# Patient Record
Sex: MALE | Race: OTHER | Employment: FULL TIME | URBAN - METROPOLITAN AREA
[De-identification: names, ages, dates, MRNs, and addresses within clinical notes are randomized per-mention and may not be internally consistent; named-entity substitution may affect disease eponyms.]

---

## 2017-01-03 ENCOUNTER — OFFICE VISIT (OUTPATIENT)
Dept: HEMATOLOGY/ONCOLOGY | Facility: HOSPITAL | Age: 62
End: 2017-01-03
Attending: INTERNAL MEDICINE
Payer: COMMERCIAL

## 2017-01-03 VITALS
RESPIRATION RATE: 16 BRPM | HEART RATE: 64 BPM | WEIGHT: 184 LBS | DIASTOLIC BLOOD PRESSURE: 80 MMHG | BODY MASS INDEX: 25.76 KG/M2 | SYSTOLIC BLOOD PRESSURE: 136 MMHG | HEIGHT: 71 IN | TEMPERATURE: 98 F

## 2017-01-03 DIAGNOSIS — C83.30 DLBCL (DIFFUSE LARGE B CELL LYMPHOMA) (HCC): Primary | ICD-10-CM

## 2017-01-03 DIAGNOSIS — Z45.2 ENCOUNTER FOR ADJUSTMENT OR MANAGEMENT OF VASCULAR ACCESS DEVICE: ICD-10-CM

## 2017-01-03 DIAGNOSIS — Z09 CHEMOTHERAPY FOLLOW-UP EXAMINATION: ICD-10-CM

## 2017-01-03 LAB
ALBUMIN SERPL BCP-MCNC: 3.9 G/DL (ref 3.5–4.8)
ALBUMIN/GLOB SERPL: 1.6 {RATIO} (ref 1–2)
ALP SERPL-CCNC: 57 U/L (ref 32–100)
ALT SERPL-CCNC: 26 U/L (ref 17–63)
ANION GAP SERPL CALC-SCNC: 7 MMOL/L (ref 0–18)
AST SERPL-CCNC: 24 U/L (ref 15–41)
BASOPHILS # BLD: 0 K/UL (ref 0–0.2)
BASOPHILS NFR BLD: 0 %
BILIRUB SERPL-MCNC: 0.4 MG/DL (ref 0.3–1.2)
BUN SERPL-MCNC: 13 MG/DL (ref 8–20)
BUN/CREAT SERPL: 21.3 (ref 10–20)
CALCIUM SERPL-MCNC: 8.8 MG/DL (ref 8.5–10.5)
CHLORIDE SERPL-SCNC: 104 MMOL/L (ref 95–110)
CO2 SERPL-SCNC: 27 MMOL/L (ref 22–32)
CREAT SERPL-MCNC: 0.61 MG/DL (ref 0.5–1.5)
EOSINOPHIL # BLD: 0 K/UL (ref 0–0.7)
EOSINOPHIL NFR BLD: 1 %
ERYTHROCYTE [DISTWIDTH] IN BLOOD BY AUTOMATED COUNT: 19 % (ref 11–15)
GLOBULIN PLAS-MCNC: 2.5 G/DL (ref 2.5–3.7)
GLUCOSE SERPL-MCNC: 113 MG/DL (ref 70–99)
HCT VFR BLD AUTO: 35.1 % (ref 41–52)
HGB BLD-MCNC: 12.1 G/DL (ref 13.5–17.5)
LYMPHOCYTES # BLD: 1.2 K/UL (ref 1–4)
LYMPHOCYTES NFR BLD: 26 %
MCH RBC QN AUTO: 33.8 PG (ref 27–32)
MCHC RBC AUTO-ENTMCNC: 34.5 G/DL (ref 32–37)
MCV RBC AUTO: 97.9 FL (ref 80–100)
MONOCYTES # BLD: 0.7 K/UL (ref 0–1)
MONOCYTES NFR BLD: 15 %
NEUTROPHILS # BLD AUTO: 2.6 K/UL (ref 1.8–7.7)
NEUTROPHILS NFR BLD: 58 %
OSMOLALITY UR CALC.SUM OF ELEC: 287 MOSM/KG (ref 275–295)
PLATELET # BLD AUTO: 199 K/UL (ref 140–400)
PMV BLD AUTO: 9 FL (ref 7.4–10.3)
POTASSIUM SERPL-SCNC: 3.5 MMOL/L (ref 3.3–5.1)
PROT SERPL-MCNC: 6.4 G/DL (ref 5.9–8.4)
RBC # BLD AUTO: 3.59 M/UL (ref 4.5–5.9)
SODIUM SERPL-SCNC: 138 MMOL/L (ref 136–144)
WBC # BLD AUTO: 4.5 K/UL (ref 4–11)

## 2017-01-03 PROCEDURE — 85025 COMPLETE CBC W/AUTO DIFF WBC: CPT

## 2017-01-03 PROCEDURE — 99212 OFFICE O/P EST SF 10 MIN: CPT | Performed by: INTERNAL MEDICINE

## 2017-01-03 PROCEDURE — 80053 COMPREHEN METABOLIC PANEL: CPT

## 2017-01-03 PROCEDURE — 36591 DRAW BLOOD OFF VENOUS DEVICE: CPT

## 2017-01-03 PROCEDURE — 99215 OFFICE O/P EST HI 40 MIN: CPT | Performed by: INTERNAL MEDICINE

## 2017-01-03 RX ORDER — HEPARIN SODIUM (PORCINE) LOCK FLUSH IV SOLN 100 UNIT/ML 100 UNIT/ML
SOLUTION INTRAVENOUS
Status: COMPLETED
Start: 2017-01-03 | End: 2017-01-03

## 2017-01-03 RX ORDER — SODIUM CHLORIDE 0.9 % (FLUSH) 0.9 %
SYRINGE (ML) INJECTION
Status: DISCONTINUED
Start: 2017-01-03 | End: 2017-01-03

## 2017-01-03 RX ORDER — HEPARIN SODIUM (PORCINE) LOCK FLUSH IV SOLN 100 UNIT/ML 100 UNIT/ML
5 SOLUTION INTRAVENOUS ONCE
Status: COMPLETED | OUTPATIENT
Start: 2017-01-03 | End: 2017-01-03

## 2017-01-03 RX ORDER — 0.9 % SODIUM CHLORIDE 0.9 %
VIAL (ML) INJECTION
Status: DISCONTINUED
Start: 2017-01-03 | End: 2017-01-03

## 2017-01-03 RX ADMIN — HEPARIN SODIUM (PORCINE) LOCK FLUSH IV SOLN 100 UNIT/ML 500 UNITS: 100 SOLUTION INTRAVENOUS at 14:40:00

## 2017-01-03 NOTE — PROGRESS NOTES
MIK       Reina Azul is a 63year old male here for follow up of Dlbcl (diffuse large b cell lymphoma) (hcc)  (primary encounter diagnosis)  Chemotherapy follow-up examination       Pt here today for follow up s/p Cycle 3 RCHOP, tolerated well. easily. Psychiatric/Behavioral: Positive for sleep disturbance (better with sleeping pill ). The patient is not nervous/anxious.          Feeling less depressed            Current Outpatient Prescriptions:  Losartan Potassium-HCTZ 50-12.5 MG Oral Tab Take History   Problem Relation Age of Onset   • Heart Disease Mother    • Heart Disease Father    • Breast Cancer Sister 36         PHYSICAL EXAM:    /80 mmHg  Pulse 64  Temp(Src) 98.2 °F (36.8 °C) (Oral)  Resp 16  Ht 1.803 m (5' 11\")  Wt 83.462 kg (184 adenopathy present. Right: No inguinal and no supraclavicular adenopathy present. Left: No inguinal and no supraclavicular adenopathy present. Neurological: He is alert and oriented to person, place, and time. He has normal reflexes.    Skin: Calculated Osmolality 287 275-295 mOsm/kg   GFR, Non-African American >60 >=60   GFR, -American >60 >=60   -CBC W/ DIFFERENTIAL   Collection Time: 01/03/17  2:33 PM   Result Value Ref Range   WBC 4.5 4.0-11.0 K/UL   RBC 3.59 (L) 4.50-5.90 M/UL   H aneurysm or dissection. LUNGS/PLEURA:          Multiple unchanged lung nodules as follows:  A 4 mm and a 5 mm right apical nodule image 16 series 4. 3 mm anterior right upper lobe nodule image 32 series 4, 4 mm polygonal nodule minor fissure image 48 serie amount of stool in colon. No visible mass, obstruction, or bowel wall thickening.     ABDOMINAL WALL:      Small fat containing left inguinal hernia. .     URINARY BLADDER:     Nearly collapsed. ASCITES:                                 None.   PELVIC ORGANS

## 2017-01-03 NOTE — PROGRESS NOTES
Pt to infusion for pre-chemo labs. Port accessed using sterile technique, line flushing well with positive blood return noted. Labs were collected and sent. Port flushed per protocol and de-cannulated; site covered with 2x2 and steri strip.  Pt tolerated we

## 2017-01-04 ENCOUNTER — OFFICE VISIT (OUTPATIENT)
Dept: HEMATOLOGY/ONCOLOGY | Facility: HOSPITAL | Age: 62
End: 2017-01-04
Attending: INTERNAL MEDICINE
Payer: COMMERCIAL

## 2017-01-04 VITALS
TEMPERATURE: 98 F | DIASTOLIC BLOOD PRESSURE: 59 MMHG | RESPIRATION RATE: 16 BRPM | HEART RATE: 66 BPM | SYSTOLIC BLOOD PRESSURE: 108 MMHG

## 2017-01-04 DIAGNOSIS — C83.30 DLBCL (DIFFUSE LARGE B CELL LYMPHOMA) (HCC): Primary | ICD-10-CM

## 2017-01-04 DIAGNOSIS — Z45.2 ENCOUNTER FOR ADJUSTMENT OR MANAGEMENT OF VASCULAR ACCESS DEVICE: ICD-10-CM

## 2017-01-04 PROCEDURE — 96415 CHEMO IV INFUSION ADDL HR: CPT

## 2017-01-04 PROCEDURE — 96375 TX/PRO/DX INJ NEW DRUG ADDON: CPT

## 2017-01-04 PROCEDURE — 99211 OFF/OP EST MAY X REQ PHY/QHP: CPT

## 2017-01-04 PROCEDURE — 96411 CHEMO IV PUSH ADDL DRUG: CPT

## 2017-01-04 PROCEDURE — 96417 CHEMO IV INFUS EACH ADDL SEQ: CPT

## 2017-01-04 PROCEDURE — 96367 TX/PROPH/DG ADDL SEQ IV INF: CPT

## 2017-01-04 PROCEDURE — 96413 CHEMO IV INFUSION 1 HR: CPT

## 2017-01-04 RX ORDER — DIPHENHYDRAMINE HCL 25 MG
CAPSULE ORAL
Status: COMPLETED
Start: 2017-01-04 | End: 2017-01-04

## 2017-01-04 RX ORDER — DIPHENHYDRAMINE HCL 25 MG
25 CAPSULE ORAL ONCE
Status: COMPLETED | OUTPATIENT
Start: 2017-01-04 | End: 2017-01-04

## 2017-01-04 RX ORDER — HEPARIN SODIUM (PORCINE) LOCK FLUSH IV SOLN 100 UNIT/ML 100 UNIT/ML
SOLUTION INTRAVENOUS
Status: COMPLETED
Start: 2017-01-04 | End: 2017-01-04

## 2017-01-04 RX ORDER — ZOLPIDEM TARTRATE 10 MG/1
10 TABLET ORAL NIGHTLY PRN
Qty: 30 TABLET | Refills: 1 | Status: SHIPPED
Start: 2017-01-04 | End: 2017-07-17 | Stop reason: ALTCHOICE

## 2017-01-04 RX ORDER — 0.9 % SODIUM CHLORIDE 0.9 %
VIAL (ML) INJECTION
Status: DISCONTINUED
Start: 2017-01-04 | End: 2017-01-04

## 2017-01-04 RX ORDER — HEPARIN SODIUM (PORCINE) LOCK FLUSH IV SOLN 100 UNIT/ML 100 UNIT/ML
5 SOLUTION INTRAVENOUS ONCE
Status: COMPLETED | OUTPATIENT
Start: 2017-01-04 | End: 2017-01-04

## 2017-01-04 RX ORDER — SODIUM CHLORIDE 9 MG/ML
INJECTION, SOLUTION INTRAVENOUS
Status: DISCONTINUED
Start: 2017-01-04 | End: 2017-01-04

## 2017-01-04 RX ORDER — ACETAMINOPHEN 325 MG/1
650 TABLET ORAL ONCE
Status: COMPLETED | OUTPATIENT
Start: 2017-01-04 | End: 2017-01-04

## 2017-01-04 RX ORDER — ACETAMINOPHEN 325 MG/1
TABLET ORAL
Status: COMPLETED
Start: 2017-01-04 | End: 2017-01-04

## 2017-01-04 RX ADMIN — HEPARIN SODIUM (PORCINE) LOCK FLUSH IV SOLN 100 UNIT/ML 500 UNITS: 100 SOLUTION INTRAVENOUS at 12:40:00

## 2017-01-04 RX ADMIN — ACETAMINOPHEN 650 MG: 325 TABLET ORAL at 10:30:00

## 2017-01-04 RX ADMIN — DIPHENHYDRAMINE HCL 25 MG: 25 MG CAPSULE ORAL at 10:30:00

## 2017-01-04 NOTE — PROGRESS NOTES
Pt presents to infusion today for C4D1 RCHOP. Pt appears well and offers no complaints of nausea/vomiting/constipation/diarrhea. Pt denies any fevers or flu like symptoms. Pt eating and drinking well.    Port accessed using sterile technique, blood retur exceed 4 in one day.  Notify MD immediately if temp >100.4. Mucositis discussed.  Pt encouraged to use biotene after every meal and after brushing teeth to keep mouth as clean as possible.  Hemorrhagic cystitis also discussed and the pt was encouraged to k

## 2017-01-04 NOTE — PATIENT INSTRUCTIONS
Safe Handling of Chemotherapy  While you or your family member is receiving chemotherapy, whether in the clinic or at home, the following precautions must be taken to lessen any exposure to the medication. Handling Body Waste:   1.  Caregivers must wear treatment, so you may not know if you are at a time in your cycle when you could become pregnant or if you are actually pregnant.

## 2017-01-05 ENCOUNTER — APPOINTMENT (OUTPATIENT)
Dept: HEMATOLOGY/ONCOLOGY | Facility: HOSPITAL | Age: 62
End: 2017-01-05
Attending: INTERNAL MEDICINE
Payer: COMMERCIAL

## 2017-01-24 ENCOUNTER — NURSE ONLY (OUTPATIENT)
Dept: HEMATOLOGY/ONCOLOGY | Facility: HOSPITAL | Age: 62
End: 2017-01-24
Attending: INTERNAL MEDICINE
Payer: COMMERCIAL

## 2017-01-24 ENCOUNTER — TELEPHONE (OUTPATIENT)
Dept: HEMATOLOGY/ONCOLOGY | Facility: HOSPITAL | Age: 62
End: 2017-01-24

## 2017-01-24 VITALS
HEIGHT: 71 IN | RESPIRATION RATE: 16 BRPM | BODY MASS INDEX: 25.34 KG/M2 | HEART RATE: 60 BPM | SYSTOLIC BLOOD PRESSURE: 124 MMHG | WEIGHT: 181 LBS | TEMPERATURE: 98 F | DIASTOLIC BLOOD PRESSURE: 77 MMHG

## 2017-01-24 DIAGNOSIS — Z09 CHEMOTHERAPY FOLLOW-UP EXAMINATION: ICD-10-CM

## 2017-01-24 DIAGNOSIS — Z45.2 ENCOUNTER FOR ADJUSTMENT OR MANAGEMENT OF VASCULAR ACCESS DEVICE: ICD-10-CM

## 2017-01-24 DIAGNOSIS — C83.30 DLBCL (DIFFUSE LARGE B CELL LYMPHOMA) (HCC): Primary | ICD-10-CM

## 2017-01-24 LAB
ALBUMIN SERPL BCP-MCNC: 3.9 G/DL (ref 3.5–4.8)
ALBUMIN/GLOB SERPL: 1.6 {RATIO} (ref 1–2)
ALP SERPL-CCNC: 56 U/L (ref 32–100)
ALT SERPL-CCNC: 22 U/L (ref 17–63)
ANION GAP SERPL CALC-SCNC: 7 MMOL/L (ref 0–18)
AST SERPL-CCNC: 21 U/L (ref 15–41)
BASOPHILS # BLD: 0 K/UL (ref 0–0.2)
BASOPHILS NFR BLD: 0 %
BILIRUB SERPL-MCNC: 0.5 MG/DL (ref 0.3–1.2)
BUN SERPL-MCNC: 11 MG/DL (ref 8–20)
BUN/CREAT SERPL: 23.4 (ref 10–20)
CALCIUM SERPL-MCNC: 9.1 MG/DL (ref 8.5–10.5)
CHLORIDE SERPL-SCNC: 105 MMOL/L (ref 95–110)
CO2 SERPL-SCNC: 28 MMOL/L (ref 22–32)
CREAT SERPL-MCNC: 0.47 MG/DL (ref 0.5–1.5)
EOSINOPHIL # BLD: 0 K/UL (ref 0–0.7)
EOSINOPHIL NFR BLD: 1 %
ERYTHROCYTE [DISTWIDTH] IN BLOOD BY AUTOMATED COUNT: 18.2 % (ref 11–15)
GLOBULIN PLAS-MCNC: 2.5 G/DL (ref 2.5–3.7)
GLUCOSE SERPL-MCNC: 127 MG/DL (ref 70–99)
HCT VFR BLD AUTO: 34.2 % (ref 41–52)
HGB BLD-MCNC: 11.9 G/DL (ref 13.5–17.5)
LYMPHOCYTES # BLD: 1 K/UL (ref 1–4)
LYMPHOCYTES NFR BLD: 25 %
MCH RBC QN AUTO: 34.4 PG (ref 27–32)
MCHC RBC AUTO-ENTMCNC: 34.6 G/DL (ref 32–37)
MCV RBC AUTO: 99.2 FL (ref 80–100)
MONOCYTES # BLD: 0.5 K/UL (ref 0–1)
MONOCYTES NFR BLD: 13 %
NEUTROPHILS # BLD AUTO: 2.4 K/UL (ref 1.8–7.7)
NEUTROPHILS NFR BLD: 62 %
OSMOLALITY UR CALC.SUM OF ELEC: 291 MOSM/KG (ref 275–295)
PLATELET # BLD AUTO: 207 K/UL (ref 140–400)
PMV BLD AUTO: 8.9 FL (ref 7.4–10.3)
POTASSIUM SERPL-SCNC: 3.5 MMOL/L (ref 3.3–5.1)
PROT SERPL-MCNC: 6.4 G/DL (ref 5.9–8.4)
RBC # BLD AUTO: 3.45 M/UL (ref 4.5–5.9)
SODIUM SERPL-SCNC: 140 MMOL/L (ref 136–144)
WBC # BLD AUTO: 3.9 K/UL (ref 4–11)

## 2017-01-24 PROCEDURE — 36591 DRAW BLOOD OFF VENOUS DEVICE: CPT

## 2017-01-24 PROCEDURE — 99215 OFFICE O/P EST HI 40 MIN: CPT | Performed by: NURSE PRACTITIONER

## 2017-01-24 PROCEDURE — 80053 COMPREHEN METABOLIC PANEL: CPT

## 2017-01-24 PROCEDURE — 85025 COMPLETE CBC W/AUTO DIFF WBC: CPT

## 2017-01-24 PROCEDURE — 99212 OFFICE O/P EST SF 10 MIN: CPT | Performed by: NURSE PRACTITIONER

## 2017-01-24 RX ORDER — HEPARIN SODIUM (PORCINE) LOCK FLUSH IV SOLN 100 UNIT/ML 100 UNIT/ML
5 SOLUTION INTRAVENOUS ONCE
Status: COMPLETED | OUTPATIENT
Start: 2017-01-24 | End: 2017-01-24

## 2017-01-24 RX ORDER — PEGFILGRASTIM 6 MG/.6ML
INJECTION SUBCUTANEOUS
Qty: 0.6 ML | Refills: 1 | Status: SHIPPED | OUTPATIENT
Start: 2017-01-24 | End: 2017-07-17 | Stop reason: ALTCHOICE

## 2017-01-24 RX ORDER — HEPARIN SODIUM (PORCINE) LOCK FLUSH IV SOLN 100 UNIT/ML 100 UNIT/ML
SOLUTION INTRAVENOUS
Status: DISCONTINUED
Start: 2017-01-24 | End: 2017-01-24

## 2017-01-24 RX ORDER — 0.9 % SODIUM CHLORIDE 0.9 %
VIAL (ML) INJECTION
Status: DISCONTINUED
Start: 2017-01-24 | End: 2017-01-24

## 2017-01-24 RX ORDER — SODIUM CHLORIDE 0.9 % (FLUSH) 0.9 %
SYRINGE (ML) INJECTION
Status: DISCONTINUED
Start: 2017-01-24 | End: 2017-01-24

## 2017-01-24 RX ADMIN — HEPARIN SODIUM (PORCINE) LOCK FLUSH IV SOLN 100 UNIT/ML 500 UNITS: 100 SOLUTION INTRAVENOUS at 13:07:00

## 2017-01-24 NOTE — PROGRESS NOTES
Pt here for pre-chemo labs, appt with René Luna NP. Tomorrow = chemo infusion appt and pt is aware. He states that for the first 5 or so days post-chemo, he feels tired, blah. He states that he is able to eat/drink without any n/v, GI issues.   He denie

## 2017-01-24 NOTE — PROGRESS NOTES
MIK Augustin is a 63year old male here for follow up of Dlbcl (diffuse large b cell lymphoma) (hcc)  (primary encounter diagnosis)  Chemotherapy follow-up examination       Pt here today for follow up s/p Cycle 4 RCHOP, tolerated well.  Judie Vazquez not bruise/bleed easily. Psychiatric/Behavioral: Positive for sleep disturbance (better with sleeping pill ). The patient is not nervous/anxious.          Feeling less depressed            Current Outpatient Prescriptions:  NEULASTA 6 MG/0.6ML Subcutaneou Concern Yes    Comment: Coffee     Social History Narrative     Family History   Problem Relation Age of Onset   • Heart Disease Mother    • Heart Disease Father    • Breast Cancer Sister 36         PHYSICAL EXAM:    /77 mmHg  Pulse 60  Temp(Src) 98. adenopathy present. Left axillary: No pectoral and no lateral adenopathy present. Right: No inguinal and no supraclavicular adenopathy present. Left: No inguinal and no supraclavicular adenopathy present.    Neurological: He is alert and 10.0-20.0   Calculated Osmolality 291 275-295 mOsm/kg   GFR, Non-African American >60 >=60   GFR, -American >60 >=60   -CBC W/ DIFFERENTIAL   Collection Time: 01/24/17 12:56 PM   Result Value Ref Range   WBC 3.9 (L) 4.0-11.0 K/UL   RBC 3.45 (L) 4.50

## 2017-01-24 NOTE — TELEPHONE ENCOUNTER
[facility] pharmacy called to verify they received script for Neulasta from [facility]. Per Freida Camilo is ready to be picked up per pt.

## 2017-01-25 ENCOUNTER — OFFICE VISIT (OUTPATIENT)
Dept: HEMATOLOGY/ONCOLOGY | Facility: HOSPITAL | Age: 62
End: 2017-01-25
Attending: INTERNAL MEDICINE
Payer: COMMERCIAL

## 2017-01-25 VITALS
TEMPERATURE: 98 F | SYSTOLIC BLOOD PRESSURE: 112 MMHG | DIASTOLIC BLOOD PRESSURE: 67 MMHG | RESPIRATION RATE: 16 BRPM | HEART RATE: 73 BPM

## 2017-01-25 DIAGNOSIS — C83.30 DLBCL (DIFFUSE LARGE B CELL LYMPHOMA) (HCC): Primary | ICD-10-CM

## 2017-01-25 DIAGNOSIS — Z45.2 ENCOUNTER FOR ADJUSTMENT OR MANAGEMENT OF VASCULAR ACCESS DEVICE: ICD-10-CM

## 2017-01-25 PROCEDURE — 96413 CHEMO IV INFUSION 1 HR: CPT

## 2017-01-25 PROCEDURE — 96367 TX/PROPH/DG ADDL SEQ IV INF: CPT

## 2017-01-25 PROCEDURE — 96411 CHEMO IV PUSH ADDL DRUG: CPT

## 2017-01-25 PROCEDURE — 96417 CHEMO IV INFUS EACH ADDL SEQ: CPT

## 2017-01-25 PROCEDURE — 96375 TX/PRO/DX INJ NEW DRUG ADDON: CPT

## 2017-01-25 PROCEDURE — 96415 CHEMO IV INFUSION ADDL HR: CPT

## 2017-01-25 RX ORDER — SODIUM CHLORIDE 9 MG/ML
INJECTION, SOLUTION INTRAVENOUS
Status: DISCONTINUED
Start: 2017-01-25 | End: 2017-01-25

## 2017-01-25 RX ORDER — SODIUM CHLORIDE 0.9 % (FLUSH) 0.9 %
SYRINGE (ML) INJECTION
Status: DISCONTINUED
Start: 2017-01-25 | End: 2017-01-25

## 2017-01-25 RX ORDER — DIPHENHYDRAMINE HCL 25 MG
25 CAPSULE ORAL ONCE
Status: COMPLETED | OUTPATIENT
Start: 2017-01-25 | End: 2017-01-25

## 2017-01-25 RX ORDER — ACETAMINOPHEN 325 MG/1
650 TABLET ORAL ONCE
Status: COMPLETED | OUTPATIENT
Start: 2017-01-25 | End: 2017-01-25

## 2017-01-25 RX ORDER — HEPARIN SODIUM (PORCINE) LOCK FLUSH IV SOLN 100 UNIT/ML 100 UNIT/ML
SOLUTION INTRAVENOUS
Status: COMPLETED
Start: 2017-01-25 | End: 2017-01-25

## 2017-01-25 RX ORDER — DIPHENHYDRAMINE HCL 25 MG
CAPSULE ORAL
Status: COMPLETED
Start: 2017-01-25 | End: 2017-01-25

## 2017-01-25 RX ORDER — ACETAMINOPHEN 325 MG/1
TABLET ORAL
Status: COMPLETED
Start: 2017-01-25 | End: 2017-01-25

## 2017-01-25 RX ORDER — HEPARIN SODIUM (PORCINE) LOCK FLUSH IV SOLN 100 UNIT/ML 100 UNIT/ML
5 SOLUTION INTRAVENOUS ONCE
Status: COMPLETED | OUTPATIENT
Start: 2017-01-25 | End: 2017-01-25

## 2017-01-25 RX ORDER — 0.9 % SODIUM CHLORIDE 0.9 %
VIAL (ML) INJECTION
Status: DISCONTINUED
Start: 2017-01-25 | End: 2017-01-25

## 2017-01-25 RX ADMIN — HEPARIN SODIUM (PORCINE) LOCK FLUSH IV SOLN 100 UNIT/ML 500 UNITS: 100 SOLUTION INTRAVENOUS at 13:00:00

## 2017-01-25 RX ADMIN — ACETAMINOPHEN 650 MG: 325 TABLET ORAL at 10:06:00

## 2017-01-25 RX ADMIN — DIPHENHYDRAMINE HCL 25 MG: 25 MG CAPSULE ORAL at 10:06:00

## 2017-01-25 NOTE — PROGRESS NOTES
Pt presents to infusion today for C5D1 RCHOP. Pt appears well and offers no complaints of nausea/vomiting/constipation/diarrhea. Pt denies any fevers or flu like symptoms. Pt eating and drinking well, pt is down 3 lbs since last treatment.  Pt had diffic plan of care  verbalize how to care for self    Progress towards outcome:  making progress

## 2017-01-27 ENCOUNTER — TELEPHONE (OUTPATIENT)
Dept: HEMATOLOGY/ONCOLOGY | Facility: HOSPITAL | Age: 62
End: 2017-01-27

## 2017-02-02 ENCOUNTER — OFFICE VISIT (OUTPATIENT)
Dept: HEMATOLOGY/ONCOLOGY | Facility: HOSPITAL | Age: 62
End: 2017-02-02
Attending: NURSE PRACTITIONER
Payer: COMMERCIAL

## 2017-02-02 ENCOUNTER — TELEPHONE (OUTPATIENT)
Dept: HEMATOLOGY/ONCOLOGY | Facility: HOSPITAL | Age: 62
End: 2017-02-02

## 2017-02-02 VITALS
WEIGHT: 180 LBS | HEART RATE: 76 BPM | BODY MASS INDEX: 25.2 KG/M2 | HEIGHT: 71 IN | SYSTOLIC BLOOD PRESSURE: 139 MMHG | DIASTOLIC BLOOD PRESSURE: 84 MMHG | TEMPERATURE: 98 F | RESPIRATION RATE: 16 BRPM

## 2017-02-02 DIAGNOSIS — C83.30 DLBCL (DIFFUSE LARGE B CELL LYMPHOMA) (HCC): Primary | ICD-10-CM

## 2017-02-02 DIAGNOSIS — Z09 CHEMOTHERAPY FOLLOW-UP EXAMINATION: ICD-10-CM

## 2017-02-02 LAB
ALBUMIN SERPL BCP-MCNC: 4.3 G/DL (ref 3.5–4.8)
ALBUMIN/GLOB SERPL: 1.9 {RATIO} (ref 1–2)
ALP SERPL-CCNC: 77 U/L (ref 32–100)
ALT SERPL-CCNC: 22 U/L (ref 17–63)
ANION GAP SERPL CALC-SCNC: 9 MMOL/L (ref 0–18)
AST SERPL-CCNC: 19 U/L (ref 15–41)
BASOPHILS # BLD: 0.1 K/UL (ref 0–0.2)
BASOPHILS NFR BLD: 2 %
BILIRUB SERPL-MCNC: 0.6 MG/DL (ref 0.3–1.2)
BUN SERPL-MCNC: 11 MG/DL (ref 8–20)
BUN/CREAT SERPL: 18.3 (ref 10–20)
CALCIUM SERPL-MCNC: 9.3 MG/DL (ref 8.5–10.5)
CHLORIDE SERPL-SCNC: 101 MMOL/L (ref 95–110)
CO2 SERPL-SCNC: 28 MMOL/L (ref 22–32)
CREAT SERPL-MCNC: 0.6 MG/DL (ref 0.5–1.5)
EOSINOPHIL # BLD: 0 K/UL (ref 0–0.7)
EOSINOPHIL NFR BLD: 0 %
ERYTHROCYTE [DISTWIDTH] IN BLOOD BY AUTOMATED COUNT: 17.2 % (ref 11–15)
GLOBULIN PLAS-MCNC: 2.3 G/DL (ref 2.5–3.7)
GLUCOSE SERPL-MCNC: 105 MG/DL (ref 70–99)
HCT VFR BLD AUTO: 35.3 % (ref 41–52)
HGB BLD-MCNC: 12.1 G/DL (ref 13.5–17.5)
LYMPHOCYTES # BLD: 1 K/UL (ref 1–4)
LYMPHOCYTES NFR BLD: 31 %
MCH RBC QN AUTO: 34.1 PG (ref 27–32)
MCHC RBC AUTO-ENTMCNC: 34.2 G/DL (ref 32–37)
MCV RBC AUTO: 99.6 FL (ref 80–100)
METAMYELOCYTES # BLD MANUAL: 0.06 K/UL
METAMYELOCYTES # BLD MANUAL: 0.12 K/UL
METAMYELOCYTES NFR BLD: 4 %
MONOCYTES # BLD: 0.3 K/UL (ref 0–1)
MONOCYTES NFR BLD: 10 %
MYELOCYTES NFR BLD: 2 %
NEUTROPHILS # BLD AUTO: 1.6 K/UL (ref 1.8–7.7)
NEUTROPHILS NFR BLD: 39 %
NEUTS BAND NFR BLD: 12 %
OSMOLALITY UR CALC.SUM OF ELEC: 286 MOSM/KG (ref 275–295)
PLATELET # BLD AUTO: 114 K/UL (ref 140–400)
PMV BLD AUTO: 9.5 FL (ref 7.4–10.3)
POTASSIUM SERPL-SCNC: 3.7 MMOL/L (ref 3.3–5.1)
PROT SERPL-MCNC: 6.6 G/DL (ref 5.9–8.4)
RBC # BLD AUTO: 3.55 M/UL (ref 4.5–5.9)
SODIUM SERPL-SCNC: 138 MMOL/L (ref 136–144)
WBC # BLD AUTO: 3.1 K/UL (ref 4–11)

## 2017-02-02 PROCEDURE — 99213 OFFICE O/P EST LOW 20 MIN: CPT | Performed by: NURSE PRACTITIONER

## 2017-02-02 PROCEDURE — 99212 OFFICE O/P EST SF 10 MIN: CPT | Performed by: NURSE PRACTITIONER

## 2017-02-02 NOTE — PROGRESS NOTES
HPI       Quique Enriquez is a [address] old male here for follow up of Dlbcl (diffuse large b cell lymphoma) (hcc)  (primary encounter diagnosis)  Chemotherapy follow-up examination       Pt here today for acute visit s/p Cycle 5D9 RCHOP.  Came to front arthralgias, gait problem and neck pain. Skin: Negative. Negative for color change and rash. Alopecia    Allergic/Immunologic: Negative for environmental allergies.    Neurological: Positive for light-headedness and numbness (mild numbness to fing Education: N/A  Number of Children: 4     Occupational History  works for shipping       Social History Main Topics   Smoking status: Former Smoker  1.00 Packs/Day  For 35.00 Years     Smokeless tobacco: Never Used    Comment: quit 3 yrs ago    Alcohol Use submandibular, no preauricular, no posterior auricular and no occipital adenopathy present. He has no cervical adenopathy. Right cervical: No superficial cervical, no deep cervical and no posterior cervical adenopathy present.        Left cervica Referrals:  None   No orders of the defined types were placed in this encounter.

## 2017-02-02 NOTE — TELEPHONE ENCOUNTER
Toxicities: C5D1 RCHOP on 1/25/2017      Fatigue/Mucositis/Dysphagia/ Nausea/Dehydration    Fever:  (Denies c/s/f  Voiding clear/yellow urine.  No hematuria.)  Fatigue: Grade 1  Mucositis Oral: Grade 1 (Sore throat & pain with swallowing present since 1/28/

## 2017-02-14 ENCOUNTER — NURSE ONLY (OUTPATIENT)
Dept: HEMATOLOGY/ONCOLOGY | Facility: HOSPITAL | Age: 62
End: 2017-02-14
Attending: INTERNAL MEDICINE
Payer: COMMERCIAL

## 2017-02-14 VITALS
WEIGHT: 184 LBS | BODY MASS INDEX: 25.76 KG/M2 | RESPIRATION RATE: 16 BRPM | HEART RATE: 67 BPM | SYSTOLIC BLOOD PRESSURE: 131 MMHG | TEMPERATURE: 98 F | HEIGHT: 71 IN | DIASTOLIC BLOOD PRESSURE: 90 MMHG

## 2017-02-14 DIAGNOSIS — Z45.2 ENCOUNTER FOR ADJUSTMENT OR MANAGEMENT OF VASCULAR ACCESS DEVICE: ICD-10-CM

## 2017-02-14 DIAGNOSIS — C83.30 DLBCL (DIFFUSE LARGE B CELL LYMPHOMA) (HCC): Primary | ICD-10-CM

## 2017-02-14 DIAGNOSIS — Z09 CHEMOTHERAPY FOLLOW-UP EXAMINATION: ICD-10-CM

## 2017-02-14 LAB
ALBUMIN SERPL BCP-MCNC: 4.1 G/DL (ref 3.5–4.8)
ALBUMIN/GLOB SERPL: 1.6 {RATIO} (ref 1–2)
ALP SERPL-CCNC: 54 U/L (ref 32–100)
ALT SERPL-CCNC: 27 U/L (ref 17–63)
ANION GAP SERPL CALC-SCNC: 9 MMOL/L (ref 0–18)
AST SERPL-CCNC: 23 U/L (ref 15–41)
BASOPHILS # BLD: 0 K/UL (ref 0–0.2)
BASOPHILS NFR BLD: 1 %
BILIRUB SERPL-MCNC: 0.5 MG/DL (ref 0.3–1.2)
BUN SERPL-MCNC: 9 MG/DL (ref 8–20)
BUN/CREAT SERPL: 18.4 (ref 10–20)
CALCIUM SERPL-MCNC: 8.9 MG/DL (ref 8.5–10.5)
CHLORIDE SERPL-SCNC: 101 MMOL/L (ref 95–110)
CO2 SERPL-SCNC: 28 MMOL/L (ref 22–32)
CREAT SERPL-MCNC: 0.49 MG/DL (ref 0.5–1.5)
EOSINOPHIL # BLD: 0 K/UL (ref 0–0.7)
EOSINOPHIL NFR BLD: 0 %
ERYTHROCYTE [DISTWIDTH] IN BLOOD BY AUTOMATED COUNT: 16.1 % (ref 11–15)
GLOBULIN PLAS-MCNC: 2.5 G/DL (ref 2.5–3.7)
GLUCOSE SERPL-MCNC: 125 MG/DL (ref 70–99)
HCT VFR BLD AUTO: 34.7 % (ref 41–52)
HGB BLD-MCNC: 12 G/DL (ref 13.5–17.5)
LYMPHOCYTES # BLD: 1 K/UL (ref 1–4)
LYMPHOCYTES NFR BLD: 22 %
MCH RBC QN AUTO: 34.4 PG (ref 27–32)
MCHC RBC AUTO-ENTMCNC: 34.7 G/DL (ref 32–37)
MCV RBC AUTO: 99.1 FL (ref 80–100)
MONOCYTES # BLD: 0.6 K/UL (ref 0–1)
MONOCYTES NFR BLD: 14 %
NEUTROPHILS # BLD AUTO: 2.8 K/UL (ref 1.8–7.7)
NEUTROPHILS NFR BLD: 63 %
OSMOLALITY UR CALC.SUM OF ELEC: 286 MOSM/KG (ref 275–295)
PLATELET # BLD AUTO: 219 K/UL (ref 140–400)
PMV BLD AUTO: 8.6 FL (ref 7.4–10.3)
POTASSIUM SERPL-SCNC: 3.3 MMOL/L (ref 3.3–5.1)
PROT SERPL-MCNC: 6.6 G/DL (ref 5.9–8.4)
RBC # BLD AUTO: 3.51 M/UL (ref 4.5–5.9)
SODIUM SERPL-SCNC: 138 MMOL/L (ref 136–144)
WBC # BLD AUTO: 4.4 K/UL (ref 4–11)

## 2017-02-14 PROCEDURE — 85025 COMPLETE CBC W/AUTO DIFF WBC: CPT

## 2017-02-14 PROCEDURE — 99215 OFFICE O/P EST HI 40 MIN: CPT | Performed by: NURSE PRACTITIONER

## 2017-02-14 PROCEDURE — 99212 OFFICE O/P EST SF 10 MIN: CPT | Performed by: NURSE PRACTITIONER

## 2017-02-14 PROCEDURE — 80053 COMPREHEN METABOLIC PANEL: CPT

## 2017-02-14 PROCEDURE — 36591 DRAW BLOOD OFF VENOUS DEVICE: CPT

## 2017-02-14 RX ORDER — PREDNISONE 20 MG/1
TABLET ORAL
Qty: 25 TABLET | Refills: 0 | Status: ON HOLD | OUTPATIENT
Start: 2017-02-14 | End: 2017-06-07

## 2017-02-14 RX ORDER — 0.9 % SODIUM CHLORIDE 0.9 %
10 VIAL (ML) INJECTION ONCE
Status: COMPLETED | OUTPATIENT
Start: 2017-02-14 | End: 2017-02-14

## 2017-02-14 RX ORDER — 0.9 % SODIUM CHLORIDE 0.9 %
VIAL (ML) INJECTION
Status: DISCONTINUED
Start: 2017-02-14 | End: 2017-02-14

## 2017-02-14 RX ORDER — HEPARIN SODIUM (PORCINE) LOCK FLUSH IV SOLN 100 UNIT/ML 100 UNIT/ML
5 SOLUTION INTRAVENOUS ONCE
Status: COMPLETED | OUTPATIENT
Start: 2017-02-14 | End: 2017-02-14

## 2017-02-14 RX ORDER — HEPARIN SODIUM (PORCINE) LOCK FLUSH IV SOLN 100 UNIT/ML 100 UNIT/ML
SOLUTION INTRAVENOUS
Status: DISCONTINUED
Start: 2017-02-14 | End: 2017-02-14

## 2017-02-14 RX ADMIN — HEPARIN SODIUM (PORCINE) LOCK FLUSH IV SOLN 100 UNIT/ML 500 UNITS: 100 SOLUTION INTRAVENOUS at 15:12:00

## 2017-02-14 RX ADMIN — 0.9 % SODIUM CHLORIDE 10 ML: 0.9 % VIAL (ML) INJECTION at 15:12:00

## 2017-02-14 NOTE — PROGRESS NOTES
Patient here for Pre-Chemotherapy lab draw. Right chest port accessed per sterile technique, good blood return noted. Cbc,cmp obtained and sent to lab. Port flushed per protocol and calvo needle removed.   Gauze and paper tape to site  Patient discharged

## 2017-02-14 NOTE — PROGRESS NOTES
HPI       Lawyer Cordova is a 63year old male here for follow up of Dlbcl (diffuse large b cell lymphoma) (hcc)  (primary encounter diagnosis)  Chemotherapy follow-up examination       Pt here today for follow up s/p Cycle 5 RCHOP.  C/o took longer t Hematological: Negative for adenopathy. Does not bruise/bleed easily. Psychiatric/Behavioral: Positive for sleep disturbance (better with sleeping pill ). The patient is not nervous/anxious.          Feeling less depressed            Current Outpatient Not on file  Other Topics Concern    Caffeine Concern Yes    Comment: Coffee     Social History Narrative     Family History   Problem Relation Age of Onset   • Heart Disease Mother    • Heart Disease Father    • Breast Cancer Sister 36         PHYSICAL axillary: No pectoral and no lateral adenopathy present. Left axillary: No pectoral and no lateral adenopathy present. Right: No inguinal and no supraclavicular adenopathy present.         Left: No inguinal and no supraclavicular adenopathy pre Globulin 2.5 2.5-3.7 g/dL   A/G Ratio 1.6 1.0-2.0   Anion Gap 9 0-18   BUN/CREA Ratio 18.4 10.0-20.0   Calculated Osmolality 286 275-295 mOsm/kg   GFR, Non-African American >60 >=60   GFR, -American >60 >=60   -CBC W/ DIFFERENTIAL   Collection Hector Shukla

## 2017-02-15 ENCOUNTER — OFFICE VISIT (OUTPATIENT)
Dept: HEMATOLOGY/ONCOLOGY | Facility: HOSPITAL | Age: 62
End: 2017-02-15
Attending: INTERNAL MEDICINE
Payer: COMMERCIAL

## 2017-02-15 VITALS
TEMPERATURE: 98 F | DIASTOLIC BLOOD PRESSURE: 61 MMHG | HEART RATE: 68 BPM | RESPIRATION RATE: 16 BRPM | SYSTOLIC BLOOD PRESSURE: 106 MMHG

## 2017-02-15 DIAGNOSIS — Z45.2 ENCOUNTER FOR ADJUSTMENT OR MANAGEMENT OF VASCULAR ACCESS DEVICE: ICD-10-CM

## 2017-02-15 DIAGNOSIS — C83.30 DLBCL (DIFFUSE LARGE B CELL LYMPHOMA) (HCC): Primary | ICD-10-CM

## 2017-02-15 PROCEDURE — 96417 CHEMO IV INFUS EACH ADDL SEQ: CPT

## 2017-02-15 PROCEDURE — 96413 CHEMO IV INFUSION 1 HR: CPT

## 2017-02-15 PROCEDURE — 96411 CHEMO IV PUSH ADDL DRUG: CPT

## 2017-02-15 PROCEDURE — 96415 CHEMO IV INFUSION ADDL HR: CPT

## 2017-02-15 PROCEDURE — 96375 TX/PRO/DX INJ NEW DRUG ADDON: CPT

## 2017-02-15 PROCEDURE — 96367 TX/PROPH/DG ADDL SEQ IV INF: CPT

## 2017-02-15 RX ORDER — HEPARIN SODIUM (PORCINE) LOCK FLUSH IV SOLN 100 UNIT/ML 100 UNIT/ML
5 SOLUTION INTRAVENOUS ONCE
Status: COMPLETED | OUTPATIENT
Start: 2017-02-15 | End: 2017-02-15

## 2017-02-15 RX ORDER — 0.9 % SODIUM CHLORIDE 0.9 %
VIAL (ML) INJECTION
Status: DISCONTINUED
Start: 2017-02-15 | End: 2017-02-15

## 2017-02-15 RX ORDER — SODIUM CHLORIDE 9 MG/ML
INJECTION, SOLUTION INTRAVENOUS
Status: DISCONTINUED
Start: 2017-02-15 | End: 2017-02-15

## 2017-02-15 RX ORDER — ACETAMINOPHEN 325 MG/1
TABLET ORAL
Status: DISCONTINUED
Start: 2017-02-15 | End: 2017-02-15

## 2017-02-15 RX ORDER — DIPHENHYDRAMINE HCL 25 MG
CAPSULE ORAL
Status: DISCONTINUED
Start: 2017-02-15 | End: 2017-02-15

## 2017-02-15 RX ORDER — DIPHENHYDRAMINE HCL 25 MG
25 CAPSULE ORAL ONCE
Status: COMPLETED | OUTPATIENT
Start: 2017-02-15 | End: 2017-02-15

## 2017-02-15 RX ORDER — HEPARIN SODIUM (PORCINE) LOCK FLUSH IV SOLN 100 UNIT/ML 100 UNIT/ML
SOLUTION INTRAVENOUS
Status: DISCONTINUED
Start: 2017-02-15 | End: 2017-02-15

## 2017-02-15 RX ORDER — ACETAMINOPHEN 325 MG/1
650 TABLET ORAL ONCE
Status: COMPLETED | OUTPATIENT
Start: 2017-02-15 | End: 2017-02-15

## 2017-02-15 RX ADMIN — HEPARIN SODIUM (PORCINE) LOCK FLUSH IV SOLN 100 UNIT/ML 500 UNITS: 100 SOLUTION INTRAVENOUS at 14:30:00

## 2017-02-15 RX ADMIN — ACETAMINOPHEN 650 MG: 325 TABLET ORAL at 11:27:00

## 2017-02-15 RX ADMIN — DIPHENHYDRAMINE HCL 25 MG: 25 MG CAPSULE ORAL at 11:28:00

## 2017-02-15 NOTE — PROGRESS NOTES
Pt here for C6  RCHOP  Chemotherapy treatment. Pt here with his spouse. He denies new complaints/concerns. Throat feeling better since last week when used salt water rinse for sore throat symptoms. He is aware of 3/7 4pm appt with Dr Alee Chou as follow up. nutrition education    Expected outcomes:  able to maintain oral integrity  adequate sleep/rest  family support/coping well  free of infection  maintains/gains weight  no active bleeding  nutritional needs met  optimal lab values  oral membranes intact  pa

## 2017-03-01 ENCOUNTER — APPOINTMENT (OUTPATIENT)
Dept: RADIATION ONCOLOGY | Facility: HOSPITAL | Age: 62
End: 2017-03-01
Attending: RADIOLOGY
Payer: COMMERCIAL

## 2017-03-02 ENCOUNTER — HOSPITAL ENCOUNTER (OUTPATIENT)
Dept: NUCLEAR MEDICINE | Facility: HOSPITAL | Age: 62
Discharge: HOME | End: 2017-03-02
Attending: NURSE PRACTITIONER
Payer: COMMERCIAL

## 2017-03-02 DIAGNOSIS — C83.30 DLBCL (DIFFUSE LARGE B CELL LYMPHOMA) (HCC): ICD-10-CM

## 2017-03-02 LAB — GLUCOSE BLDC GLUCOMTR-MCNC: 150 MG/DL (ref 70–99)

## 2017-03-02 PROCEDURE — 82962 GLUCOSE BLOOD TEST: CPT

## 2017-03-02 PROCEDURE — 78815 PET IMAGE W/CT SKULL-THIGH: CPT

## 2017-03-07 ENCOUNTER — OFFICE VISIT (OUTPATIENT)
Dept: HEMATOLOGY/ONCOLOGY | Facility: HOSPITAL | Age: 62
End: 2017-03-07
Attending: INTERNAL MEDICINE
Payer: COMMERCIAL

## 2017-03-07 VITALS
HEIGHT: 71 IN | RESPIRATION RATE: 16 BRPM | WEIGHT: 186 LBS | DIASTOLIC BLOOD PRESSURE: 90 MMHG | HEART RATE: 76 BPM | TEMPERATURE: 99 F | BODY MASS INDEX: 26.04 KG/M2 | SYSTOLIC BLOOD PRESSURE: 149 MMHG

## 2017-03-07 DIAGNOSIS — C85.99 LYMPHOMA OF TESTIS (HCC): ICD-10-CM

## 2017-03-07 DIAGNOSIS — C83.30 DLBCL (DIFFUSE LARGE B CELL LYMPHOMA) (HCC): Primary | ICD-10-CM

## 2017-03-07 DIAGNOSIS — Z09 CHEMOTHERAPY FOLLOW-UP EXAMINATION: ICD-10-CM

## 2017-03-07 PROCEDURE — 99212 OFFICE O/P EST SF 10 MIN: CPT | Performed by: INTERNAL MEDICINE

## 2017-03-07 PROCEDURE — 99215 OFFICE O/P EST HI 40 MIN: CPT | Performed by: INTERNAL MEDICINE

## 2017-03-07 NOTE — PROGRESS NOTES
MIK       Ivonne Valdes is a 63year old male here for follow up of Dlbcl (diffuse large b cell lymphoma) (hcc)  (primary encounter diagnosis)  Lymphoma of testis (hcc)  Chemotherapy follow-up examination       Pt here today for follow up s/p Cycle Negative for color change and rash. Alopecia    Allergic/Immunologic: Negative for environmental allergies. Neurological: Positive for numbness (mild numbness to fingertips). Negative for dizziness, weakness, light-headedness and headaches.    Hema Children: 4     Occupational History  works for [employer]       Social History Main Topics   Smoking status: Former Smoker  1.00 Packs/Day  For 35.00 Years     Smokeless tobacco: Never Used    Comment: quit 3 yrs ago    Alcohol Use: Yes    Comment: occasion submental, no submandibular, no tonsillar, no preauricular, no posterior auricular and no occipital adenopathy present. Head (left side): No submental, no submandibular, no preauricular, no posterior auricular and no occipital adenopathy present. Hours: No results found for this or any previous visit (from the past 48 hour(s)). Imaging & Referrals:  None   No orders of the defined types were placed in this encounter.

## 2017-03-15 ENCOUNTER — OFFICE VISIT (OUTPATIENT)
Dept: RADIATION ONCOLOGY | Facility: HOSPITAL | Age: 62
End: 2017-03-15
Attending: RADIOLOGY
Payer: COMMERCIAL

## 2017-03-15 VITALS
HEART RATE: 63 BPM | RESPIRATION RATE: 18 BRPM | OXYGEN SATURATION: 98 % | DIASTOLIC BLOOD PRESSURE: 81 MMHG | WEIGHT: 189 LBS | HEIGHT: 70.5 IN | SYSTOLIC BLOOD PRESSURE: 130 MMHG | TEMPERATURE: 98 F | BODY MASS INDEX: 26.76 KG/M2

## 2017-03-15 DIAGNOSIS — C85.99 LYMPHOMA OF TESTIS (HCC): Primary | ICD-10-CM

## 2017-03-15 PROCEDURE — 99212 OFFICE O/P EST SF 10 MIN: CPT

## 2017-03-15 NOTE — PROGRESS NOTES
Nursing Consultation Note  Patient: Evi Lucas  YOB: 1955  Age: 63year old  Radiation Oncologist: Dr. Rogene Runner  Referring Physician: No ref. provider found  Diagnosis:No diagnosis found.   Consult Date: 3/15/2017      Chemo tablet TK 1 T PO  Q 8 H PRN Disp:  Rfl: 0   NEULASTA ONPRO 6 MG/0.6ML Subcutaneous Prefilled Syringe Kit INJECT 0.6 ML INTO THE SKIN ONE TIME Disp:  Rfl: 5   Prochlorperazine Maleate (COMPAZINE) 10 mg tablet Take 1 tablet (10 mg total) by mouth every 6 (si directives. Transportation:  Adequate transportation available for expected visits    Pain Assessment:  Describe in your own words what your pain feels like:  none  Present level of pain:  0    Pt seen in initial consult with Dr Rayshawn Neri.  Completed 6 cycl

## 2017-03-15 NOTE — PROGRESS NOTES
Primary language:  C[place] Verdean  Language line required?  no  Comprehension Ability:  good  Able to read?  yes  Able to write? yes  Communication tools:  na  Patient's ability to learn:  good  Readiness to learn:   Motivated  Learning preferences:  Discussion,

## 2017-03-17 NOTE — CONSULTS
[facility]    PATIENT'S NAME: Jagdeep Jazzy   RADIATION ONCOLOGIST: Lamar Antoine.  Konstantin Kidd MD   PATIENT ACCOUNT #: [de-identified] LOCATION: [address] [place] [address] RECORD #: [id number] YOB: 1955   CONSULTATION DATE: 03/15/2017 patient was then referred to Radiation Oncology to discuss the radial therapeutic component of his treatment. Otherwise, the patient is doing reasonably well at the current time.   He continued working and never missed a day of work during his chemothera nondistended with normoactive bowel sounds and no hepatosplenomegaly. EXTREMITIES:  Without clubbing, cyanosis, or edema. NEUROLOGIC:  Cranial nerves II through XII grossly intact. There are no focal deficits.   GENITOURINARY:  On examination of the cont appropriate. He already has had hair loss from his chemotherapy so this is not an issue. Other than the skin reaction, I would not expect much in the way of other side effects or problems.   I did tell the patient that in the long run, this radiation will MD  d: 03/17/2017 08:22:17  t: 03/17/2017 08:54:12  [facility] [id number]  NAD/    cc: MD Sumit Head. Madolyn Chang, [address] Vedia Sicard, MD

## 2017-04-01 ENCOUNTER — APPOINTMENT (OUTPATIENT)
Dept: RADIATION ONCOLOGY | Facility: HOSPITAL | Age: 62
End: 2017-04-01
Attending: RADIOLOGY
Payer: COMMERCIAL

## 2017-04-06 PROCEDURE — 77334 RADIATION TREATMENT AID(S): CPT | Performed by: RADIOLOGY

## 2017-04-06 PROCEDURE — 77290 THER RAD SIMULAJ FIELD CPLX: CPT | Performed by: RADIOLOGY

## 2017-04-11 PROCEDURE — 77290 THER RAD SIMULAJ FIELD CPLX: CPT | Performed by: RADIOLOGY

## 2017-04-11 PROCEDURE — 77334 RADIATION TREATMENT AID(S): CPT | Performed by: RADIOLOGY

## 2017-04-11 PROCEDURE — 77321 SPECIAL TELETX PORT PLAN: CPT | Performed by: RADIOLOGY

## 2017-04-19 ENCOUNTER — NURSE ONLY (OUTPATIENT)
Dept: HEMATOLOGY/ONCOLOGY | Facility: HOSPITAL | Age: 62
End: 2017-04-19
Attending: INTERNAL MEDICINE
Payer: COMMERCIAL

## 2017-04-19 ENCOUNTER — APPOINTMENT (OUTPATIENT)
Dept: RADIATION ONCOLOGY | Facility: HOSPITAL | Age: 62
End: 2017-04-19
Attending: RADIOLOGY
Payer: COMMERCIAL

## 2017-04-19 VITALS — HEART RATE: 66 BPM | TEMPERATURE: 98 F | DIASTOLIC BLOOD PRESSURE: 78 MMHG | SYSTOLIC BLOOD PRESSURE: 126 MMHG

## 2017-04-19 DIAGNOSIS — Z45.2 ENCOUNTER FOR ADJUSTMENT OR MANAGEMENT OF VASCULAR ACCESS DEVICE: ICD-10-CM

## 2017-04-19 DIAGNOSIS — C83.30 DLBCL (DIFFUSE LARGE B CELL LYMPHOMA) (HCC): Primary | ICD-10-CM

## 2017-04-19 PROCEDURE — 77412 RADIATION TX DELIVERY LVL 3: CPT | Performed by: RADIOLOGY

## 2017-04-19 PROCEDURE — 96523 IRRIG DRUG DELIVERY DEVICE: CPT

## 2017-04-19 PROCEDURE — 77280 THER RAD SIMULAJ FIELD SMPL: CPT | Performed by: RADIOLOGY

## 2017-04-19 PROCEDURE — 77332 RADIATION TREATMENT AID(S): CPT | Performed by: RADIOLOGY

## 2017-04-19 RX ORDER — 0.9 % SODIUM CHLORIDE 0.9 %
VIAL (ML) INJECTION
Status: DISCONTINUED
Start: 2017-04-19 | End: 2017-04-19

## 2017-04-19 RX ORDER — HEPARIN SODIUM (PORCINE) LOCK FLUSH IV SOLN 100 UNIT/ML 100 UNIT/ML
SOLUTION INTRAVENOUS
Status: DISCONTINUED
Start: 2017-04-19 | End: 2017-04-19

## 2017-04-19 RX ORDER — HEPARIN SODIUM (PORCINE) LOCK FLUSH IV SOLN 100 UNIT/ML 100 UNIT/ML
5 SOLUTION INTRAVENOUS ONCE
Status: COMPLETED | OUTPATIENT
Start: 2017-04-19 | End: 2017-04-19

## 2017-04-19 RX ADMIN — HEPARIN SODIUM (PORCINE) LOCK FLUSH IV SOLN 100 UNIT/ML 500 UNITS: 100 SOLUTION INTRAVENOUS at 14:30:00

## 2017-04-19 NOTE — PROGRESS NOTES
Patient to infusion for port flush  Patient arrived ambulatory and without complaints  Port flushed well with good blood return  Port de accessed 2 x 2 and tape to site.  Stable upon discharge with future appoitments

## 2017-04-20 PROCEDURE — 77412 RADIATION TX DELIVERY LVL 3: CPT | Performed by: RADIOLOGY

## 2017-04-21 PROCEDURE — 77412 RADIATION TX DELIVERY LVL 3: CPT | Performed by: RADIOLOGY

## 2017-04-24 PROCEDURE — 77412 RADIATION TX DELIVERY LVL 3: CPT | Performed by: RADIOLOGY

## 2017-04-25 ENCOUNTER — OFFICE VISIT (OUTPATIENT)
Dept: RADIATION ONCOLOGY | Facility: HOSPITAL | Age: 62
End: 2017-04-25
Attending: RADIOLOGY
Payer: COMMERCIAL

## 2017-04-25 VITALS
WEIGHT: 191.63 LBS | DIASTOLIC BLOOD PRESSURE: 84 MMHG | HEIGHT: 70 IN | RESPIRATION RATE: 16 BRPM | HEART RATE: 60 BPM | SYSTOLIC BLOOD PRESSURE: 147 MMHG | BODY MASS INDEX: 27.43 KG/M2

## 2017-04-25 DIAGNOSIS — C85.99 LYMPHOMA OF TESTIS (HCC): Primary | ICD-10-CM

## 2017-04-25 PROCEDURE — 77412 RADIATION TX DELIVERY LVL 3: CPT | Performed by: RADIOLOGY

## 2017-04-25 NOTE — PROGRESS NOTES
[facility] Radiation Treatment Management Note 1-5    Patient:  Carmel Ferraro  Age:  63year old  Visit Diagnosis:    1.  Lymphoma of testis [facility])      Primary Rad/Onc:  Dr. Brandi Her      Site Delivered Dose (Gy) Prescribed Dos

## 2017-04-26 PROCEDURE — 77412 RADIATION TX DELIVERY LVL 3: CPT | Performed by: RADIOLOGY

## 2017-04-28 PROCEDURE — 77336 RADIATION PHYSICS CONSULT: CPT | Performed by: RADIOLOGY

## 2017-04-28 PROCEDURE — 77412 RADIATION TX DELIVERY LVL 3: CPT | Performed by: RADIOLOGY

## 2017-05-01 ENCOUNTER — APPOINTMENT (OUTPATIENT)
Dept: RADIATION ONCOLOGY | Facility: HOSPITAL | Age: 62
End: 2017-05-01
Attending: RADIOLOGY
Payer: COMMERCIAL

## 2017-05-01 PROCEDURE — 77412 RADIATION TX DELIVERY LVL 3: CPT | Performed by: RADIOLOGY

## 2017-05-02 ENCOUNTER — OFFICE VISIT (OUTPATIENT)
Dept: RADIATION ONCOLOGY | Facility: HOSPITAL | Age: 62
End: 2017-05-02
Attending: RADIOLOGY
Payer: COMMERCIAL

## 2017-05-02 VITALS
BODY MASS INDEX: 28 KG/M2 | SYSTOLIC BLOOD PRESSURE: 137 MMHG | HEART RATE: 63 BPM | RESPIRATION RATE: 16 BRPM | DIASTOLIC BLOOD PRESSURE: 82 MMHG | TEMPERATURE: 98 F | WEIGHT: 193 LBS

## 2017-05-02 DIAGNOSIS — C85.99 LYMPHOMA OF TESTIS (HCC): Primary | ICD-10-CM

## 2017-05-02 PROCEDURE — 77412 RADIATION TX DELIVERY LVL 3: CPT | Performed by: RADIOLOGY

## 2017-05-02 NOTE — PROGRESS NOTES
[facility] Radiation Treatment Management Note 6-10    Patient:  Cesar Jennings  Age:  [address] old  Visit Diagnosis:    1.  Lymphoma of testis [facility])      Primary Rad/Onc:  Dr. Martinez [facility]      Site Delivered Dose (Gy) Prescribed Do

## 2017-05-03 PROCEDURE — 77412 RADIATION TX DELIVERY LVL 3: CPT | Performed by: RADIOLOGY

## 2017-05-04 PROCEDURE — 77412 RADIATION TX DELIVERY LVL 3: CPT | Performed by: RADIOLOGY

## 2017-05-05 PROCEDURE — 77336 RADIATION PHYSICS CONSULT: CPT | Performed by: RADIOLOGY

## 2017-05-05 PROCEDURE — 77412 RADIATION TX DELIVERY LVL 3: CPT | Performed by: RADIOLOGY

## 2017-05-08 PROCEDURE — 77412 RADIATION TX DELIVERY LVL 3: CPT | Performed by: RADIOLOGY

## 2017-05-09 ENCOUNTER — OFFICE VISIT (OUTPATIENT)
Dept: RADIATION ONCOLOGY | Facility: HOSPITAL | Age: 62
End: 2017-05-09
Attending: RADIOLOGY
Payer: COMMERCIAL

## 2017-05-09 VITALS
SYSTOLIC BLOOD PRESSURE: 133 MMHG | TEMPERATURE: 98 F | RESPIRATION RATE: 16 BRPM | BODY MASS INDEX: 28.29 KG/M2 | WEIGHT: 197.63 LBS | HEIGHT: 70 IN | DIASTOLIC BLOOD PRESSURE: 82 MMHG | HEART RATE: 69 BPM

## 2017-05-09 DIAGNOSIS — C85.99 LYMPHOMA OF TESTIS (HCC): Primary | ICD-10-CM

## 2017-05-09 PROCEDURE — 77412 RADIATION TX DELIVERY LVL 3: CPT | Performed by: RADIOLOGY

## 2017-05-09 NOTE — PROGRESS NOTES
[facility] Radiation Treatment Management Note 11-15    Patient:  Adry Spencer  Age:  63year old  Visit Diagnosis:    1.  Lymphoma of testis [facility])      Primary Rad/Onc:  Dr. Rachael March      Site Delivered Dose (Gy) Prescribed D

## 2017-05-10 PROCEDURE — 77412 RADIATION TX DELIVERY LVL 3: CPT | Performed by: RADIOLOGY

## 2017-05-11 PROCEDURE — 77412 RADIATION TX DELIVERY LVL 3: CPT | Performed by: RADIOLOGY

## 2017-05-12 PROCEDURE — 77412 RADIATION TX DELIVERY LVL 3: CPT | Performed by: RADIOLOGY

## 2017-05-12 PROCEDURE — 77336 RADIATION PHYSICS CONSULT: CPT | Performed by: RADIOLOGY

## 2017-05-15 PROCEDURE — 77412 RADIATION TX DELIVERY LVL 3: CPT | Performed by: RADIOLOGY

## 2017-05-16 ENCOUNTER — OFFICE VISIT (OUTPATIENT)
Dept: RADIATION ONCOLOGY | Facility: HOSPITAL | Age: 62
End: 2017-05-16
Attending: RADIOLOGY
Payer: COMMERCIAL

## 2017-05-16 VITALS
WEIGHT: 193 LBS | OXYGEN SATURATION: 97 % | BODY MASS INDEX: 28 KG/M2 | RESPIRATION RATE: 16 BRPM | DIASTOLIC BLOOD PRESSURE: 87 MMHG | SYSTOLIC BLOOD PRESSURE: 139 MMHG | TEMPERATURE: 98 F | HEART RATE: 65 BPM

## 2017-05-16 DIAGNOSIS — C85.99 LYMPHOMA OF TESTIS (HCC): Primary | ICD-10-CM

## 2017-05-16 PROCEDURE — 77412 RADIATION TX DELIVERY LVL 3: CPT | Performed by: RADIOLOGY

## 2017-05-16 NOTE — PROGRESS NOTES
[facility] Radiation Treatment Management Note 16-20    Patient:  Vega Lindsay  Age:  63year old  Visit Diagnosis:    1.  Lymphoma of testis [facility])      Primary Rad/Onc:  Dr. Braga Done      Site Delivered Dose (Gy) Prescribed D

## 2017-05-17 PROCEDURE — 77412 RADIATION TX DELIVERY LVL 3: CPT | Performed by: RADIOLOGY

## 2017-05-19 PROCEDURE — 77336 RADIATION PHYSICS CONSULT: CPT | Performed by: RADIOLOGY

## 2017-05-19 NOTE — PROGRESS NOTES
[facility]    PATIENT'S NAME: Massimo Cooper   RADIATION ONCOLOGIST: Juan F Carson.  Greg Crum MD   PATIENT ACCOUNT #: [de-identified] LOCATION: [address] [place] [address] RECORD #: [id number] YOB: 1955   DATE: 05/17/2017       RADIATION ONC of 28 elapsed days in which he received all 20 prescribed radiation treatments.     TREATMENT SUMMARY:  Based upon the fact that the scrotum and contralateral testicle are considered a sanctuary site, it is generally accepted that these areas require radiot

## 2017-05-24 ENCOUNTER — OFFICE VISIT (OUTPATIENT)
Dept: HEMATOLOGY/ONCOLOGY | Facility: HOSPITAL | Age: 62
End: 2017-05-24
Attending: INTERNAL MEDICINE
Payer: COMMERCIAL

## 2017-05-24 VITALS
HEIGHT: 70 IN | SYSTOLIC BLOOD PRESSURE: 142 MMHG | HEART RATE: 66 BPM | BODY MASS INDEX: 27.63 KG/M2 | RESPIRATION RATE: 16 BRPM | WEIGHT: 193 LBS | TEMPERATURE: 99 F | DIASTOLIC BLOOD PRESSURE: 86 MMHG

## 2017-05-24 DIAGNOSIS — C85.99 LYMPHOMA OF TESTIS (HCC): ICD-10-CM

## 2017-05-24 DIAGNOSIS — C83.30 DLBCL (DIFFUSE LARGE B CELL LYMPHOMA) (HCC): Primary | ICD-10-CM

## 2017-05-24 PROCEDURE — 99212 OFFICE O/P EST SF 10 MIN: CPT | Performed by: INTERNAL MEDICINE

## 2017-05-24 NOTE — PROGRESS NOTES
MIK       Miranda Abrams is a 63year old male here for follow up of Dlbcl (diffuse large b cell lymphoma) (hcc)  (primary encounter diagnosis)  Lymphoma of testis (hcc)       Pt here today for follow up s/p Cycle 6 RCHOP.  C/o took longer to recover bruise/bleed easily. Psychiatric/Behavioral: The patient is not nervous/anxious. Denies depression.            Current Outpatient Prescriptions:  predniSONE 20 MG Oral Tab Take 5 Tabs by mouth x 5 days Disp: 25 tablet Rfl: 0   NEULASTA 6 MG/0.6ML of Onset   • Heart Disease Mother    • Heart Disease Father    • Breast Cancer Sister 36         PHYSICAL EXAM:    /86 mmHg  Pulse 66  Temp(Src) 98.5 °F (36.9 °C) (Oral)  Resp 16  Ht 1.778 m (5' 10\")  Wt 87.544 kg (193 lb)  BMI 27.69 kg/m2  Wt Readi present. He has no axillary adenopathy. Right axillary: No pectoral and no lateral adenopathy present. Left axillary: No pectoral and no lateral adenopathy present. Right: No inguinal and no supraclavicular adenopathy present.

## 2017-05-31 ENCOUNTER — NURSE ONLY (OUTPATIENT)
Dept: HEMATOLOGY/ONCOLOGY | Facility: HOSPITAL | Age: 62
End: 2017-05-31
Attending: INTERNAL MEDICINE
Payer: COMMERCIAL

## 2017-05-31 DIAGNOSIS — C85.99 LYMPHOMA OF TESTIS (HCC): ICD-10-CM

## 2017-05-31 DIAGNOSIS — Z45.2 ENCOUNTER FOR ADJUSTMENT OR MANAGEMENT OF VASCULAR ACCESS DEVICE: ICD-10-CM

## 2017-05-31 DIAGNOSIS — C83.30 DLBCL (DIFFUSE LARGE B CELL LYMPHOMA) (HCC): Primary | ICD-10-CM

## 2017-05-31 PROCEDURE — 96523 IRRIG DRUG DELIVERY DEVICE: CPT

## 2017-05-31 RX ORDER — 0.9 % SODIUM CHLORIDE 0.9 %
VIAL (ML) INJECTION
Status: DISCONTINUED
Start: 2017-05-31 | End: 2017-05-31

## 2017-05-31 RX ORDER — HEPARIN SODIUM (PORCINE) LOCK FLUSH IV SOLN 100 UNIT/ML 100 UNIT/ML
5 SOLUTION INTRAVENOUS ONCE
Status: COMPLETED | OUTPATIENT
Start: 2017-05-31 | End: 2017-05-31

## 2017-05-31 RX ORDER — HEPARIN SODIUM (PORCINE) LOCK FLUSH IV SOLN 100 UNIT/ML 100 UNIT/ML
SOLUTION INTRAVENOUS
Status: COMPLETED
Start: 2017-05-31 | End: 2017-05-31

## 2017-05-31 RX ADMIN — HEPARIN SODIUM (PORCINE) LOCK FLUSH IV SOLN 100 UNIT/ML 500 UNITS: 100 SOLUTION INTRAVENOUS at 15:42:00

## 2017-06-06 ENCOUNTER — OFFICE VISIT (OUTPATIENT)
Dept: HEMATOLOGY/ONCOLOGY | Facility: HOSPITAL | Age: 62
End: 2017-06-06
Attending: NURSE PRACTITIONER
Payer: COMMERCIAL

## 2017-06-06 DIAGNOSIS — C83.30 DLBCL (DIFFUSE LARGE B CELL LYMPHOMA) (HCC): Primary | ICD-10-CM

## 2017-06-06 DIAGNOSIS — Z09 CHEMOTHERAPY FOLLOW-UP EXAMINATION: ICD-10-CM

## 2017-06-06 DIAGNOSIS — C85.99 LYMPHOMA OF TESTIS (HCC): ICD-10-CM

## 2017-06-06 PROCEDURE — 99212 OFFICE O/P EST SF 10 MIN: CPT | Performed by: NURSE PRACTITIONER

## 2017-06-06 PROCEDURE — 99213 OFFICE O/P EST LOW 20 MIN: CPT | Performed by: NURSE PRACTITIONER

## 2017-06-06 NOTE — PROGRESS NOTES
MIK       Xavier Adam is a [address] old male here for follow up of Dlbcl (diffuse large b cell lymphoma) (hcc)  (primary encounter diagnosis)  Lymphoma of testis (hcc)  Chemotherapy follow-up examination       Pt here today for follow up s/p Cycle color change and rash. Allergic/Immunologic: Negative for environmental allergies. Neurological: Negative for dizziness, weakness, light-headedness, numbness and headaches. Hematological: Negative for adenopathy. Does not bruise/bleed easily.    Psych Smokeless tobacco: Never Used    Comment: quit 3 yrs ago    Alcohol Use: Yes    Comment: occasionally 1-2 drinks on weekends    Drug Use: No    Sexual Activity: Not on file   Not on file  Other Topics Concern    Caffeine Concern Yes    Comment: Coffee weekly   Number of cycles planned 4 weeks   Plan for appointments and lab testing - CBC CMP today  Verified Consent to Chemotherapy/Biotherapy Cancer Treatment form signed by patient and provider:  Yes    IT administration risks:  Potential leaking of drug new unexpected symptom –such as change in vision, swelling in arm or leg  • Increase in numbness and tingling of hands or feet  • Unusual bleeding (nose bleeds, blood in urine, stool or phlegm)  • Pain with urination  • Persistent mood changes, depression, o If soreness or sores develop, contact the office. Diarrhea or Constipation  o If you have persistent diarrhea or constipation, please contact the triage nurse for further instructions.     Skin Care  o Avoid direct sunlight.  o Wear a broad-spectrum toilet. 6. Wash any skin area that has come in contact with urine or stool. Safety for my family and friends:  1. Hugging and kissing is safe for you and your partner or family members.   You can visit, sit with, hug and kiss the children in your life

## 2017-06-06 NOTE — PATIENT INSTRUCTIONS
Medication Education Record: IT Therapy ( Intrathecal )    Learner:  Patient    Barriers / Limitations:  None    Diagnosis:  Diffuse Large B cell lymphoma     IT Cancer Treatment Name(s): Methotrexate  IT Cancer Treatment Frequency weekly   Number of c blisters on the lips  • Difficulty breathing, chest pain, or new cough  • Excessive tiredness or weakness, confusion or loss of balance  • New rash  • Tingling or burning, redness, swelling of the palms of hands or soles of feet  • Sudden new unexpected sy mild mouth rinse (made with 1 quart water, 1 teaspoon salt, and 1 teaspoon baking soda, shake before using)   o Avoid commercial mouthwashes that contain alcohol, alcoholic or acidic drinks or tobacco  o An acceptable mouthwash is Biotene®   o If soreness should be placed in a sealed plastic bag for disposal, separate from other trash. 4. Toilets should be flushed twice with the lid closed while taking this medication and for 48 hours after the last dose. 5. Wash your hands after using the toilet.   6. Was

## 2017-06-07 ENCOUNTER — HOSPITAL ENCOUNTER (OUTPATIENT)
Dept: INTERVENTIONAL RADIOLOGY/VASCULAR | Facility: HOSPITAL | Age: 62
Discharge: HOME | End: 2017-06-07
Attending: INTERNAL MEDICINE | Admitting: INTERNAL MEDICINE
Payer: COMMERCIAL

## 2017-06-07 VITALS
RESPIRATION RATE: 15 BRPM | SYSTOLIC BLOOD PRESSURE: 141 MMHG | DIASTOLIC BLOOD PRESSURE: 78 MMHG | HEART RATE: 67 BPM | OXYGEN SATURATION: 98 %

## 2017-06-07 DIAGNOSIS — C83.30 DLBCL (DIFFUSE LARGE B CELL LYMPHOMA) (HCC): ICD-10-CM

## 2017-06-07 DIAGNOSIS — C83.30 DLBCL (DIFFUSE LARGE B CELL LYMPHOMA) (HCC): Primary | ICD-10-CM

## 2017-06-07 DIAGNOSIS — C85.99 LYMPHOMA OF TESTIS (HCC): ICD-10-CM

## 2017-06-07 PROCEDURE — 96450 CHEMOTHERAPY INTO CNS: CPT

## 2017-06-07 PROCEDURE — 88108 CYTOPATH CONCENTRATE TECH: CPT | Performed by: INTERNAL MEDICINE

## 2017-06-07 PROCEDURE — 3E0R305 INTRODUCTION OF OTHER ANTINEOPLASTIC INTO SPINAL CANAL, PERCUTANEOUS APPROACH: ICD-10-PCS | Performed by: RADIOLOGY

## 2017-06-07 NOTE — PROCEDURES
[facility] HOSP - [facility]  Procedure Note    Jenny Augustin Patient Status:  Outpatient in a Bed    1955 MRN [id number]   Location [facility] Attending Aramis Schaefer MD   Hosp Day # 0 PCP Marva Lima MD     P

## 2017-06-07 NOTE — PROGRESS NOTES
Patient s/p Intrathecal Chemo administration. No sedation was used. Patient denies pain and discomfort. Afebrile. Bedrest for 2 hours.

## 2017-06-07 NOTE — PROGRESS NOTES
[facility] HOSP - [facility]    Progress Note    Kahlil Kraus Patient Status:  Outpatient in a Bed    1955 MRN [id number]   Location [facility] Attending Luis Wylie MD   Hosp Day # 0 PCP Delonte Lim MD

## 2017-06-09 RX ORDER — METFORMIN HYDROCHLORIDE 750 MG/1
TABLET, EXTENDED RELEASE ORAL
Qty: 90 TABLET | Refills: 0 | OUTPATIENT
Start: 2017-06-09

## 2017-06-09 RX ORDER — LOSARTAN POTASSIUM AND HYDROCHLOROTHIAZIDE 12.5; 5 MG/1; MG/1
TABLET ORAL
Qty: 30 TABLET | Refills: 0 | OUTPATIENT
Start: 2017-06-09

## 2017-06-14 ENCOUNTER — HOSPITAL ENCOUNTER (OUTPATIENT)
Dept: INTERVENTIONAL RADIOLOGY/VASCULAR | Facility: HOSPITAL | Age: 62
Discharge: HOME | End: 2017-06-14
Attending: INTERNAL MEDICINE | Admitting: INTERNAL MEDICINE
Payer: COMMERCIAL

## 2017-06-14 VITALS
OXYGEN SATURATION: 98 % | RESPIRATION RATE: 18 BRPM | DIASTOLIC BLOOD PRESSURE: 67 MMHG | SYSTOLIC BLOOD PRESSURE: 115 MMHG | HEART RATE: 76 BPM

## 2017-06-14 DIAGNOSIS — C85.99 LYMPHOMA OF TESTIS (HCC): ICD-10-CM

## 2017-06-14 DIAGNOSIS — C83.30 DLBCL (DIFFUSE LARGE B CELL LYMPHOMA) (HCC): Primary | ICD-10-CM

## 2017-06-14 DIAGNOSIS — C83.30 DLBCL (DIFFUSE LARGE B CELL LYMPHOMA) (HCC): ICD-10-CM

## 2017-06-14 DIAGNOSIS — G97.1 HEADACHE FOLLOWING LUMBAR PUNCTURE: Primary | ICD-10-CM

## 2017-06-14 PROCEDURE — 96450 CHEMOTHERAPY INTO CNS: CPT

## 2017-06-14 PROCEDURE — 3E0R305 INTRODUCTION OF OTHER ANTINEOPLASTIC INTO SPINAL CANAL, PERCUTANEOUS APPROACH: ICD-10-PCS | Performed by: RADIOLOGY

## 2017-06-14 PROCEDURE — 77003 FLUOROGUIDE FOR SPINE INJECT: CPT

## 2017-06-14 RX ORDER — SODIUM CHLORIDE 9 MG/ML
INJECTION, SOLUTION INTRAVENOUS
Status: COMPLETED
Start: 2017-06-14 | End: 2017-06-14

## 2017-06-14 RX ORDER — SODIUM CHLORIDE 9 MG/ML
INJECTION, SOLUTION INTRAVENOUS CONTINUOUS
Status: DISCONTINUED | OUTPATIENT
Start: 2017-06-14 | End: 2017-06-14

## 2017-06-14 RX ADMIN — SODIUM CHLORIDE: 9 INJECTION, SOLUTION INTRAVENOUS at 12:05:00

## 2017-06-14 NOTE — PROGRESS NOTES
[facility] HOSP - [facility]    Progress Note    Reina Azul Patient Status:  Outpatient in a Bed    1955 MRN [id number]   Location [facility] Attending Dago Lim MD   Hosp Day # 0 PCP Carley Finney MD procedure room prior to procedure with Garrison Da Silva RN, Ashley Officer RN, ENZO Ryan RN and Dr. Allison Wolff. Time out occurred prior to administration of Methotrexate preservative free 12mg in 3ml preservative free Nacl checked by REBECCA Ryan RN, Ashley Officer and

## 2017-06-14 NOTE — PROGRESS NOTES
Patient s/p Lumbar puncture with intrathecal chemotherapy. Patient had complaints of persistent headaches prior to the procedure. A Liter of 0.9NS was started per Dr. Lenora Woody order before the procedure.  Patient was encouraged to drink caffeine to help

## 2017-06-14 NOTE — PROCEDURES
[facility] HOSP - [facility]  Procedure Note    Reina Azul Patient Status:  Outpatient in a Bed    1955 MRN [id number]   Location [facility] Attending Dago Lim MD   Hosp Day # 0 PCP Carley Finney MD     P

## 2017-06-15 ENCOUNTER — OFFICE VISIT (OUTPATIENT)
Dept: HEMATOLOGY/ONCOLOGY | Facility: HOSPITAL | Age: 62
End: 2017-06-15
Attending: INTERNAL MEDICINE
Payer: COMMERCIAL

## 2017-06-15 ENCOUNTER — TELEPHONE (OUTPATIENT)
Dept: HEMATOLOGY/ONCOLOGY | Facility: HOSPITAL | Age: 62
End: 2017-06-15

## 2017-06-15 VITALS
HEART RATE: 63 BPM | DIASTOLIC BLOOD PRESSURE: 83 MMHG | SYSTOLIC BLOOD PRESSURE: 141 MMHG | RESPIRATION RATE: 16 BRPM | TEMPERATURE: 98 F

## 2017-06-15 DIAGNOSIS — C83.30 DLBCL (DIFFUSE LARGE B CELL LYMPHOMA) (HCC): Primary | ICD-10-CM

## 2017-06-15 DIAGNOSIS — Z95.828 PORTACATH IN PLACE: ICD-10-CM

## 2017-06-15 DIAGNOSIS — G97.1 HEADACHE FOLLOWING LUMBAR PUNCTURE: ICD-10-CM

## 2017-06-15 DIAGNOSIS — Z45.2 ENCOUNTER FOR ADJUSTMENT OR MANAGEMENT OF VASCULAR ACCESS DEVICE: ICD-10-CM

## 2017-06-15 PROCEDURE — 96361 HYDRATE IV INFUSION ADD-ON: CPT

## 2017-06-15 PROCEDURE — 96360 HYDRATION IV INFUSION INIT: CPT

## 2017-06-15 RX ORDER — HEPARIN SODIUM (PORCINE) LOCK FLUSH IV SOLN 100 UNIT/ML 100 UNIT/ML
5 SOLUTION INTRAVENOUS ONCE
Status: COMPLETED | OUTPATIENT
Start: 2017-06-15 | End: 2017-06-15

## 2017-06-15 RX ORDER — SODIUM CHLORIDE 9 MG/ML
INJECTION, SOLUTION INTRAVENOUS
Status: DISCONTINUED
Start: 2017-06-15 | End: 2017-06-15

## 2017-06-15 RX ORDER — 0.9 % SODIUM CHLORIDE 0.9 %
VIAL (ML) INJECTION
Status: DISCONTINUED
Start: 2017-06-15 | End: 2017-06-15

## 2017-06-15 RX ORDER — HEPARIN SODIUM (PORCINE) LOCK FLUSH IV SOLN 100 UNIT/ML 100 UNIT/ML
SOLUTION INTRAVENOUS
Status: COMPLETED
Start: 2017-06-15 | End: 2017-06-15

## 2017-06-15 RX ADMIN — HEPARIN SODIUM (PORCINE) LOCK FLUSH IV SOLN 100 UNIT/ML 500 UNITS: 100 SOLUTION INTRAVENOUS at 07:53:00

## 2017-06-15 NOTE — TELEPHONE ENCOUNTER
F/U phone call post IT chemo 6/14/17 and IV hydration today in the [facility]. Pt states \" I feel much better and headache went away. \" Pt wanted to know if hydration before IT chemo would help prevent headache.  Reinforced to pt that needs to take it e

## 2017-06-15 NOTE — PROGRESS NOTES
Patient to Infusion for Hydration. Patient to Infusion ambulatory.   Patient states he had initial Intrathecal chemotherapy last Wednesday, states after the injection he got headache which he had until yesterday when he received his second Intrathecal Chem

## 2017-06-19 ENCOUNTER — TELEPHONE (OUTPATIENT)
Dept: FAMILY MEDICINE CLINIC | Facility: CLINIC | Age: 62
End: 2017-06-19

## 2017-06-19 DIAGNOSIS — E11.9 CONTROLLED TYPE 2 DIABETES MELLITUS WITHOUT COMPLICATION, WITHOUT LONG-TERM CURRENT USE OF INSULIN (HCC): Primary | ICD-10-CM

## 2017-06-19 RX ORDER — METFORMIN HYDROCHLORIDE 750 MG/1
750 TABLET, EXTENDED RELEASE ORAL
Qty: 30 TABLET | Refills: 0 | Status: SHIPPED | OUTPATIENT
Start: 2017-06-19 | End: 2017-07-17

## 2017-06-21 ENCOUNTER — HOSPITAL ENCOUNTER (OUTPATIENT)
Dept: INTERVENTIONAL RADIOLOGY/VASCULAR | Facility: HOSPITAL | Age: 62
Discharge: HOME | End: 2017-06-21
Attending: INTERNAL MEDICINE | Admitting: INTERNAL MEDICINE
Payer: COMMERCIAL

## 2017-06-21 VITALS
SYSTOLIC BLOOD PRESSURE: 116 MMHG | DIASTOLIC BLOOD PRESSURE: 74 MMHG | OXYGEN SATURATION: 96 % | HEART RATE: 67 BPM | RESPIRATION RATE: 18 BRPM

## 2017-06-21 DIAGNOSIS — C83.30 DLBCL (DIFFUSE LARGE B CELL LYMPHOMA) (HCC): ICD-10-CM

## 2017-06-21 DIAGNOSIS — C85.99 LYMPHOMA OF TESTIS (HCC): ICD-10-CM

## 2017-06-21 PROCEDURE — 96450 CHEMOTHERAPY INTO CNS: CPT

## 2017-06-21 PROCEDURE — 3E0R305 INTRODUCTION OF OTHER ANTINEOPLASTIC INTO SPINAL CANAL, PERCUTANEOUS APPROACH: ICD-10-PCS | Performed by: RADIOLOGY

## 2017-06-21 RX ORDER — SODIUM CHLORIDE 9 MG/ML
INJECTION, SOLUTION INTRAVENOUS
Status: COMPLETED
Start: 2017-06-21 | End: 2017-06-21

## 2017-06-21 RX ORDER — HEPARIN SODIUM (PORCINE) LOCK FLUSH IV SOLN 100 UNIT/ML 100 UNIT/ML
SOLUTION INTRAVENOUS
Status: DISCONTINUED
Start: 2017-06-21 | End: 2017-06-21

## 2017-06-21 RX ORDER — SODIUM CHLORIDE 9 MG/ML
INJECTION, SOLUTION INTRAVENOUS ONCE
Status: COMPLETED | OUTPATIENT
Start: 2017-06-21 | End: 2017-06-21

## 2017-06-21 RX ADMIN — SODIUM CHLORIDE: 9 INJECTION, SOLUTION INTRAVENOUS at 14:33:00

## 2017-06-21 NOTE — PROCEDURES
Week #3 Intrathecal Methotrexate. Prior to procedure pt states only had \" a little headache. \" post last weeks procedure. Pt went back to work post procedure last week.  Reinforced teaching to rest, no heavy lifting or moving, hydrate and to drink caffei

## 2017-06-21 NOTE — PROCEDURES
[facility] HOSP - [facility]  Procedure Note    Charlette Walters Patient Status:  Outpatient in a Bed    1955 MRN [id number]   Location [facility] Attending Mer Okeefe MD   Hosp Day # 0 PCP Lori العراقي MD     P

## 2017-06-21 NOTE — H&P
[facility] Patient Status:  Outpatient in a Bed    1955 MRN [id number]   Location [facility] Attending Dago Lim MD   Hosp Day # 0 PCP Carley Finney MD Kirti Miller MD  6/21/2017  1:46 PM

## 2017-06-22 ENCOUNTER — TELEPHONE (OUTPATIENT)
Dept: HEMATOLOGY/ONCOLOGY | Facility: HOSPITAL | Age: 62
End: 2017-06-22

## 2017-06-22 DIAGNOSIS — Z01.89 ENCOUNTER FOR ROUTINE LABORATORY TESTING: Primary | ICD-10-CM

## 2017-06-22 DIAGNOSIS — E11.9 TYPE 2 DIABETES MELLITUS WITHOUT COMPLICATION, WITHOUT LONG-TERM CURRENT USE OF INSULIN (HCC): ICD-10-CM

## 2017-06-22 NOTE — PROGRESS NOTES
Patient post Lumbar Puncture with Intrathecal Chemotherapy administration. A Liter of 0.9 NS was administered as ordered. Denies any headache. Instructed patient to rest and not do any strenuous activity.

## 2017-06-22 NOTE — TELEPHONE ENCOUNTER
F/U phone call post week #3 of intrathecal MTX 6/20/17. Pt states \" I feel real good today. Only a little headache after the procedure. \" Pt working today and reinforced to rest and no heavy lifting or moving, fluids encouraged and to drink beverages with

## 2017-06-24 ENCOUNTER — APPOINTMENT (OUTPATIENT)
Dept: LAB | Facility: HOSPITAL | Age: 62
End: 2017-06-24
Payer: COMMERCIAL

## 2017-06-24 DIAGNOSIS — E11.9 TYPE 2 DIABETES MELLITUS WITHOUT COMPLICATION, WITHOUT LONG-TERM CURRENT USE OF INSULIN (HCC): ICD-10-CM

## 2017-06-24 DIAGNOSIS — Z01.89 ENCOUNTER FOR ROUTINE LABORATORY TESTING: ICD-10-CM

## 2017-06-24 PROCEDURE — 81003 URINALYSIS AUTO W/O SCOPE: CPT

## 2017-06-24 PROCEDURE — 36415 COLL VENOUS BLD VENIPUNCTURE: CPT

## 2017-06-24 PROCEDURE — 80053 COMPREHEN METABOLIC PANEL: CPT

## 2017-06-24 PROCEDURE — 82570 ASSAY OF URINE CREATININE: CPT

## 2017-06-24 PROCEDURE — 80061 LIPID PANEL: CPT

## 2017-06-24 PROCEDURE — 82043 UR ALBUMIN QUANTITATIVE: CPT

## 2017-06-24 PROCEDURE — 85027 COMPLETE CBC AUTOMATED: CPT

## 2017-06-24 PROCEDURE — 83036 HEMOGLOBIN GLYCOSYLATED A1C: CPT

## 2017-06-28 ENCOUNTER — HOSPITAL ENCOUNTER (OUTPATIENT)
Dept: INTERVENTIONAL RADIOLOGY/VASCULAR | Facility: HOSPITAL | Age: 62
Discharge: HOME | End: 2017-06-28
Attending: INTERNAL MEDICINE | Admitting: INTERNAL MEDICINE
Payer: COMMERCIAL

## 2017-06-28 VITALS
SYSTOLIC BLOOD PRESSURE: 113 MMHG | DIASTOLIC BLOOD PRESSURE: 68 MMHG | RESPIRATION RATE: 18 BRPM | OXYGEN SATURATION: 98 % | HEART RATE: 63 BPM

## 2017-06-28 DIAGNOSIS — C85.99 LYMPHOMA OF TESTIS (HCC): ICD-10-CM

## 2017-06-28 DIAGNOSIS — C83.30 DLBCL (DIFFUSE LARGE B CELL LYMPHOMA) (HCC): ICD-10-CM

## 2017-06-28 PROCEDURE — 3E0R305 INTRODUCTION OF OTHER ANTINEOPLASTIC INTO SPINAL CANAL, PERCUTANEOUS APPROACH: ICD-10-PCS | Performed by: RADIOLOGY

## 2017-06-28 PROCEDURE — 96450 CHEMOTHERAPY INTO CNS: CPT

## 2017-06-28 RX ORDER — SODIUM CHLORIDE 9 MG/ML
INJECTION, SOLUTION INTRAVENOUS ONCE
Status: COMPLETED | OUTPATIENT
Start: 2017-06-28 | End: 2017-06-28

## 2017-06-28 RX ORDER — HEPARIN SODIUM (PORCINE) LOCK FLUSH IV SOLN 100 UNIT/ML 100 UNIT/ML
SOLUTION INTRAVENOUS
Status: DISCONTINUED
Start: 2017-06-28 | End: 2017-06-28

## 2017-06-28 RX ORDER — SODIUM CHLORIDE 9 MG/ML
INJECTION, SOLUTION INTRAVENOUS
Status: COMPLETED
Start: 2017-06-28 | End: 2017-06-28

## 2017-06-28 RX ADMIN — SODIUM CHLORIDE: 9 INJECTION, SOLUTION INTRAVENOUS at 12:30:00

## 2017-06-28 NOTE — PROGRESS NOTES
Week #4 of 4 Intrathecal Methotrexate     Prior to procedure pt states \"only had a little headache after last weeks treatment\" Reinforced with pt to rest and relax after procedure to prevent a headache and spinal fluid leakage.  Pt has gone to work after

## 2017-06-28 NOTE — PROCEDURES
[facility] HOSP - [facility]  Procedure Note    Venus Marquez Patient Status:  Outpatient in a Bed    1955 MRN [id number]   Location [facility] Attending Yaron Calderon MD   Hosp Day # 0 PCP Sylvia Krishnan MD     P

## 2017-06-29 ENCOUNTER — TELEPHONE (OUTPATIENT)
Dept: HEMATOLOGY/ONCOLOGY | Facility: HOSPITAL | Age: 62
End: 2017-06-29

## 2017-06-29 NOTE — TELEPHONE ENCOUNTER
Called patient post #4 IT, patient states small H/A better now, taking fluids, refusing any hydration today, reinforced pushing fluids, no lifting and calling for any problems. To Set up appt with Dr Oriana Martinez.

## 2017-06-30 ENCOUNTER — TELEPHONE (OUTPATIENT)
Dept: HEMATOLOGY/ONCOLOGY | Facility: HOSPITAL | Age: 62
End: 2017-06-30

## 2017-06-30 NOTE — TELEPHONE ENCOUNTER
F/U phone call post week #4 IT MTX on 6/28/17. Left message and Instructed to call if having headaches and will schedule hydration in [facility]. Instructed to continue to stay hydrated, fluids and caffeinated beverages encouraged.

## 2017-07-06 ENCOUNTER — TELEPHONE (OUTPATIENT)
Dept: FAMILY MEDICINE CLINIC | Facility: CLINIC | Age: 62
End: 2017-07-06

## 2017-07-06 NOTE — TELEPHONE ENCOUNTER
----- Message from Jose Grubbs MD sent at 6/24/2017  2:38 PM CDT -----  Needs f/u appointment to review results.

## 2017-07-17 ENCOUNTER — OFFICE VISIT (OUTPATIENT)
Dept: FAMILY MEDICINE CLINIC | Facility: CLINIC | Age: 62
End: 2017-07-17

## 2017-07-17 VITALS
BODY MASS INDEX: 27.63 KG/M2 | OXYGEN SATURATION: 98 % | DIASTOLIC BLOOD PRESSURE: 70 MMHG | SYSTOLIC BLOOD PRESSURE: 130 MMHG | HEART RATE: 57 BPM | HEIGHT: 70 IN | WEIGHT: 193 LBS

## 2017-07-17 DIAGNOSIS — Z87.891 FORMER SMOKER: ICD-10-CM

## 2017-07-17 DIAGNOSIS — Z13.31 DEPRESSION SCREENING: ICD-10-CM

## 2017-07-17 DIAGNOSIS — C85.99 LYMPHOMA OF TESTIS (HCC): ICD-10-CM

## 2017-07-17 DIAGNOSIS — Z00.01 ENCOUNTER FOR ROUTINE ADULT HEALTH EXAMINATION WITH ABNORMAL FINDINGS: Primary | ICD-10-CM

## 2017-07-17 DIAGNOSIS — IMO0001 UNCONTROLLED TYPE 2 DIABETES MELLITUS WITHOUT COMPLICATION, WITHOUT LONG-TERM CURRENT USE OF INSULIN: ICD-10-CM

## 2017-07-17 DIAGNOSIS — E66.3 OVERWEIGHT: ICD-10-CM

## 2017-07-17 DIAGNOSIS — E78.1 PURE HYPERGLYCERIDEMIA: ICD-10-CM

## 2017-07-17 DIAGNOSIS — I10 ESSENTIAL HYPERTENSION WITH GOAL BLOOD PRESSURE LESS THAN 140/90: ICD-10-CM

## 2017-07-17 DIAGNOSIS — C61 PROSTATE CANCER (HCC): ICD-10-CM

## 2017-07-17 DIAGNOSIS — C83.30 DLBCL (DIFFUSE LARGE B CELL LYMPHOMA) (HCC): ICD-10-CM

## 2017-07-17 PROCEDURE — G0438 PPPS, INITIAL VISIT: HCPCS

## 2017-07-17 PROCEDURE — 99396 PREV VISIT EST AGE 40-64: CPT

## 2017-07-17 RX ORDER — LOSARTAN POTASSIUM AND HYDROCHLOROTHIAZIDE 12.5; 5 MG/1; MG/1
1 TABLET ORAL DAILY
Qty: 90 TABLET | Refills: 0 | Status: SHIPPED | OUTPATIENT
Start: 2017-07-17 | End: 2018-01-25

## 2017-07-17 RX ORDER — FENOFIBRATE 120 MG/1
1 TABLET ORAL DAILY
Qty: 90 TABLET | Refills: 1 | Status: SHIPPED | OUTPATIENT
Start: 2017-07-17 | End: 2017-10-18

## 2017-07-17 RX ORDER — METFORMIN HYDROCHLORIDE 1000 MG/1
1000 TABLET, FILM COATED, EXTENDED RELEASE ORAL
Qty: 90 TABLET | Refills: 0 | Status: SHIPPED | OUTPATIENT
Start: 2017-07-17 | End: 2017-10-18

## 2017-07-17 RX ORDER — LOSARTAN POTASSIUM AND HYDROCHLOROTHIAZIDE 12.5; 5 MG/1; MG/1
1 TABLET ORAL DAILY
Refills: 0 | COMMUNITY
Start: 2017-06-17 | End: 2017-07-17

## 2017-07-18 ENCOUNTER — OFFICE VISIT (OUTPATIENT)
Dept: HEMATOLOGY/ONCOLOGY | Facility: HOSPITAL | Age: 62
End: 2017-07-18
Attending: INTERNAL MEDICINE
Payer: COMMERCIAL

## 2017-07-18 VITALS
HEART RATE: 61 BPM | SYSTOLIC BLOOD PRESSURE: 135 MMHG | HEIGHT: 70 IN | BODY MASS INDEX: 27.49 KG/M2 | WEIGHT: 192 LBS | DIASTOLIC BLOOD PRESSURE: 83 MMHG | RESPIRATION RATE: 16 BRPM | TEMPERATURE: 98 F

## 2017-07-18 DIAGNOSIS — Z09 CHEMOTHERAPY FOLLOW-UP EXAMINATION: ICD-10-CM

## 2017-07-18 DIAGNOSIS — C85.99 LYMPHOMA OF TESTIS (HCC): ICD-10-CM

## 2017-07-18 DIAGNOSIS — R51.9 FREQUENT HEADACHES: ICD-10-CM

## 2017-07-18 DIAGNOSIS — C83.30 DLBCL (DIFFUSE LARGE B CELL LYMPHOMA) (HCC): Primary | ICD-10-CM

## 2017-07-18 PROBLEM — Z13.31 DEPRESSION SCREENING: Status: ACTIVE | Noted: 2017-07-18

## 2017-07-18 PROBLEM — Z00.01 ENCOUNTER FOR ROUTINE ADULT HEALTH EXAMINATION WITH ABNORMAL FINDINGS: Status: ACTIVE | Noted: 2017-07-18

## 2017-07-18 PROBLEM — Z87.891 FORMER SMOKER: Status: ACTIVE | Noted: 2017-07-18

## 2017-07-18 PROBLEM — G97.1 HEADACHE FOLLOWING LUMBAR PUNCTURE: Status: RESOLVED | Noted: 2017-06-14 | Resolved: 2017-07-18

## 2017-07-18 PROBLEM — E66.3 OVERWEIGHT: Status: ACTIVE | Noted: 2017-07-18

## 2017-07-18 PROBLEM — E78.1 PURE HYPERGLYCERIDEMIA: Status: ACTIVE | Noted: 2017-07-18

## 2017-07-18 PROCEDURE — 99215 OFFICE O/P EST HI 40 MIN: CPT | Performed by: INTERNAL MEDICINE

## 2017-07-18 PROCEDURE — 99212 OFFICE O/P EST SF 10 MIN: CPT | Performed by: INTERNAL MEDICINE

## 2017-07-18 NOTE — PATIENT INSTRUCTIONS
Pautas de prevención para hombres de 48 a 59 años de [address] [facility] de detección y las vacunas son importantes para el manejo de muro imelda.  La consejería sobre muro imelda también es esencial. A continuación, verá pautas en relación con esos temas para ho Cáncer de pulmón Adultos entre 54 y [de-identified] años que hayan fumado Detección anual para los que tienen antecedentes de fumar 27 paquetes por año o que dejaron de fumar en los últimos 13 años   Obesidad Todos los hombres de concepcion margie de edad En los exámenes de [place] Andriette Ahumada y rubéola (“MMR” por ruba siglas en Lists of hospitals in the [place]) Todos los hombres de concepcion margie de edad hasta alrededor de los 61 años que no tienen registro de alec tenido estas infecciones o haberse aplicado estas vacunas [address] dosis.  Consulte a muro pr © 5413-1981 [address], [address] [place]. All rights reserved. This information is not intended as a substitute for professional medical care. Always follow your healthcare professional's instructions.

## 2017-07-18 NOTE — PROGRESS NOTES
MIK Marquez is a 63year old male here for follow up of Dlbcl (diffuse large b cell lymphoma) (hcc)  (primary encounter diagnosis)  Chemotherapy follow-up examination  Lymphoma of testis (hcc)       Pt here today for follow up s/p Cycle heat intolerance. Genitourinary: Negative for dysuria, frequency, hematuria and urgency. Musculoskeletal: Positive for gait problem. Negative for arthralgias, back pain, myalgias and neck pain. Skin: Negative. Negative for color change and rash.    A None on file     Family History   Problem Relation Age of Onset   • Heart Disease Mother    • Heart Disease Father    • Breast Cancer Sister 36         PHYSICAL EXAM:    /83 (BP Location: Left arm, Patient Position: Sitting, Cuff Size: adult)   Pul present. Left cervical: No superficial cervical, no deep cervical and no posterior cervical adenopathy present. He has no axillary adenopathy. Right axillary: No pectoral and no lateral adenopathy present.         Left axillary: No pectoral improve dietary compliance and continue current medications. Information for the [facility] and programs to support the above recommendations was provided to the patient. No orders of the defined types were placed in this encounter.       Res

## 2017-07-18 NOTE — PROGRESS NOTES
CC: Annual Physical Exam    HPI:   Connor Acosta is a 63year old male who presents for a complete physical exam. Pt denies any complaints at this time.     Wt Readings from Last 6 Encounters:  07/17/17 : 193 lb  05/24/17 : 193 lb  05/16/17 : 193 lb  0 Negative mg/dL   Glucose Urine Negative Negative mg/dL   Ketones Urine Negative Negative mg/dL   Bilirubin Urine Negative Negative   Blood Urine Negative Negative   Nitrite Urine Negative Negative   Urobilinogen Urine <2.0 <2.0   Leukocyte Esterase Urine N SYSTEMS:   CONSTITUTIONAL:  Denies unusual weight gain/loss, fever, chills, or fatigue. EENT:  Eyes:  Denies eye pain, visual loss, blurred vision, double vision or yellow sclerae.  Ears, Nose, Throat:  Denies hearing loss, sneezing, congestion, runny nose Mouth:  No oral lesions or ulcerations, good dentition. NECK: Supple, no CLAD, no JVD, no carotid bruit, no thyromegaly. SKIN: No rashes, no skin lesion, no bruising, good turgor. HEART:  Regular rate and rhythm, no murmurs, rubs or gallops.   LUNGS: Fidel A1c prior to next scheduled appointment. - Diabetic foot exam performed during today's visit. 3. Essential hypertension with goal blood pressure less than 140/90  - Pt advised to decrease amount of salt intake in his diet.   - Will continue BP monitorin

## 2017-07-26 ENCOUNTER — HOSPITAL ENCOUNTER (OUTPATIENT)
Dept: MRI IMAGING | Facility: HOSPITAL | Age: 62
Discharge: HOME | End: 2017-07-26
Attending: INTERNAL MEDICINE
Payer: COMMERCIAL

## 2017-07-26 ENCOUNTER — NURSE ONLY (OUTPATIENT)
Dept: HEMATOLOGY/ONCOLOGY | Facility: HOSPITAL | Age: 62
End: 2017-07-26
Attending: INTERNAL MEDICINE
Payer: COMMERCIAL

## 2017-07-26 DIAGNOSIS — R51.9 FREQUENT HEADACHES: ICD-10-CM

## 2017-07-26 DIAGNOSIS — Z45.2 ENCOUNTER FOR ADJUSTMENT OR MANAGEMENT OF VASCULAR ACCESS DEVICE: ICD-10-CM

## 2017-07-26 DIAGNOSIS — C83.30 DLBCL (DIFFUSE LARGE B CELL LYMPHOMA) (HCC): Primary | ICD-10-CM

## 2017-07-26 DIAGNOSIS — C83.30 DLBCL (DIFFUSE LARGE B CELL LYMPHOMA) (HCC): ICD-10-CM

## 2017-07-26 PROCEDURE — A9575 INJ GADOTERATE MEGLUMI 0.1ML: HCPCS | Performed by: INTERNAL MEDICINE

## 2017-07-26 PROCEDURE — 70553 MRI BRAIN STEM W/O & W/DYE: CPT | Performed by: INTERNAL MEDICINE

## 2017-07-26 PROCEDURE — 96523 IRRIG DRUG DELIVERY DEVICE: CPT

## 2017-07-26 RX ORDER — 0.9 % SODIUM CHLORIDE 0.9 %
VIAL (ML) INJECTION
Status: DISCONTINUED
Start: 2017-07-26 | End: 2017-07-26

## 2017-07-26 RX ORDER — HEPARIN SODIUM (PORCINE) LOCK FLUSH IV SOLN 100 UNIT/ML 100 UNIT/ML
5 SOLUTION INTRAVENOUS ONCE
Status: CANCELLED | OUTPATIENT
Start: 2017-07-26

## 2017-07-26 RX ORDER — HEPARIN SODIUM (PORCINE) LOCK FLUSH IV SOLN 100 UNIT/ML 100 UNIT/ML
SOLUTION INTRAVENOUS
Status: COMPLETED
Start: 2017-07-26 | End: 2017-07-26

## 2017-07-26 RX ORDER — HEPARIN SODIUM (PORCINE) LOCK FLUSH IV SOLN 100 UNIT/ML 100 UNIT/ML
5 SOLUTION INTRAVENOUS ONCE
Status: COMPLETED | OUTPATIENT
Start: 2017-07-26 | End: 2017-07-26

## 2017-07-26 RX ORDER — 0.9 % SODIUM CHLORIDE 0.9 %
10 VIAL (ML) INJECTION ONCE
Status: CANCELLED | OUTPATIENT
Start: 2017-07-26

## 2017-07-26 RX ADMIN — HEPARIN SODIUM (PORCINE) LOCK FLUSH IV SOLN 100 UNIT/ML 500 UNITS: 100 SOLUTION INTRAVENOUS at 15:34:00

## 2017-07-26 NOTE — PROGRESS NOTES
Pt brought back to lab ambulating without assistance. pt had IT chemo about a month ago and is still experiencing headaches. MD is aware.   He is going for a MRI today at 645 pm.  Port accessed using sterile technique without complication, no blood return

## 2017-07-27 ENCOUNTER — TELEPHONE (OUTPATIENT)
Dept: HEMATOLOGY/ONCOLOGY | Facility: HOSPITAL | Age: 62
End: 2017-07-27

## 2017-07-27 RX ORDER — METHYLPREDNISOLONE 4 MG/1
TABLET ORAL
Qty: 1 PACKAGE | Refills: 0 | Status: SHIPPED | OUTPATIENT
Start: 2017-07-27 | End: 2017-09-01 | Stop reason: ALTCHOICE

## 2017-07-27 NOTE — PROGRESS NOTES
Pt had MRI of brain yesterday with report of noted inflammation. Pt is still c/o headaches. Has not been taking Tylenol, ok to do so. Will add medrol dose pack as reviewed with Dr Javon Medina.

## 2017-07-27 NOTE — TELEPHONE ENCOUNTER
Pt called and left message that [facility] APN ordering a medrol dose pack to help with headache. Instructed that pt should take as directed on package. Instructed to start tomorrow morning and to take with food.  Pt to call if had any further questions

## 2017-07-27 NOTE — TELEPHONE ENCOUNTER
Pt returned call and states \" Still have a headache at pain level 2 and sometimes a 5.\" Pt denies taking pain meds. Discussed possible blood patch. \" I am not sure I want to do that yet. \" Instructed pt to take Tylenol and continue to hydrate and drink

## 2017-07-27 NOTE — TELEPHONE ENCOUNTER
Pt called per Lencho LANIER request. Left message that MRI showed a small subdural hematoma/ changes post lumbar puncture of MTX.  Instructed to call if headaches continue and not subsiding [phone] to discuss possible options/treatments to help he

## 2017-07-31 ENCOUNTER — TELEPHONE (OUTPATIENT)
Dept: HEMATOLOGY/ONCOLOGY | Facility: HOSPITAL | Age: 62
End: 2017-07-31

## 2017-07-31 NOTE — TELEPHONE ENCOUNTER
Pt called and left message- calling to F/U post starting medrol dose pack to see if headaches improved. Instructed to call if continues with headaches [phone].

## 2017-08-07 ENCOUNTER — TELEPHONE (OUTPATIENT)
Dept: HEMATOLOGY/ONCOLOGY | Facility: HOSPITAL | Age: 62
End: 2017-08-07

## 2017-08-07 PROBLEM — G97.1 HEADACHE, POST-LUMBAR PUNCTURE: Status: ACTIVE | Noted: 2017-08-07

## 2017-08-07 NOTE — TELEPHONE ENCOUNTER
Called regarding obtaining a referral for a blood patch/pain clinic due to headaches post lumbar puncture. Per Jose Castanon will work on referral as an urgent case.

## 2017-08-07 NOTE — TELEPHONE ENCOUNTER
Wilderben Wilks called and said he has been having this bad headache, he has been taking medication( which he does not remember the name of ) but it has not helped improved his headache. He was told to call back if this did not improve.  Please advise

## 2017-08-07 NOTE — TELEPHONE ENCOUNTER
Pt called and left message that I spoke with the pain clinic/anesthesia and they will call pt to set up an appointment for a blood patch to help with headaches. They will work on authorization from insurance and set up appointment.  Instructed to call if ha

## 2017-08-07 NOTE — TELEPHONE ENCOUNTER
I attempted to return Mickey's call. No answer. I left a  msg asking him to please call the office so I may complete a phone assessment.

## 2017-08-08 DIAGNOSIS — G97.1 SPINAL HEADACHE: ICD-10-CM

## 2017-08-08 DIAGNOSIS — G97.1 REACTION TO SPINAL OR LUMBAR PUNCTURE: Primary | ICD-10-CM

## 2017-08-11 ENCOUNTER — OFFICE VISIT (OUTPATIENT)
Dept: PAIN CLINIC | Facility: HOSPITAL | Age: 62
End: 2017-08-11
Payer: COMMERCIAL

## 2017-08-11 VITALS
HEART RATE: 80 BPM | WEIGHT: 192 LBS | BODY MASS INDEX: 27.49 KG/M2 | HEIGHT: 70 IN | DIASTOLIC BLOOD PRESSURE: 78 MMHG | SYSTOLIC BLOOD PRESSURE: 118 MMHG

## 2017-08-11 DIAGNOSIS — G97.1 HEADACHE, POST-LUMBAR PUNCTURE: ICD-10-CM

## 2017-08-11 DIAGNOSIS — R51.9 FREQUENT HEADACHES: Primary | ICD-10-CM

## 2017-08-11 PROCEDURE — 99201 HC OUTPT EVAL AND MGNT NEW PT LEVEL 1: CPT

## 2017-08-11 RX ORDER — METHYLPREDNISOLONE 4 MG/1
4 TABLET ORAL ONCE
Qty: 1 PACKAGE | Refills: 0 | Status: SHIPPED | OUTPATIENT
Start: 2017-08-11 | End: 2017-08-11

## 2017-08-11 NOTE — PROGRESS NOTES
8/11/17- New patient to [facility] for evaluation of headaches which began a couple of months ago after receiving Intrathecal chemotherapy. Referred by , reports pain is caused when he bends/leans forward. Seen by Kye Bobby, see provider note.

## 2017-08-11 NOTE — CHRONIC PAIN
[facility]  Pain Consultation     HISTORY OF PRESENT ILLNESS:  Vega Lindsay is a 63year old old male referred to the pain clinic by Dr. Gomez cEhols for Frequent headaches  (primary encounter diagnosis)  Headache, post-lumbar puncture the pa Gastrointestinal:  No active ulcer, no change in B/B habits  Genitourinary:  Negative, no changes  Integumentary :  Negative  Psychiatric:  Negative  Hematologic: No active bleeding  Lymphatic: No current lymphedema  Allergic/Immunologic:  Negative  Musc Narrative   None on file       ADVANCE CARE PLANNING:  Advance Care Plan NOT discussed.     PHYSICAL EXAMINATION:   08/11/17  1052   BP: 118/78   Pulse: 80   Weight: 192 lb (87.1 kg)   Height: 5' 10\" (1.778 m)     General: Alert and oriented x3  Affect:  N infarction, or inappropriate atrophy. BRAINSTEM:              No edema, hemorrhage, mass, acute infarction, or inappropriate atrophy. CSF SPACES:             Ventricles, cisterns, and sulci are appropriate for age. No hydrocephalus or midline shift. MCHC 35.2 06/24/2017   RDW 13.1 06/24/2017    06/24/2017   MPV 9.6 06/24/2017       Lab Results  Component Value Date    06/24/2017   K 3.7 06/24/2017    06/24/2017   CO2 25 06/24/2017   BUN 11 06/24/2017    (H) 06/24/2017   CA

## 2017-08-17 ENCOUNTER — TELEPHONE (OUTPATIENT)
Dept: FAMILY MEDICINE CLINIC | Facility: CLINIC | Age: 62
End: 2017-08-17

## 2017-08-28 ENCOUNTER — TELEPHONE (OUTPATIENT)
Dept: HEMATOLOGY/ONCOLOGY | Facility: HOSPITAL | Age: 62
End: 2017-08-28

## 2017-08-28 NOTE — TELEPHONE ENCOUNTER
Follow up phone call to check on pt regarding having headaches post lumbar puncture IT chemotherapy. Left message to call Dr. Priyank Atkinson office if continuing to have headaches [phone].

## 2017-08-29 ENCOUNTER — TELEPHONE (OUTPATIENT)
Dept: HEMATOLOGY/ONCOLOGY | Facility: HOSPITAL | Age: 62
End: 2017-08-29

## 2017-08-29 ENCOUNTER — TELEPHONE (OUTPATIENT)
Dept: FAMILY MEDICINE CLINIC | Facility: CLINIC | Age: 62
End: 2017-08-29

## 2017-08-29 DIAGNOSIS — R51.9 HEADACHE DISORDER: Primary | ICD-10-CM

## 2017-08-29 NOTE — TELEPHONE ENCOUNTER
Ok per Dr Omi Bray to have patient see neurologist, referral entered and got approved.   Dr Ruff Spine office will contact patient regarding appointment and referral.

## 2017-08-29 NOTE — TELEPHONE ENCOUNTER
Returned call and pt states \" I am still having headaches when I pick something up heavy. The pain is in the right front of my head and a pain level of 6. The pain goes away but I am still having headaches. \" Informed pt that Dr. Scottie Whitney would like him to see

## 2017-08-29 NOTE — TELEPHONE ENCOUNTER
Danilo Phillips states patient needs to see a neurologist. They have made an appointment for him with  on Friday 9/01/2017 at 9 AM. Calling to get a referral before friday with the neurologist. Please fax referral once it has been authorized phone numb

## 2017-08-29 NOTE — TELEPHONE ENCOUNTER
Pt called and left message that referral completed per Dr. Shelton [facility] office to see Dr. Saintclair Saltness. Appointment scheduled with neurologist 9/1/17 at 9:10am at the [facility] 3rd floor [address] [facility].  Instructed to call [phone] if needs to change appoint

## 2017-08-30 ENCOUNTER — TELEPHONE (OUTPATIENT)
Dept: NEUROLOGY | Facility: CLINIC | Age: 62
End: 2017-08-30

## 2017-09-01 ENCOUNTER — OFFICE VISIT (OUTPATIENT)
Dept: NEUROLOGY | Facility: CLINIC | Age: 62
End: 2017-09-01

## 2017-09-01 VITALS
HEART RATE: 60 BPM | BODY MASS INDEX: 34.02 KG/M2 | SYSTOLIC BLOOD PRESSURE: 118 MMHG | DIASTOLIC BLOOD PRESSURE: 78 MMHG | WEIGHT: 192 LBS | RESPIRATION RATE: 16 BRPM | HEIGHT: 63 IN

## 2017-09-01 DIAGNOSIS — R90.89 MRI OF BRAIN ABNORMAL: ICD-10-CM

## 2017-09-01 DIAGNOSIS — G96.810 INTRACRANIAL HYPOTENSION: Primary | ICD-10-CM

## 2017-09-01 PROCEDURE — 99204 OFFICE O/P NEW MOD 45 MIN: CPT | Performed by: OTHER

## 2017-09-01 NOTE — PROGRESS NOTES
Neurology Initial Visit     Referred By: Dr. Laura Sosa    Chief Complaint: Patient presents with:  Headache: Patient presents today c/o headaches that started after he had lumbar puncture for chemotherapy (once a week for 4 weeks) about 2 months ago.  Patient about 1/2 gallon of water daily and 2 cups of coffee daily and was declining to drink more water or coffee.      Past Medical History:   Diagnosis Date   • Diabetes ([facility]    • Diabetes mellitus ([facility] 2014   • DLBCL (diffuse large B cell lymphoma) ([facility]    • including: comprehension, naming, repetition, vocabulary    Cranial Nerves:  II.- Visual fields full to confrontation        Fundoscopically- No papilledema or retinal hemorrhages. Normal optic discs, sharp edges. III, IV, VI- EOM intact, DANITA  V.  Facial however will do CT head to make sure there is resolution of that possible small subdural hematoma.   In the meantime we discussed with the patient that sincehe is already somewhat improving he can wait it out until leak seals, I suggested to try to increase

## 2017-09-05 ENCOUNTER — TELEPHONE (OUTPATIENT)
Dept: NEUROLOGY | Facility: CLINIC | Age: 62
End: 2017-09-05

## 2017-09-05 ENCOUNTER — TELEPHONE (OUTPATIENT)
Dept: FAMILY MEDICINE CLINIC | Facility: CLINIC | Age: 62
End: 2017-09-05

## 2017-09-05 NOTE — TELEPHONE ENCOUNTER
Left message for patient due for repeat A1C. Orders are placed in the system. Patient may go to [facility] to get test done as soon as possible.

## 2017-09-05 NOTE — TELEPHONE ENCOUNTER
Received in basket from [email or web address]  advising of approval for blood patch for one unit/DOS. Will inform Nursing.

## 2017-09-11 ENCOUNTER — APPOINTMENT (OUTPATIENT)
Dept: LAB | Facility: HOSPITAL | Age: 62
End: 2017-09-11
Payer: COMMERCIAL

## 2017-09-18 ENCOUNTER — HOSPITAL ENCOUNTER (OUTPATIENT)
Dept: CT IMAGING | Facility: HOSPITAL | Age: 62
Discharge: HOME | End: 2017-09-18
Attending: INTERNAL MEDICINE
Payer: COMMERCIAL

## 2017-09-18 DIAGNOSIS — C83.30 DLBCL (DIFFUSE LARGE B CELL LYMPHOMA) (HCC): ICD-10-CM

## 2017-09-18 LAB — CREAT BLD-MCNC: 0.7 MG/DL (ref 0.5–1.5)

## 2017-09-18 PROCEDURE — 71260 CT THORAX DX C+: CPT | Performed by: INTERNAL MEDICINE

## 2017-09-18 PROCEDURE — 74177 CT ABD & PELVIS W/CONTRAST: CPT | Performed by: INTERNAL MEDICINE

## 2017-09-18 PROCEDURE — 82565 ASSAY OF CREATININE: CPT

## 2017-09-20 RX ORDER — LOSARTAN POTASSIUM AND HYDROCHLOROTHIAZIDE 12.5; 5 MG/1; MG/1
TABLET ORAL
Qty: 90 TABLET | Refills: 0 | Status: SHIPPED | OUTPATIENT
Start: 2017-09-20 | End: 2017-09-26

## 2017-09-26 ENCOUNTER — OFFICE VISIT (OUTPATIENT)
Dept: HEMATOLOGY/ONCOLOGY | Facility: HOSPITAL | Age: 62
End: 2017-09-26
Attending: INTERNAL MEDICINE
Payer: COMMERCIAL

## 2017-09-26 VITALS
TEMPERATURE: 98 F | HEIGHT: 70 IN | BODY MASS INDEX: 27.2 KG/M2 | WEIGHT: 190 LBS | SYSTOLIC BLOOD PRESSURE: 133 MMHG | DIASTOLIC BLOOD PRESSURE: 81 MMHG | RESPIRATION RATE: 16 BRPM | HEART RATE: 60 BPM

## 2017-09-26 DIAGNOSIS — Z45.2 ENCOUNTER FOR ADJUSTMENT OR MANAGEMENT OF VASCULAR ACCESS DEVICE: ICD-10-CM

## 2017-09-26 DIAGNOSIS — Z95.828 PORTACATH IN PLACE: ICD-10-CM

## 2017-09-26 DIAGNOSIS — C85.99 LYMPHOMA OF TESTIS (HCC): ICD-10-CM

## 2017-09-26 DIAGNOSIS — G97.1 HEADACHE FOLLOWING LUMBAR PUNCTURE: ICD-10-CM

## 2017-09-26 DIAGNOSIS — C83.30 DLBCL (DIFFUSE LARGE B CELL LYMPHOMA) (HCC): Primary | ICD-10-CM

## 2017-09-26 DIAGNOSIS — R51.9 FREQUENT HEADACHES: ICD-10-CM

## 2017-09-26 LAB
ALBUMIN SERPL BCP-MCNC: 4.3 G/DL (ref 3.5–4.8)
ALBUMIN/GLOB SERPL: 2 {RATIO} (ref 1–2)
ALP SERPL-CCNC: 62 U/L (ref 32–100)
ALT SERPL-CCNC: 48 U/L (ref 17–63)
ANION GAP SERPL CALC-SCNC: 9 MMOL/L (ref 0–18)
AST SERPL-CCNC: 33 U/L (ref 15–41)
BASOPHILS # BLD: 0.1 K/UL (ref 0–0.2)
BASOPHILS NFR BLD: 1 %
BILIRUB SERPL-MCNC: 0.4 MG/DL (ref 0.3–1.2)
BUN SERPL-MCNC: 13 MG/DL (ref 8–20)
BUN/CREAT SERPL: 17.8 (ref 10–20)
CALCIUM SERPL-MCNC: 9.1 MG/DL (ref 8.5–10.5)
CHLORIDE SERPL-SCNC: 102 MMOL/L (ref 95–110)
CO2 SERPL-SCNC: 25 MMOL/L (ref 22–32)
CREAT SERPL-MCNC: 0.73 MG/DL (ref 0.5–1.5)
EOSINOPHIL # BLD: 0.1 K/UL (ref 0–0.7)
EOSINOPHIL NFR BLD: 2 %
ERYTHROCYTE [DISTWIDTH] IN BLOOD BY AUTOMATED COUNT: 13.7 % (ref 11–15)
GLOBULIN PLAS-MCNC: 2.2 G/DL (ref 2.5–3.7)
GLUCOSE SERPL-MCNC: 244 MG/DL (ref 70–99)
HCT VFR BLD AUTO: 36.7 % (ref 41–52)
HGB BLD-MCNC: 12.9 G/DL (ref 13.5–17.5)
LDH SERPL L TO P-CCNC: 153 U/L (ref 98–192)
LYMPHOCYTES # BLD: 1.2 K/UL (ref 1–4)
LYMPHOCYTES NFR BLD: 29 %
MCH RBC QN AUTO: 33.9 PG (ref 27–32)
MCHC RBC AUTO-ENTMCNC: 35.2 G/DL (ref 32–37)
MCV RBC AUTO: 96.5 FL (ref 80–100)
MONOCYTES # BLD: 0.3 K/UL (ref 0–1)
MONOCYTES NFR BLD: 8 %
NEUTROPHILS # BLD AUTO: 2.5 K/UL (ref 1.8–7.7)
NEUTROPHILS NFR BLD: 59 %
OSMOLALITY UR CALC.SUM OF ELEC: 290 MOSM/KG (ref 275–295)
PLATELET # BLD AUTO: 168 K/UL (ref 140–400)
PMV BLD AUTO: 9.4 FL (ref 7.4–10.3)
POTASSIUM SERPL-SCNC: 3.4 MMOL/L (ref 3.3–5.1)
PROT SERPL-MCNC: 6.5 G/DL (ref 5.9–8.4)
RBC # BLD AUTO: 3.81 M/UL (ref 4.5–5.9)
SODIUM SERPL-SCNC: 136 MMOL/L (ref 136–144)
WBC # BLD AUTO: 4.2 K/UL (ref 4–11)

## 2017-09-26 PROCEDURE — 99213 OFFICE O/P EST LOW 20 MIN: CPT | Performed by: INTERNAL MEDICINE

## 2017-09-26 PROCEDURE — 99212 OFFICE O/P EST SF 10 MIN: CPT | Performed by: INTERNAL MEDICINE

## 2017-09-26 PROCEDURE — 96523 IRRIG DRUG DELIVERY DEVICE: CPT

## 2017-09-26 PROCEDURE — 80053 COMPREHEN METABOLIC PANEL: CPT

## 2017-09-26 PROCEDURE — 83615 LACTATE (LD) (LDH) ENZYME: CPT

## 2017-09-26 PROCEDURE — 85025 COMPLETE CBC W/AUTO DIFF WBC: CPT

## 2017-09-26 PROCEDURE — 36415 COLL VENOUS BLD VENIPUNCTURE: CPT

## 2017-09-26 RX ORDER — 0.9 % SODIUM CHLORIDE 0.9 %
VIAL (ML) INJECTION
Status: DISCONTINUED
Start: 2017-09-26 | End: 2017-09-26

## 2017-09-26 RX ORDER — HEPARIN SODIUM (PORCINE) LOCK FLUSH IV SOLN 100 UNIT/ML 100 UNIT/ML
5 SOLUTION INTRAVENOUS ONCE
Status: CANCELLED | OUTPATIENT
Start: 2017-09-26

## 2017-09-26 RX ORDER — HEPARIN SODIUM (PORCINE) LOCK FLUSH IV SOLN 100 UNIT/ML 100 UNIT/ML
5 SOLUTION INTRAVENOUS ONCE
Status: DISCONTINUED | OUTPATIENT
Start: 2017-09-26 | End: 2017-09-26

## 2017-09-26 RX ORDER — 0.9 % SODIUM CHLORIDE 0.9 %
10 VIAL (ML) INJECTION ONCE
Status: CANCELLED | OUTPATIENT
Start: 2017-09-26

## 2017-09-26 RX ORDER — HEPARIN SODIUM (PORCINE) LOCK FLUSH IV SOLN 100 UNIT/ML 100 UNIT/ML
SOLUTION INTRAVENOUS
Status: DISCONTINUED
Start: 2017-09-26 | End: 2017-09-26

## 2017-09-26 NOTE — PROGRESS NOTES
Pt here for port flush. Noticed that pt has quarterly cbc,cmp, LDH - last drawn in June; contacted Dr Ira Langley office, spoke with John Yousif RN - ok to draw labs - pt is seeing Dr Bushra Clement today. Pt works on a loading dock. He denies new complaints/concerns.

## 2017-09-26 NOTE — PROGRESS NOTES
HPI       Oz Notice is a 63year old male here for follow up of Dlbcl (diffuse large b cell lymphoma) (hcc)  (primary encounter diagnosis)  Frequent headaches  Lymphoma of testis (hcc)       Pt here today for follow up s/p Cycle 6 RCHOP/ RT comp for dysuria, frequency, hematuria and urgency. Musculoskeletal: Positive for gait problem (balance off at times). Negative for arthralgias, back pain, myalgias and neck pain. Skin: Negative. Negative for color change and rash.    Allergic/Immunologic: History Narrative   None on file     Family History   Problem Relation Age of Onset   • Heart Disease Mother    • Heart Disease Father    • Breast Cancer Sister 36         PHYSICAL EXAM:    /81 (BP Location: Left arm, Patient Position: Sitting, Cuff adenopathy present. Left cervical: No superficial cervical, no deep cervical and no posterior cervical adenopathy present. He has no axillary adenopathy. Right axillary: No pectoral and no lateral adenopathy present.         Left axillary: mmol/L   BUN 13 8 - 20 mg/dL   Creatinine 0.73 0.50 - 1.50 mg/dL   Calcium, Total 9.1 8.5 - 10.5 mg/dL   ALT 48 17 - 63 U/L   AST 33 15 - 41 U/L   Alkaline Phosphatase 62 32 - 100 U/L   Bilirubin, Total 0.4 0.3 - 1.2 mg/dL   Total Protein 6.5 5.9 - 8.4 g/d chemotherapy 3 months previously. TECHNIQUE:    Multidetector CT images of the chest, abdomen and pelvis were obtained with intravenous contrast material. Automated exposure control for dose reduction was used.  Adjustment of the mA and/or kV was done b fatty sparing. In the right hepatic dome, there   is reidentification of 0.7 x 0.9 x 0.9 cm probable flash filling hemangioma. BILIARY:            The gallbladder is present. PANCREAS:      No lesion, fluid collection, ductal dilatation, or atrophy.     Ledora Asa Multilevel degenerative changes are evident. ABDOMINAL WALL:      There is a minute fat-containing umbilical hernia. Postoperative changes in the right inguinal region are demonstrated. There is a small fat containing left inguinal hernia.   OTHER:

## 2017-10-10 ENCOUNTER — OFFICE VISIT (OUTPATIENT)
Dept: FAMILY MEDICINE CLINIC | Facility: CLINIC | Age: 62
End: 2017-10-10

## 2017-10-10 VITALS
WEIGHT: 190 LBS | BODY MASS INDEX: 27 KG/M2 | HEART RATE: 68 BPM | DIASTOLIC BLOOD PRESSURE: 80 MMHG | SYSTOLIC BLOOD PRESSURE: 136 MMHG | OXYGEN SATURATION: 98 %

## 2017-10-10 DIAGNOSIS — N48.1 BALANITIS: Primary | ICD-10-CM

## 2017-10-10 PROCEDURE — 99213 OFFICE O/P EST LOW 20 MIN: CPT

## 2017-10-10 RX ORDER — CLOTRIMAZOLE AND BETAMETHASONE DIPROPIONATE 10; .64 MG/G; MG/G
1 CREAM TOPICAL 2 TIMES DAILY
Qty: 45 G | Refills: 0 | Status: SHIPPED | OUTPATIENT
Start: 2017-10-10 | End: 2018-01-25 | Stop reason: ALTCHOICE

## 2017-10-11 ENCOUNTER — NURSE ONLY (OUTPATIENT)
Dept: HEMATOLOGY/ONCOLOGY | Facility: HOSPITAL | Age: 62
End: 2017-10-11
Attending: INTERNAL MEDICINE
Payer: COMMERCIAL

## 2017-10-11 DIAGNOSIS — Z45.2 ENCOUNTER FOR ADJUSTMENT OR MANAGEMENT OF VASCULAR ACCESS DEVICE: ICD-10-CM

## 2017-10-11 DIAGNOSIS — C83.30 DLBCL (DIFFUSE LARGE B CELL LYMPHOMA) (HCC): Primary | ICD-10-CM

## 2017-10-11 PROCEDURE — A4216 STERILE WATER/SALINE, 10 ML: HCPCS

## 2017-10-11 PROCEDURE — 36593 DECLOT VASCULAR DEVICE: CPT

## 2017-10-11 PROCEDURE — 96523 IRRIG DRUG DELIVERY DEVICE: CPT

## 2017-10-11 RX ORDER — 0.9 % SODIUM CHLORIDE 0.9 %
10 VIAL (ML) INJECTION ONCE
Status: CANCELLED | OUTPATIENT
Start: 2017-10-11

## 2017-10-11 RX ORDER — HEPARIN SODIUM (PORCINE) LOCK FLUSH IV SOLN 100 UNIT/ML 100 UNIT/ML
5 SOLUTION INTRAVENOUS ONCE
Status: COMPLETED | OUTPATIENT
Start: 2017-10-11 | End: 2017-10-11

## 2017-10-11 RX ORDER — HEPARIN SODIUM (PORCINE) LOCK FLUSH IV SOLN 100 UNIT/ML 100 UNIT/ML
5 SOLUTION INTRAVENOUS ONCE
Status: CANCELLED | OUTPATIENT
Start: 2017-10-11

## 2017-10-11 RX ORDER — 0.9 % SODIUM CHLORIDE 0.9 %
VIAL (ML) INJECTION
Status: DISCONTINUED
Start: 2017-10-11 | End: 2017-10-11

## 2017-10-11 RX ORDER — HEPARIN SODIUM (PORCINE) LOCK FLUSH IV SOLN 100 UNIT/ML 100 UNIT/ML
SOLUTION INTRAVENOUS
Status: COMPLETED
Start: 2017-10-11 | End: 2017-10-11

## 2017-10-11 RX ADMIN — HEPARIN SODIUM (PORCINE) LOCK FLUSH IV SOLN 100 UNIT/ML 500 UNITS: 100 SOLUTION INTRAVENOUS at 16:22:00

## 2017-10-11 NOTE — PROGRESS NOTES
Bret Swan presents today to lab. Port accessed using sterile technique with no blood return noted. Port flushes easily, Bret Swan denies complaints of pain/burning of port site with.  Mickey repositioned, asked to deep breath and cough, reclined, with a total

## 2017-10-11 NOTE — PROGRESS NOTES
HPI:    Patient ID: Kina Burkett is a 63year old male. Penis/Scrotum Problem   The patient's primary symptoms include genital itching and penile pain.  The patient's pertinent negatives include no penile discharge, scrotal swelling or testicular p Current Outpatient Prescriptions:  clotrimazole-betamethasone 1-0.05 % External Cream Apply 1 Application topically 2 (two) times daily. Disp: 45 g Rfl: 0   Losartan Potassium-HCTZ 50-12.5 MG Oral Tab Take 1 tablet by mouth daily.  Disp: 90 tablet Rfl:

## 2017-10-11 NOTE — PATIENT INSTRUCTIONS
Balanitis [Balanitis]    La Balanitis es akhil inflamación en la keesha del pene (glande) Puede ocurrir a partir de akhil acumulación de gérmenes (bacterias o virus) bajo el prepucio. Con mayor frecuencia se trata de akhil complicación de la diabetes.  Kerry Leone

## 2017-10-14 ENCOUNTER — APPOINTMENT (OUTPATIENT)
Dept: LAB | Facility: HOSPITAL | Age: 62
End: 2017-10-14
Payer: COMMERCIAL

## 2017-10-14 DIAGNOSIS — IMO0001 UNCONTROLLED TYPE 2 DIABETES MELLITUS WITHOUT COMPLICATION, WITHOUT LONG-TERM CURRENT USE OF INSULIN: ICD-10-CM

## 2017-10-14 DIAGNOSIS — E78.1 PURE HYPERGLYCERIDEMIA: ICD-10-CM

## 2017-10-14 PROCEDURE — 36415 COLL VENOUS BLD VENIPUNCTURE: CPT

## 2017-10-14 PROCEDURE — 83036 HEMOGLOBIN GLYCOSYLATED A1C: CPT

## 2017-10-14 PROCEDURE — 80053 COMPREHEN METABOLIC PANEL: CPT

## 2017-10-14 PROCEDURE — 80061 LIPID PANEL: CPT

## 2017-10-18 DIAGNOSIS — IMO0001 UNCONTROLLED TYPE 2 DIABETES MELLITUS WITHOUT COMPLICATION, WITHOUT LONG-TERM CURRENT USE OF INSULIN: ICD-10-CM

## 2017-10-18 DIAGNOSIS — E78.1 PURE HYPERGLYCERIDEMIA: ICD-10-CM

## 2017-10-18 RX ORDER — METFORMIN HYDROCHLORIDE 1000 MG/1
1000 TABLET, FILM COATED, EXTENDED RELEASE ORAL
Qty: 90 TABLET | Refills: 0 | Status: SHIPPED | OUTPATIENT
Start: 2017-10-18 | End: 2018-01-25

## 2017-10-18 RX ORDER — PIOGLITAZONEHYDROCHLORIDE 15 MG/1
15 TABLET ORAL DAILY
Qty: 90 TABLET | Refills: 0 | Status: SHIPPED | OUTPATIENT
Start: 2017-10-18 | End: 2018-01-25

## 2017-10-18 RX ORDER — FENOFIBRATE 120 MG/1
1 TABLET ORAL DAILY
Qty: 90 TABLET | Refills: 1 | Status: SHIPPED | OUTPATIENT
Start: 2017-10-18 | End: 2018-01-26

## 2017-10-19 ENCOUNTER — TELEPHONE (OUTPATIENT)
Dept: FAMILY MEDICINE CLINIC | Facility: CLINIC | Age: 62
End: 2017-10-19

## 2017-10-19 NOTE — TELEPHONE ENCOUNTER
A message was left on his v/m to continue Metformin 1000 daily and that Dr Butch Chen is adding Actos 15 mg because his A1c was higher than before, he is also due for labs again in 3 months. Rx were sent by Dr Butch Chen already.

## 2017-11-20 ENCOUNTER — TELEPHONE (OUTPATIENT)
Dept: FAMILY MEDICINE CLINIC | Facility: CLINIC | Age: 62
End: 2017-11-20

## 2017-12-19 ENCOUNTER — APPOINTMENT (OUTPATIENT)
Dept: HEMATOLOGY/ONCOLOGY | Facility: HOSPITAL | Age: 62
End: 2017-12-19
Attending: INTERNAL MEDICINE
Payer: COMMERCIAL

## 2018-01-16 DIAGNOSIS — C83.30 DLBCL (DIFFUSE LARGE B CELL LYMPHOMA) (HCC): Primary | ICD-10-CM

## 2018-01-16 DIAGNOSIS — Z95.828 PORTACATH IN PLACE: ICD-10-CM

## 2018-01-17 ENCOUNTER — NURSE ONLY (OUTPATIENT)
Dept: HEMATOLOGY/ONCOLOGY | Facility: HOSPITAL | Age: 63
End: 2018-01-17
Attending: INTERNAL MEDICINE
Payer: COMMERCIAL

## 2018-01-17 ENCOUNTER — TELEPHONE (OUTPATIENT)
Dept: FAMILY MEDICINE CLINIC | Facility: CLINIC | Age: 63
End: 2018-01-17

## 2018-01-17 VITALS
HEIGHT: 70 IN | SYSTOLIC BLOOD PRESSURE: 130 MMHG | HEART RATE: 51 BPM | TEMPERATURE: 98 F | WEIGHT: 192 LBS | BODY MASS INDEX: 27.49 KG/M2 | RESPIRATION RATE: 16 BRPM | DIASTOLIC BLOOD PRESSURE: 83 MMHG

## 2018-01-17 DIAGNOSIS — G97.1 HEADACHE FOLLOWING LUMBAR PUNCTURE: ICD-10-CM

## 2018-01-17 DIAGNOSIS — C85.99 LYMPHOMA OF TESTIS (HCC): ICD-10-CM

## 2018-01-17 DIAGNOSIS — C83.30 DIFFUSE LARGE B-CELL LYMPHOMA, UNSPECIFIED BODY REGION (HCC): Primary | ICD-10-CM

## 2018-01-17 DIAGNOSIS — Z95.828 PORTACATH IN PLACE: Primary | ICD-10-CM

## 2018-01-17 DIAGNOSIS — C83.30 DLBCL (DIFFUSE LARGE B CELL LYMPHOMA) (HCC): ICD-10-CM

## 2018-01-17 LAB
ALBUMIN SERPL BCP-MCNC: 4.2 G/DL (ref 3.5–4.8)
ALBUMIN/GLOB SERPL: 1.6 {RATIO} (ref 1–2)
ALP SERPL-CCNC: 41 U/L (ref 32–100)
ALT SERPL-CCNC: 37 U/L (ref 17–63)
ANION GAP SERPL CALC-SCNC: 9 MMOL/L (ref 0–18)
AST SERPL-CCNC: 31 U/L (ref 15–41)
BASOPHILS # BLD: 0.1 K/UL (ref 0–0.2)
BASOPHILS NFR BLD: 1 %
BILIRUB SERPL-MCNC: 0.5 MG/DL (ref 0.3–1.2)
BUN SERPL-MCNC: 15 MG/DL (ref 8–20)
BUN/CREAT SERPL: 20.5 (ref 10–20)
CALCIUM SERPL-MCNC: 9.2 MG/DL (ref 8.5–10.5)
CHLORIDE SERPL-SCNC: 100 MMOL/L (ref 95–110)
CO2 SERPL-SCNC: 26 MMOL/L (ref 22–32)
CREAT SERPL-MCNC: 0.73 MG/DL (ref 0.5–1.5)
EOSINOPHIL # BLD: 0.1 K/UL (ref 0–0.7)
EOSINOPHIL NFR BLD: 2 %
ERYTHROCYTE [DISTWIDTH] IN BLOOD BY AUTOMATED COUNT: 14 % (ref 11–15)
GLOBULIN PLAS-MCNC: 2.6 G/DL (ref 2.5–3.7)
GLUCOSE SERPL-MCNC: 109 MG/DL (ref 70–99)
HCT VFR BLD AUTO: 36.9 % (ref 41–52)
HGB BLD-MCNC: 12.4 G/DL (ref 13.5–17.5)
LDH SERPL L TO P-CCNC: 142 U/L (ref 98–192)
LYMPHOCYTES # BLD: 2 K/UL (ref 1–4)
LYMPHOCYTES NFR BLD: 38 %
MCH RBC QN AUTO: 32.9 PG (ref 27–32)
MCHC RBC AUTO-ENTMCNC: 33.6 G/DL (ref 32–37)
MCV RBC AUTO: 98.2 FL (ref 80–100)
MONOCYTES # BLD: 0.4 K/UL (ref 0–1)
MONOCYTES NFR BLD: 8 %
NEUTROPHILS # BLD AUTO: 2.6 K/UL (ref 1.8–7.7)
NEUTROPHILS NFR BLD: 51 %
OSMOLALITY UR CALC.SUM OF ELEC: 281 MOSM/KG (ref 275–295)
PLATELET # BLD AUTO: 197 K/UL (ref 140–400)
PMV BLD AUTO: 9.4 FL (ref 7.4–10.3)
POTASSIUM SERPL-SCNC: 3.5 MMOL/L (ref 3.3–5.1)
PROT SERPL-MCNC: 6.8 G/DL (ref 5.9–8.4)
RBC # BLD AUTO: 3.76 M/UL (ref 4.5–5.9)
SODIUM SERPL-SCNC: 135 MMOL/L (ref 136–144)
WBC # BLD AUTO: 5.2 K/UL (ref 4–11)

## 2018-01-17 PROCEDURE — A4216 STERILE WATER/SALINE, 10 ML: HCPCS

## 2018-01-17 PROCEDURE — 85025 COMPLETE CBC W/AUTO DIFF WBC: CPT

## 2018-01-17 PROCEDURE — 36591 DRAW BLOOD OFF VENOUS DEVICE: CPT

## 2018-01-17 PROCEDURE — 83615 LACTATE (LD) (LDH) ENZYME: CPT

## 2018-01-17 PROCEDURE — 80053 COMPREHEN METABOLIC PANEL: CPT

## 2018-01-17 PROCEDURE — 99213 OFFICE O/P EST LOW 20 MIN: CPT | Performed by: INTERNAL MEDICINE

## 2018-01-17 PROCEDURE — 99212 OFFICE O/P EST SF 10 MIN: CPT | Performed by: INTERNAL MEDICINE

## 2018-01-17 RX ORDER — HEPARIN SODIUM (PORCINE) LOCK FLUSH IV SOLN 100 UNIT/ML 100 UNIT/ML
5 SOLUTION INTRAVENOUS ONCE
Status: CANCELLED | OUTPATIENT
Start: 2018-01-17

## 2018-01-17 RX ORDER — 0.9 % SODIUM CHLORIDE 0.9 %
VIAL (ML) INJECTION
Status: DISCONTINUED
Start: 2018-01-17 | End: 2018-01-17

## 2018-01-17 RX ORDER — HEPARIN SODIUM (PORCINE) LOCK FLUSH IV SOLN 100 UNIT/ML 100 UNIT/ML
5 SOLUTION INTRAVENOUS ONCE
Status: COMPLETED | OUTPATIENT
Start: 2018-01-17 | End: 2018-01-17

## 2018-01-17 RX ORDER — HEPARIN SODIUM (PORCINE) LOCK FLUSH IV SOLN 100 UNIT/ML 100 UNIT/ML
SOLUTION INTRAVENOUS
Status: COMPLETED
Start: 2018-01-17 | End: 2018-01-17

## 2018-01-17 RX ORDER — 0.9 % SODIUM CHLORIDE 0.9 %
10 VIAL (ML) INJECTION ONCE
Status: CANCELLED | OUTPATIENT
Start: 2018-01-17

## 2018-01-17 RX ADMIN — HEPARIN SODIUM (PORCINE) LOCK FLUSH IV SOLN 100 UNIT/ML 500 UNITS: 100 SOLUTION INTRAVENOUS at 16:01:00

## 2018-01-17 NOTE — PROGRESS NOTES
Port accessed using sterile technique, labs drawn and sent. Port deaccessed, site covered with 2x2 and paper tape. Pt appeared to tolerate. Discharged from lab stable.  Eloise Arthur has exam today, will coordinate next port flush with or without labs along with

## 2018-01-17 NOTE — PROGRESS NOTES
MIK       Pretty James is a 63year old male here for follow up of Diffuse large b-cell lymphoma, unspecified body region (hcc)  (primary encounter diagnosis)  Lymphoma of testis (hcc)     S/p Cycle 6 RCHOP/ RT completed:   The patient was treated t for environmental allergies. Neurological: Negative for dizziness, weakness, light-headedness, numbness and headaches. Hematological: Negative for adenopathy. Does not bruise/bleed easily. Psychiatric/Behavioral: Positive for sleep disturbance.  The p file     Family History   Problem Relation Age of Onset   • Heart Disease Mother    • Heart Disease Father    • Breast Cancer Sister 36         PHYSICAL EXAM:    /83 (BP Location: Left arm, Patient Position: Sitting)   Pulse 51   Temp 97.9 °F (36.6 ° superficial cervical, no deep cervical and no posterior cervical adenopathy present. He has no axillary adenopathy. Right axillary: No pectoral and no lateral adenopathy present.         Left axillary: No pectoral and no lateral adenopathy presen 0. 73 0.50 - 1.50 mg/dL   Calcium, Total 9.2 8.5 - 10.5 mg/dL   ALT 37 17 - 63 U/L   AST 31 15 - 41 U/L   Alkaline Phosphatase 41 32 - 100 U/L   Bilirubin, Total 0.5 0.3 - 1.2 mg/dL   Total Protein 6.8 5.9 - 8.4 g/dL   Albumin 4.2 3.5 - 4.8 g/dL   Globulin

## 2018-01-17 NOTE — TELEPHONE ENCOUNTER
Orders were already placed by Dr Tha Serrano back in October, patient can just come to hospital and get them done and doesn't have to be fasting for them.

## 2018-01-17 NOTE — TELEPHONE ENCOUNTER
Patient has an appointment on 01/25 but he wants to know if he is due for blood work before the appointment. He is coming in for a refill on his medications.

## 2018-01-22 ENCOUNTER — APPOINTMENT (OUTPATIENT)
Dept: LAB | Facility: HOSPITAL | Age: 63
End: 2018-01-22
Payer: COMMERCIAL

## 2018-01-22 DIAGNOSIS — IMO0001 UNCONTROLLED TYPE 2 DIABETES MELLITUS WITHOUT COMPLICATION, WITHOUT LONG-TERM CURRENT USE OF INSULIN: ICD-10-CM

## 2018-01-22 LAB
ALBUMIN SERPL BCP-MCNC: 4.4 G/DL (ref 3.5–4.8)
ALBUMIN/GLOB SERPL: 1.8 {RATIO} (ref 1–2)
ALP SERPL-CCNC: 40 U/L (ref 32–100)
ALT SERPL-CCNC: 33 U/L (ref 17–63)
ANION GAP SERPL CALC-SCNC: 10 MMOL/L (ref 0–18)
AST SERPL-CCNC: 27 U/L (ref 15–41)
BILIRUB SERPL-MCNC: 0.5 MG/DL (ref 0.3–1.2)
BUN SERPL-MCNC: 18 MG/DL (ref 8–20)
BUN/CREAT SERPL: 22.8 (ref 10–20)
CALCIUM SERPL-MCNC: 9.2 MG/DL (ref 8.5–10.5)
CHLORIDE SERPL-SCNC: 100 MMOL/L (ref 95–110)
CO2 SERPL-SCNC: 26 MMOL/L (ref 22–32)
CREAT SERPL-MCNC: 0.79 MG/DL (ref 0.5–1.5)
GLOBULIN PLAS-MCNC: 2.5 G/DL (ref 2.5–3.7)
GLUCOSE SERPL-MCNC: 134 MG/DL (ref 70–99)
OSMOLALITY UR CALC.SUM OF ELEC: 286 MOSM/KG (ref 275–295)
POTASSIUM SERPL-SCNC: 3.4 MMOL/L (ref 3.3–5.1)
PROT SERPL-MCNC: 6.9 G/DL (ref 5.9–8.4)
SODIUM SERPL-SCNC: 136 MMOL/L (ref 136–144)

## 2018-01-22 PROCEDURE — 83036 HEMOGLOBIN GLYCOSYLATED A1C: CPT

## 2018-01-22 PROCEDURE — 36415 COLL VENOUS BLD VENIPUNCTURE: CPT

## 2018-01-22 PROCEDURE — 80053 COMPREHEN METABOLIC PANEL: CPT

## 2018-01-23 LAB — HBA1C MFR BLD: 6.9 % (ref 4–6)

## 2018-01-25 ENCOUNTER — OFFICE VISIT (OUTPATIENT)
Dept: FAMILY MEDICINE CLINIC | Facility: CLINIC | Age: 63
End: 2018-01-25

## 2018-01-25 VITALS
BODY MASS INDEX: 28 KG/M2 | OXYGEN SATURATION: 98 % | HEART RATE: 53 BPM | SYSTOLIC BLOOD PRESSURE: 128 MMHG | WEIGHT: 194 LBS | RESPIRATION RATE: 18 BRPM | DIASTOLIC BLOOD PRESSURE: 60 MMHG

## 2018-01-25 DIAGNOSIS — I10 ESSENTIAL HYPERTENSION WITH GOAL BLOOD PRESSURE LESS THAN 140/90: ICD-10-CM

## 2018-01-25 DIAGNOSIS — E11.9 CONTROLLED TYPE 2 DIABETES MELLITUS WITHOUT COMPLICATION, WITHOUT LONG-TERM CURRENT USE OF INSULIN (HCC): Primary | ICD-10-CM

## 2018-01-25 PROBLEM — N48.1 BALANITIS: Status: RESOLVED | Noted: 2017-10-10 | Resolved: 2018-01-25

## 2018-01-25 PROBLEM — G97.1 HEADACHE, POST-LUMBAR PUNCTURE: Status: RESOLVED | Noted: 2017-08-07 | Resolved: 2018-01-25

## 2018-01-25 PROBLEM — G97.1 HEADACHE FOLLOWING LUMBAR PUNCTURE: Status: RESOLVED | Noted: 2017-06-14 | Resolved: 2018-01-25

## 2018-01-25 PROCEDURE — 99213 OFFICE O/P EST LOW 20 MIN: CPT

## 2018-01-25 RX ORDER — LOSARTAN POTASSIUM AND HYDROCHLOROTHIAZIDE 12.5; 5 MG/1; MG/1
1 TABLET ORAL DAILY
Qty: 90 TABLET | Refills: 0 | Status: SHIPPED | OUTPATIENT
Start: 2018-01-25 | End: 2018-06-20

## 2018-01-25 RX ORDER — METFORMIN HYDROCHLORIDE 1000 MG/1
1000 TABLET, FILM COATED, EXTENDED RELEASE ORAL
Qty: 90 TABLET | Refills: 0 | Status: SHIPPED | OUTPATIENT
Start: 2018-01-25 | End: 2018-01-26

## 2018-01-25 RX ORDER — PIOGLITAZONEHYDROCHLORIDE 15 MG/1
15 TABLET ORAL DAILY
Qty: 90 TABLET | Refills: 0 | Status: SHIPPED | OUTPATIENT
Start: 2018-01-25 | End: 2018-04-25

## 2018-01-25 NOTE — PATIENT INSTRUCTIONS
Dieta: Diabetes [Diabetic Diet]  Los alimentos son [place] herramienta importante que puede utilizar para controlar la diabetes y [place] reba.  Sinclair alimentos preeti equilibrados en las cantidades [employer] ayudará a controlar la concentración de azúcar · Coma menos grasa para ayudar a reducir muro riesgo de enfermedad del corazón. Consuma productos lácteos sin grasa o con bajo contenido graso y [place]. Evite los alimentos fritos. Use aceites de cocina no saturados.   · Consulte a muro nutricionista sob

## 2018-01-25 NOTE — PROGRESS NOTES
HPI:    Patient ID: Carmel Ferraro is a 63year old male. Diabetes   He presents for his follow-up diabetic visit. He has type 2 diabetes mellitus. Onset time: few years. His disease course has been improving.  There are no hypoglycemic associated sy light-headedness and headaches. All other systems reviewed and are negative.              Current Outpatient Prescriptions:  MetFORMIN HCl ER, MOD, 1000 MG (MOD) Oral Tablet 24 Hr Take 1 tablet (1,000 mg total) by mouth daily with breakfast. Disp: 90 tabl Tab 90 tablet 0      Sig: Take 1 tablet (15 mg total) by mouth daily. Losartan Potassium-HCTZ 50-12.5 MG Oral Tab 90 tablet 0      Sig: Take 1 tablet by mouth daily.            Imaging & Referrals:  None       [id number]

## 2018-01-26 ENCOUNTER — TELEPHONE (OUTPATIENT)
Dept: FAMILY MEDICINE CLINIC | Facility: CLINIC | Age: 63
End: 2018-01-26

## 2018-01-26 RX ORDER — METFORMIN HYDROCHLORIDE 500 MG/1
500 TABLET, EXTENDED RELEASE ORAL 2 TIMES DAILY WITH MEALS
Qty: 180 TABLET | Refills: 0 | Status: SHIPPED | OUTPATIENT
Start: 2018-01-26 | End: 2018-04-25

## 2018-01-26 RX ORDER — FENOFIBRATE 145 MG/1
145 TABLET, COATED ORAL DAILY
Qty: 90 TABLET | Refills: 1 | Status: SHIPPED | OUTPATIENT
Start: 2018-01-26 | End: 2018-07-30

## 2018-01-26 NOTE — TELEPHONE ENCOUNTER
Received notification via fax that Metformin MOD is not covered. Calling pharmacy to find out what other medication is not covered. Fenofibrate is not covered.

## 2018-01-26 NOTE — TELEPHONE ENCOUNTER
Per MD the following has been adjusted:  1. Metformin  mg 2 times daily (BID)  2. Fenofibrate 145 mg 1 time daily    Notified patient that medications has been adjusted, and should be ready for  soon. Verbalized understanding.

## 2018-01-26 NOTE — TELEPHONE ENCOUNTER
Pt called, stated he went to pharmacy to  medication and only the Actos was given to him. Pt stated the pharmacy advised him to reach the office regarding the other two medications that were not covered.

## 2018-01-29 ENCOUNTER — TELEPHONE (OUTPATIENT)
Dept: FAMILY MEDICINE CLINIC | Facility: CLINIC | Age: 63
End: 2018-01-29

## 2018-01-29 NOTE — TELEPHONE ENCOUNTER
Patient is calling stating Dr. Damaris Markham changed his dosage higher. He states he does not remember doctor telling him the dosage was going to change. Patient did not  the medication because he wants to confirm first that this is correct.

## 2018-01-29 NOTE — TELEPHONE ENCOUNTER
Fenofibrate dosage was increased to 145 mg because the lower dose was not covered by insurance. To call office back to clarify whether this is the correct medication he is concerned about.

## 2018-02-28 ENCOUNTER — NURSE ONLY (OUTPATIENT)
Dept: HEMATOLOGY/ONCOLOGY | Facility: HOSPITAL | Age: 63
End: 2018-02-28
Attending: INTERNAL MEDICINE
Payer: COMMERCIAL

## 2018-02-28 DIAGNOSIS — C83.30 DLBCL (DIFFUSE LARGE B CELL LYMPHOMA) (HCC): ICD-10-CM

## 2018-02-28 DIAGNOSIS — Z95.828 PORTACATH IN PLACE: Primary | ICD-10-CM

## 2018-02-28 PROCEDURE — 96523 IRRIG DRUG DELIVERY DEVICE: CPT

## 2018-02-28 RX ORDER — 0.9 % SODIUM CHLORIDE 0.9 %
VIAL (ML) INJECTION
Status: DISCONTINUED
Start: 2018-02-28 | End: 2018-02-28

## 2018-02-28 RX ORDER — HEPARIN SODIUM (PORCINE) LOCK FLUSH IV SOLN 100 UNIT/ML 100 UNIT/ML
5 SOLUTION INTRAVENOUS ONCE
Status: CANCELLED | OUTPATIENT
Start: 2018-02-28

## 2018-02-28 RX ORDER — 0.9 % SODIUM CHLORIDE 0.9 %
10 VIAL (ML) INJECTION ONCE
Status: CANCELLED | OUTPATIENT
Start: 2018-02-28

## 2018-02-28 RX ORDER — HEPARIN SODIUM (PORCINE) LOCK FLUSH IV SOLN 100 UNIT/ML 100 UNIT/ML
5 SOLUTION INTRAVENOUS ONCE
Status: COMPLETED | OUTPATIENT
Start: 2018-02-28 | End: 2018-02-28

## 2018-02-28 RX ORDER — HEPARIN SODIUM (PORCINE) LOCK FLUSH IV SOLN 100 UNIT/ML 100 UNIT/ML
SOLUTION INTRAVENOUS
Status: COMPLETED
Start: 2018-02-28 | End: 2018-02-28

## 2018-02-28 RX ADMIN — HEPARIN SODIUM (PORCINE) LOCK FLUSH IV SOLN 100 UNIT/ML 500 UNITS: 100 SOLUTION INTRAVENOUS at 16:39:00

## 2018-02-28 NOTE — PROGRESS NOTES
Patient here for port flush. No labs ordered. Ambulated from waiting room with steady gait. Patient states he is feeling well, has been working. Port accessed using sterile technique, good blood return noted flushes without difficulty.  [facility],

## 2018-04-18 ENCOUNTER — OFFICE VISIT (OUTPATIENT)
Dept: HEMATOLOGY/ONCOLOGY | Facility: HOSPITAL | Age: 63
End: 2018-04-18
Attending: INTERNAL MEDICINE
Payer: COMMERCIAL

## 2018-04-18 VITALS
DIASTOLIC BLOOD PRESSURE: 78 MMHG | HEART RATE: 58 BPM | TEMPERATURE: 98 F | RESPIRATION RATE: 16 BRPM | WEIGHT: 194 LBS | HEIGHT: 70 IN | BODY MASS INDEX: 27.77 KG/M2 | SYSTOLIC BLOOD PRESSURE: 129 MMHG

## 2018-04-18 DIAGNOSIS — C83.30 DIFFUSE LARGE B-CELL LYMPHOMA, UNSPECIFIED BODY REGION (HCC): Primary | ICD-10-CM

## 2018-04-18 DIAGNOSIS — C85.99 LYMPHOMA OF TESTIS (HCC): ICD-10-CM

## 2018-04-18 DIAGNOSIS — Z95.828 PORTACATH IN PLACE: Primary | ICD-10-CM

## 2018-04-18 DIAGNOSIS — C83.30 DIFFUSE LARGE B-CELL LYMPHOMA, UNSPECIFIED BODY REGION (HCC): ICD-10-CM

## 2018-04-18 PROCEDURE — 85025 COMPLETE CBC W/AUTO DIFF WBC: CPT

## 2018-04-18 PROCEDURE — 36591 DRAW BLOOD OFF VENOUS DEVICE: CPT

## 2018-04-18 PROCEDURE — 80053 COMPREHEN METABOLIC PANEL: CPT

## 2018-04-18 PROCEDURE — 99213 OFFICE O/P EST LOW 20 MIN: CPT | Performed by: INTERNAL MEDICINE

## 2018-04-18 PROCEDURE — 83615 LACTATE (LD) (LDH) ENZYME: CPT

## 2018-04-18 PROCEDURE — A4216 STERILE WATER/SALINE, 10 ML: HCPCS

## 2018-04-18 RX ORDER — 0.9 % SODIUM CHLORIDE 0.9 %
10 VIAL (ML) INJECTION ONCE
Status: CANCELLED | OUTPATIENT
Start: 2018-04-18

## 2018-04-18 RX ORDER — HEPARIN SODIUM (PORCINE) LOCK FLUSH IV SOLN 100 UNIT/ML 100 UNIT/ML
SOLUTION INTRAVENOUS
Status: COMPLETED
Start: 2018-04-18 | End: 2018-04-18

## 2018-04-18 RX ORDER — HEPARIN SODIUM (PORCINE) LOCK FLUSH IV SOLN 100 UNIT/ML 100 UNIT/ML
5 SOLUTION INTRAVENOUS ONCE
Status: COMPLETED | OUTPATIENT
Start: 2018-04-18 | End: 2018-04-18

## 2018-04-18 RX ORDER — HEPARIN SODIUM (PORCINE) LOCK FLUSH IV SOLN 100 UNIT/ML 100 UNIT/ML
5 SOLUTION INTRAVENOUS ONCE
Status: CANCELLED | OUTPATIENT
Start: 2018-04-18

## 2018-04-18 RX ORDER — 0.9 % SODIUM CHLORIDE 0.9 %
VIAL (ML) INJECTION
Status: DISCONTINUED
Start: 2018-04-18 | End: 2018-04-18

## 2018-04-18 RX ADMIN — HEPARIN SODIUM (PORCINE) LOCK FLUSH IV SOLN 100 UNIT/ML 500 UNITS: 100 SOLUTION INTRAVENOUS at 15:42:00

## 2018-04-18 NOTE — PROGRESS NOTES
Met with patient and APN. Agree with plan as per APN, continue with surveillance. Patient with c/o HA that he states different than when he had IT chemo HA. States that he had in the past he had similar HAs with visual changes and went to optho.   He

## 2018-04-18 NOTE — PROGRESS NOTES
HPI Onalee Bence is a 63year old male here for follow up of Diffuse large b-cell lymphoma, unspecified body region (hcc)  (primary encounter diagnosis)  Lymphoma of testis (hcc)     S/p Cycle 6 RCHOP/ RT completed:   The patient was treated t neck pain. Skin: Negative. Negative for color change and rash. Allergic/Immunologic: Negative for environmental allergies. Neurological: Positive for dizziness (2 days last week). Negative for weakness, light-headedness, numbness and headaches.    He Narrative   None on file     Family History   Problem Relation Age of Onset   • Heart Disease Mother    • Heart Disease Father    • Breast Cancer Sister 36         PHYSICAL EXAM:    /78 (BP Location: Left arm, Patient Position: Sitting, Cuff Size: ad present. Left cervical: No superficial cervical, no deep cervical and no posterior cervical adenopathy present. He has no axillary adenopathy. Right axillary: No pectoral and no lateral adenopathy present.         Left axillary: No pectoral 16 8 - 20 mg/dL   Creatinine 0.74 0.50 - 1.50 mg/dL   Calcium, Total 9.5 8.5 - 10.5 mg/dL   ALT 35 17 - 63 U/L   AST 39 15 - 41 U/L   Alkaline Phosphatase 41 32 - 100 U/L   Bilirubin, Total 0.4 0.3 - 1.2 mg/dL   Total Protein 6.8 5.9 - 8.4 g/dL   Albumin 4 0 - 18 mmol/L 9   BUN/CREA RATIO      10.0 - 20.0 20.5 (H)   CALCULATED OSMOLALITY      275 - 295 mOsm/kg 281   GFR, Non-      >=60 >60   GFR, -American      >=60 >60   WBC      4.0 - 11.0 K/UL 5.2   RBC      4.50 - 5.90 M/UL 3.76

## 2018-04-23 ENCOUNTER — APPOINTMENT (OUTPATIENT)
Dept: LAB | Facility: HOSPITAL | Age: 63
End: 2018-04-23
Payer: COMMERCIAL

## 2018-04-23 DIAGNOSIS — E11.9 CONTROLLED TYPE 2 DIABETES MELLITUS WITHOUT COMPLICATION, WITHOUT LONG-TERM CURRENT USE OF INSULIN (HCC): ICD-10-CM

## 2018-04-23 PROCEDURE — 83036 HEMOGLOBIN GLYCOSYLATED A1C: CPT

## 2018-04-23 PROCEDURE — 80053 COMPREHEN METABOLIC PANEL: CPT

## 2018-04-23 PROCEDURE — 36415 COLL VENOUS BLD VENIPUNCTURE: CPT

## 2018-04-25 ENCOUNTER — OFFICE VISIT (OUTPATIENT)
Dept: FAMILY MEDICINE CLINIC | Facility: CLINIC | Age: 63
End: 2018-04-25

## 2018-04-25 VITALS
SYSTOLIC BLOOD PRESSURE: 136 MMHG | DIASTOLIC BLOOD PRESSURE: 72 MMHG | OXYGEN SATURATION: 98 % | HEART RATE: 65 BPM | WEIGHT: 193 LBS | BODY MASS INDEX: 28 KG/M2

## 2018-04-25 DIAGNOSIS — R39.11 URINARY HESITANCY: ICD-10-CM

## 2018-04-25 DIAGNOSIS — Z01.89 ENCOUNTER FOR ROUTINE LABORATORY TESTING: ICD-10-CM

## 2018-04-25 DIAGNOSIS — E11.9 CONTROLLED TYPE 2 DIABETES MELLITUS WITHOUT COMPLICATION, WITHOUT LONG-TERM CURRENT USE OF INSULIN (HCC): Primary | ICD-10-CM

## 2018-04-25 DIAGNOSIS — E78.1 PURE HYPERGLYCERIDEMIA: ICD-10-CM

## 2018-04-25 DIAGNOSIS — Z85.46 HISTORY OF PROSTATE CANCER: ICD-10-CM

## 2018-04-25 PROCEDURE — 99214 OFFICE O/P EST MOD 30 MIN: CPT

## 2018-04-25 RX ORDER — PIOGLITAZONEHYDROCHLORIDE 15 MG/1
15 TABLET ORAL DAILY
Qty: 90 TABLET | Refills: 0 | Status: SHIPPED | OUTPATIENT
Start: 2018-04-25 | End: 2018-07-30

## 2018-04-25 RX ORDER — METFORMIN HYDROCHLORIDE 500 MG/1
500 TABLET, EXTENDED RELEASE ORAL 2 TIMES DAILY WITH MEALS
Qty: 180 TABLET | Refills: 0 | Status: SHIPPED | OUTPATIENT
Start: 2018-04-25 | End: 2018-07-30

## 2018-04-25 RX ORDER — TAMSULOSIN HYDROCHLORIDE 0.4 MG/1
0.4 CAPSULE ORAL DAILY
Qty: 90 CAPSULE | Refills: 0 | Status: SHIPPED | OUTPATIENT
Start: 2018-04-25 | End: 2018-07-30

## 2018-04-25 NOTE — PROGRESS NOTES
HPI:    Patient ID: Geovanna Vargas is a 63year old male. Diabetes   He presents for his follow-up diabetic visit. He has type 2 diabetes mellitus. Onset time: few years. His disease course has been improving.  There are no hypoglycemic associated sy pain, rhinorrhea and sore throat. Eyes: Negative for blurred vision, photophobia, discharge and visual disturbance. Respiratory: Negative for cough and shortness of breath. Cardiovascular: Negative for chest pain and palpitations.    Omar Miles diabetes mellitus without complication, without long-term current use of insulin ([facility]  Pt reassured and all questions answered. Lab results reviewed and discussed with pt. Latest Hgb A1c controlled @ 6.5. Pt counseled with regards to diet and exercise.

## 2018-04-29 ENCOUNTER — HOSPITAL ENCOUNTER (OUTPATIENT)
Dept: CT IMAGING | Facility: HOSPITAL | Age: 63
Discharge: HOME | End: 2018-04-29
Attending: INTERNAL MEDICINE
Payer: COMMERCIAL

## 2018-04-29 DIAGNOSIS — C85.99 LYMPHOMA OF TESTIS (HCC): ICD-10-CM

## 2018-04-29 DIAGNOSIS — C83.30 DIFFUSE LARGE B-CELL LYMPHOMA, UNSPECIFIED BODY REGION (HCC): ICD-10-CM

## 2018-04-29 PROCEDURE — 82565 ASSAY OF CREATININE: CPT

## 2018-04-29 PROCEDURE — 71260 CT THORAX DX C+: CPT | Performed by: INTERNAL MEDICINE

## 2018-04-29 PROCEDURE — 74177 CT ABD & PELVIS W/CONTRAST: CPT | Performed by: INTERNAL MEDICINE

## 2018-06-20 DIAGNOSIS — I10 ESSENTIAL HYPERTENSION WITH GOAL BLOOD PRESSURE LESS THAN 140/90: ICD-10-CM

## 2018-06-21 RX ORDER — LOSARTAN POTASSIUM AND HYDROCHLOROTHIAZIDE 12.5; 5 MG/1; MG/1
1 TABLET ORAL DAILY
Qty: 30 TABLET | Refills: 0 | Status: SHIPPED | OUTPATIENT
Start: 2018-06-21 | End: 2018-07-30

## 2018-07-22 DIAGNOSIS — I10 ESSENTIAL HYPERTENSION WITH GOAL BLOOD PRESSURE LESS THAN 140/90: ICD-10-CM

## 2018-07-23 DIAGNOSIS — C85.99 LYMPHOMA OF TESTIS (HCC): Primary | ICD-10-CM

## 2018-07-23 DIAGNOSIS — Z85.72 HISTORY OF B-CELL LYMPHOMA: ICD-10-CM

## 2018-07-24 ENCOUNTER — OFFICE VISIT (OUTPATIENT)
Dept: HEMATOLOGY/ONCOLOGY | Facility: HOSPITAL | Age: 63
End: 2018-07-24
Attending: INTERNAL MEDICINE
Payer: COMMERCIAL

## 2018-07-24 VITALS
WEIGHT: 191 LBS | TEMPERATURE: 98 F | SYSTOLIC BLOOD PRESSURE: 114 MMHG | DIASTOLIC BLOOD PRESSURE: 75 MMHG | BODY MASS INDEX: 27.35 KG/M2 | HEART RATE: 58 BPM | RESPIRATION RATE: 16 BRPM | HEIGHT: 70 IN

## 2018-07-24 DIAGNOSIS — C83.39 DIFFUSE LARGE B-CELL LYMPHOMA OF SOLID ORGAN EXCLUDING SPLEEN (HCC): Primary | ICD-10-CM

## 2018-07-24 DIAGNOSIS — C83.30 DLBCL (DIFFUSE LARGE B CELL LYMPHOMA) (HCC): ICD-10-CM

## 2018-07-24 DIAGNOSIS — Z85.46 HISTORY OF PROSTATE CANCER: ICD-10-CM

## 2018-07-24 DIAGNOSIS — Z85.72 HISTORY OF B-CELL LYMPHOMA: ICD-10-CM

## 2018-07-24 DIAGNOSIS — Z95.828 PORT-A-CATH IN PLACE: Primary | ICD-10-CM

## 2018-07-24 LAB
ALBUMIN SERPL BCP-MCNC: 4.5 G/DL (ref 3.5–4.8)
ALBUMIN/GLOB SERPL: 1.5 {RATIO} (ref 1–2)
ALP SERPL-CCNC: 43 U/L (ref 32–100)
ALT SERPL-CCNC: 30 U/L (ref 17–63)
ANION GAP SERPL CALC-SCNC: 8 MMOL/L (ref 0–18)
AST SERPL-CCNC: 28 U/L (ref 15–41)
BASOPHILS # BLD: 0.1 K/UL (ref 0–0.2)
BASOPHILS NFR BLD: 1 %
BILIRUB SERPL-MCNC: 0.5 MG/DL (ref 0.3–1.2)
BUN SERPL-MCNC: 20 MG/DL (ref 8–20)
BUN/CREAT SERPL: 22.7 (ref 10–20)
CALCIUM SERPL-MCNC: 9.9 MG/DL (ref 8.5–10.5)
CHLORIDE SERPL-SCNC: 104 MMOL/L (ref 95–110)
CO2 SERPL-SCNC: 27 MMOL/L (ref 22–32)
CREAT SERPL-MCNC: 0.88 MG/DL (ref 0.5–1.5)
EOSINOPHIL # BLD: 0.1 K/UL (ref 0–0.7)
EOSINOPHIL NFR BLD: 3 %
ERYTHROCYTE [DISTWIDTH] IN BLOOD BY AUTOMATED COUNT: 13.6 % (ref 11–15)
GLOBULIN PLAS-MCNC: 3 G/DL (ref 2.5–3.7)
GLUCOSE SERPL-MCNC: 138 MG/DL (ref 70–99)
HCT VFR BLD AUTO: 37.2 % (ref 41–52)
HGB BLD-MCNC: 12.7 G/DL (ref 13.5–17.5)
LDH SERPL L TO P-CCNC: 144 U/L (ref 98–192)
LYMPHOCYTES # BLD: 2.2 K/UL (ref 1–4)
LYMPHOCYTES NFR BLD: 46 %
MCH RBC QN AUTO: 33.8 PG (ref 27–32)
MCHC RBC AUTO-ENTMCNC: 34.2 G/DL (ref 32–37)
MCV RBC AUTO: 99.1 FL (ref 80–100)
MONOCYTES # BLD: 0.3 K/UL (ref 0–1)
MONOCYTES NFR BLD: 7 %
NEUTROPHILS # BLD AUTO: 2.1 K/UL (ref 1.8–7.7)
NEUTROPHILS NFR BLD: 43 %
OSMOLALITY UR CALC.SUM OF ELEC: 293 MOSM/KG (ref 275–295)
PATIENT FASTING: NO
PLATELET # BLD AUTO: 217 K/UL (ref 140–400)
PMV BLD AUTO: 9 FL (ref 7.4–10.3)
POTASSIUM SERPL-SCNC: 3.6 MMOL/L (ref 3.3–5.1)
PROT SERPL-MCNC: 7.5 G/DL (ref 5.9–8.4)
RBC # BLD AUTO: 3.76 M/UL (ref 4.5–5.9)
SODIUM SERPL-SCNC: 139 MMOL/L (ref 136–144)
WBC # BLD AUTO: 4.8 K/UL (ref 4–11)

## 2018-07-24 PROCEDURE — 85025 COMPLETE CBC W/AUTO DIFF WBC: CPT

## 2018-07-24 PROCEDURE — 36591 DRAW BLOOD OFF VENOUS DEVICE: CPT

## 2018-07-24 PROCEDURE — 83615 LACTATE (LD) (LDH) ENZYME: CPT

## 2018-07-24 PROCEDURE — 99213 OFFICE O/P EST LOW 20 MIN: CPT | Performed by: INTERNAL MEDICINE

## 2018-07-24 PROCEDURE — 80053 COMPREHEN METABOLIC PANEL: CPT

## 2018-07-24 PROCEDURE — A4216 STERILE WATER/SALINE, 10 ML: HCPCS

## 2018-07-24 RX ORDER — 0.9 % SODIUM CHLORIDE 0.9 %
VIAL (ML) INJECTION
Status: DISCONTINUED
Start: 2018-07-24 | End: 2018-07-24

## 2018-07-24 RX ORDER — HEPARIN SODIUM (PORCINE) LOCK FLUSH IV SOLN 100 UNIT/ML 100 UNIT/ML
5 SOLUTION INTRAVENOUS ONCE
Status: COMPLETED | OUTPATIENT
Start: 2018-07-24 | End: 2018-07-24

## 2018-07-24 RX ORDER — 0.9 % SODIUM CHLORIDE 0.9 %
10 VIAL (ML) INJECTION ONCE
Status: CANCELLED | OUTPATIENT
Start: 2018-07-24

## 2018-07-24 RX ORDER — HEPARIN SODIUM (PORCINE) LOCK FLUSH IV SOLN 100 UNIT/ML 100 UNIT/ML
SOLUTION INTRAVENOUS
Status: COMPLETED
Start: 2018-07-24 | End: 2018-07-24

## 2018-07-24 RX ORDER — HEPARIN SODIUM (PORCINE) LOCK FLUSH IV SOLN 100 UNIT/ML 100 UNIT/ML
5 SOLUTION INTRAVENOUS ONCE
Status: CANCELLED | OUTPATIENT
Start: 2018-07-24

## 2018-07-24 RX ADMIN — HEPARIN SODIUM (PORCINE) LOCK FLUSH IV SOLN 100 UNIT/ML 500 UNITS: 100 SOLUTION INTRAVENOUS at 16:28:00

## 2018-07-24 NOTE — PROGRESS NOTES
MIK Waters is a 63year old male here for follow up of History of b-cell lymphoma  (primary encounter diagnosis)     S/p Cycle 6 RCHOP/ RT completed:   The patient was treated to the contralateral testis and scrotum to a dose of 3000 cGy rash.   Allergic/Immunologic: Negative for environmental allergies. Neurological: Negative for dizziness, weakness, light-headedness, numbness and headaches. Hematological: Negative for adenopathy. Does not bruise/bleed easily.    Psychiatric/Behavioral Disease Mother    • Heart Disease Father    • Breast Cancer Sister 36         PHYSICAL EXAM:    /75 (BP Location: Left arm, Patient Position: Sitting, Cuff Size: adult)   Pulse 58   Temp 97.7 °F (36.5 °C) (Oral)   Resp 16   Ht 1.778 m (5' 10\")   Wt cervical adenopathy present. He has no axillary adenopathy. Right axillary: No pectoral and no lateral adenopathy present. Left axillary: No pectoral and no lateral adenopathy present.        Right: No inguinal and no supraclavicular adeno 30 17 - 63 U/L   AST 28 15 - 41 U/L   Alkaline Phosphatase 43 32 - 100 U/L   Bilirubin, Total 0.5 0.3 - 1.2 mg/dL   Total Protein 7.5 5.9 - 8.4 g/dL   Albumin 4.5 3.5 - 4.8 g/dL   Globulin 3.0 2.5 - 3.7 g/dL   A/G Ratio 1.5 1.0 - 2.0   Anion Gap 8 0 - 18 m 295 mOsm/kg 281   GFR, Non-      >=60 >60   GFR, -American      >=60 >60   WBC      4.0 - 11.0 K/UL 5.2   RBC      4.50 - 5.90 M/UL 3.76 (L)   Hemoglobin      13.5 - 17.5 g/dL 12.4 (L)   Hematocrit      41.0 - 52.0 % 36.9 (L)   MCV No significant axillary adenopathy. LUNGS:             Biapical pleural-parenchymal scarring not significantly changed. No focal airspace consolidation. Mild dependent atelectasis.  Right lower lobe subpleural perivascular nodule measuring 6 x 6 mm (series ORGANS:         The prostate gland is enlarged. BONES:             Multilevel degenerative changes of the visualized spine. No acute-appearing fracture or suspicious osseous lesion. OTHER:             Surgical changes in the right inguinal region.  Fat

## 2018-07-25 ENCOUNTER — TELEPHONE (OUTPATIENT)
Dept: HEMATOLOGY/ONCOLOGY | Facility: HOSPITAL | Age: 63
End: 2018-07-25

## 2018-07-25 ENCOUNTER — APPOINTMENT (OUTPATIENT)
Dept: HEMATOLOGY/ONCOLOGY | Facility: HOSPITAL | Age: 63
End: 2018-07-25
Attending: INTERNAL MEDICINE
Payer: COMMERCIAL

## 2018-07-25 NOTE — TELEPHONE ENCOUNTER
Left voice message for Kali Gottlieb to call to schedule a follow up visit with Dr. Rajwinder Barboza in 3 months.

## 2018-07-26 RX ORDER — LOSARTAN POTASSIUM AND HYDROCHLOROTHIAZIDE 12.5; 5 MG/1; MG/1
1 TABLET ORAL DAILY
Qty: 30 TABLET | Refills: 0 | OUTPATIENT
Start: 2018-07-26 | End: 2018-08-25

## 2018-07-28 ENCOUNTER — APPOINTMENT (OUTPATIENT)
Dept: LAB | Facility: HOSPITAL | Age: 63
End: 2018-07-28
Payer: COMMERCIAL

## 2018-07-28 DIAGNOSIS — Z01.89 ENCOUNTER FOR ROUTINE LABORATORY TESTING: ICD-10-CM

## 2018-07-28 DIAGNOSIS — Z85.46 HISTORY OF PROSTATE CANCER: ICD-10-CM

## 2018-07-28 DIAGNOSIS — E78.1 PURE HYPERGLYCERIDEMIA: ICD-10-CM

## 2018-07-28 DIAGNOSIS — E11.9 CONTROLLED TYPE 2 DIABETES MELLITUS WITHOUT COMPLICATION, WITHOUT LONG-TERM CURRENT USE OF INSULIN (HCC): ICD-10-CM

## 2018-07-28 LAB
ALBUMIN SERPL BCP-MCNC: 4.2 G/DL (ref 3.5–4.8)
ALBUMIN/GLOB SERPL: 1.6 {RATIO} (ref 1–2)
ALP SERPL-CCNC: 41 U/L (ref 32–100)
ALT SERPL-CCNC: 29 U/L (ref 17–63)
ANION GAP SERPL CALC-SCNC: 8 MMOL/L (ref 0–18)
AST SERPL-CCNC: 27 U/L (ref 15–41)
BACTERIA UR QL AUTO: NEGATIVE /HPF
BILIRUB SERPL-MCNC: 0.8 MG/DL (ref 0.3–1.2)
BILIRUB UR QL: NEGATIVE
BUN SERPL-MCNC: 12 MG/DL (ref 8–20)
BUN/CREAT SERPL: 18.2 (ref 10–20)
CALCIUM SERPL-MCNC: 9.2 MG/DL (ref 8.5–10.5)
CHLORIDE SERPL-SCNC: 105 MMOL/L (ref 95–110)
CHOLEST SERPL-MCNC: 113 MG/DL (ref 110–200)
CLARITY UR: CLEAR
CO2 SERPL-SCNC: 26 MMOL/L (ref 22–32)
COLOR UR: YELLOW
CREAT SERPL-MCNC: 0.66 MG/DL (ref 0.5–1.5)
CREAT UR-MCNC: 70.3 MG/DL
ERYTHROCYTE [DISTWIDTH] IN BLOOD BY AUTOMATED COUNT: 13.4 % (ref 11–15)
GLOBULIN PLAS-MCNC: 2.7 G/DL (ref 2.5–3.7)
GLUCOSE SERPL-MCNC: 105 MG/DL (ref 70–99)
GLUCOSE UR-MCNC: NEGATIVE MG/DL
HBA1C MFR BLD: 6.4 % (ref 4–6)
HCT VFR BLD AUTO: 39.1 % (ref 41–52)
HDLC SERPL-MCNC: 42 MG/DL
HGB BLD-MCNC: 13.3 G/DL (ref 13.5–17.5)
HGB UR QL STRIP.AUTO: NEGATIVE
KETONES UR-MCNC: NEGATIVE MG/DL
LDLC SERPL CALC-MCNC: 59 MG/DL (ref 0–99)
LEUKOCYTE ESTERASE UR QL STRIP.AUTO: NEGATIVE
MCH RBC QN AUTO: 33.4 PG (ref 27–32)
MCHC RBC AUTO-ENTMCNC: 33.9 G/DL (ref 32–37)
MCV RBC AUTO: 98.3 FL (ref 80–100)
MICROALBUMIN UR-MCNC: 0.3 MG/DL (ref 0–1.8)
MICROALBUMIN/CREAT UR: 4.3 MG/G{CREAT} (ref 0–20)
NITRITE UR QL STRIP.AUTO: NEGATIVE
NONHDLC SERPL-MCNC: 71 MG/DL
OSMOLALITY UR CALC.SUM OF ELEC: 288 MOSM/KG (ref 275–295)
PATIENT FASTING: YES
PH UR: 8 [PH] (ref 5–8)
PLATELET # BLD AUTO: 212 K/UL (ref 140–400)
PMV BLD AUTO: 9.3 FL (ref 7.4–10.3)
POTASSIUM SERPL-SCNC: 4.1 MMOL/L (ref 3.3–5.1)
PROT SERPL-MCNC: 6.9 G/DL (ref 5.9–8.4)
PROT UR-MCNC: NEGATIVE MG/DL
PSA SERPL-MCNC: 4.2 NG/ML (ref 0–4)
RBC # BLD AUTO: 3.98 M/UL (ref 4.5–5.9)
RBC #/AREA URNS AUTO: 1 /HPF
SODIUM SERPL-SCNC: 139 MMOL/L (ref 136–144)
SP GR UR STRIP: 1.02 (ref 1–1.03)
TRIGL SERPL-MCNC: 62 MG/DL (ref 1–149)
UROBILINOGEN UR STRIP-ACNC: <2
VIT C UR-MCNC: NEGATIVE MG/DL
WBC # BLD AUTO: 4.7 K/UL (ref 4–11)
WBC #/AREA URNS AUTO: <1 /HPF

## 2018-07-28 PROCEDURE — 81003 URINALYSIS AUTO W/O SCOPE: CPT

## 2018-07-28 PROCEDURE — 85027 COMPLETE CBC AUTOMATED: CPT

## 2018-07-28 PROCEDURE — 82043 UR ALBUMIN QUANTITATIVE: CPT

## 2018-07-28 PROCEDURE — 80061 LIPID PANEL: CPT

## 2018-07-28 PROCEDURE — 83036 HEMOGLOBIN GLYCOSYLATED A1C: CPT

## 2018-07-28 PROCEDURE — 36415 COLL VENOUS BLD VENIPUNCTURE: CPT

## 2018-07-28 PROCEDURE — 80053 COMPREHEN METABOLIC PANEL: CPT

## 2018-07-28 PROCEDURE — 82570 ASSAY OF URINE CREATININE: CPT

## 2018-07-30 ENCOUNTER — TELEPHONE (OUTPATIENT)
Dept: HEMATOLOGY/ONCOLOGY | Facility: HOSPITAL | Age: 63
End: 2018-07-30

## 2018-07-30 ENCOUNTER — OFFICE VISIT (OUTPATIENT)
Dept: FAMILY MEDICINE CLINIC | Facility: CLINIC | Age: 63
End: 2018-07-30

## 2018-07-30 VITALS
DIASTOLIC BLOOD PRESSURE: 76 MMHG | HEIGHT: 70 IN | OXYGEN SATURATION: 98 % | BODY MASS INDEX: 27.2 KG/M2 | HEART RATE: 63 BPM | SYSTOLIC BLOOD PRESSURE: 134 MMHG | WEIGHT: 190 LBS

## 2018-07-30 DIAGNOSIS — E78.1 PURE HYPERGLYCERIDEMIA: ICD-10-CM

## 2018-07-30 DIAGNOSIS — Z85.46 HISTORY OF PROSTATE CANCER: ICD-10-CM

## 2018-07-30 DIAGNOSIS — N52.9 ERECTILE DYSFUNCTION, UNSPECIFIED ERECTILE DYSFUNCTION TYPE: ICD-10-CM

## 2018-07-30 DIAGNOSIS — Z98.890 HISTORY OF COLONOSCOPY WITH POLYPECTOMY: ICD-10-CM

## 2018-07-30 DIAGNOSIS — Z85.47 HISTORY OF TESTICULAR CANCER: ICD-10-CM

## 2018-07-30 DIAGNOSIS — I10 ESSENTIAL HYPERTENSION WITH GOAL BLOOD PRESSURE LESS THAN 140/90: ICD-10-CM

## 2018-07-30 DIAGNOSIS — Z86.010 HISTORY OF COLONOSCOPY WITH POLYPECTOMY: ICD-10-CM

## 2018-07-30 DIAGNOSIS — E11.9 CONTROLLED TYPE 2 DIABETES MELLITUS WITHOUT COMPLICATION, WITHOUT LONG-TERM CURRENT USE OF INSULIN (HCC): ICD-10-CM

## 2018-07-30 DIAGNOSIS — C83.39 DIFFUSE LARGE B-CELL LYMPHOMA OF SOLID ORGAN EXCLUDING SPLEEN (HCC): Primary | ICD-10-CM

## 2018-07-30 DIAGNOSIS — Z87.891 FORMER SMOKER: ICD-10-CM

## 2018-07-30 DIAGNOSIS — Z00.01 ENCOUNTER FOR ROUTINE ADULT HEALTH EXAMINATION WITH ABNORMAL FINDINGS: Primary | ICD-10-CM

## 2018-07-30 DIAGNOSIS — E66.3 OVERWEIGHT (BMI 25.0-29.9): ICD-10-CM

## 2018-07-30 DIAGNOSIS — R97.20 ELEVATED PSA MEASUREMENT: ICD-10-CM

## 2018-07-30 DIAGNOSIS — Z28.21 IMMUNIZATION NOT CARRIED OUT BECAUSE OF PATIENT REFUSAL: ICD-10-CM

## 2018-07-30 DIAGNOSIS — R39.11 URINARY HESITANCY: ICD-10-CM

## 2018-07-30 DIAGNOSIS — K64.8 INTERNAL HEMORRHOIDS: ICD-10-CM

## 2018-07-30 PROBLEM — Z13.31 DEPRESSION SCREENING: Status: RESOLVED | Noted: 2017-07-18 | Resolved: 2018-07-30

## 2018-07-30 PROBLEM — R51.9 FREQUENT HEADACHES: Status: RESOLVED | Noted: 2017-07-18 | Resolved: 2018-07-30

## 2018-07-30 PROCEDURE — 99396 PREV VISIT EST AGE 40-64: CPT

## 2018-07-30 RX ORDER — METFORMIN HYDROCHLORIDE 500 MG/1
500 TABLET, EXTENDED RELEASE ORAL 2 TIMES DAILY WITH MEALS
Qty: 180 TABLET | Refills: 0 | Status: SHIPPED | OUTPATIENT
Start: 2018-07-30 | End: 2018-10-29

## 2018-07-30 RX ORDER — TAMSULOSIN HYDROCHLORIDE 0.4 MG/1
0.4 CAPSULE ORAL DAILY
Qty: 90 CAPSULE | Refills: 3 | Status: SHIPPED | OUTPATIENT
Start: 2018-07-30 | End: 2018-11-20 | Stop reason: DRUGHIGH

## 2018-07-30 RX ORDER — FENOFIBRATE 145 MG/1
145 TABLET, COATED ORAL DAILY
Qty: 90 TABLET | Refills: 3 | Status: SHIPPED | OUTPATIENT
Start: 2018-07-30 | End: 2019-02-18 | Stop reason: ALTCHOICE

## 2018-07-30 RX ORDER — LOSARTAN POTASSIUM AND HYDROCHLOROTHIAZIDE 12.5; 5 MG/1; MG/1
1 TABLET ORAL DAILY
Qty: 90 TABLET | Refills: 0 | Status: SHIPPED | OUTPATIENT
Start: 2018-07-30 | End: 2018-10-29

## 2018-07-30 RX ORDER — TADALAFIL 20 MG/1
20 TABLET ORAL
Qty: 10 TABLET | Refills: 2 | Status: SHIPPED | OUTPATIENT
Start: 2018-07-30 | End: 2018-07-30

## 2018-07-30 NOTE — TELEPHONE ENCOUNTER
Pt called and left message that should have labs and 3 month f/u appointment with Dr. Dana Nunez. Instructed to call [phone] to scheduled port flushes,labs and MD follow up appointment.

## 2018-07-31 NOTE — PROGRESS NOTES
CC: Annual Physical Exam    HPI:   Reina Ramirez is a 63year old male who presents for a complete physical exam. Pt denies any acute complaints at this time.     Wt Readings from Last 6 Encounters:  07/30/18 : 190 lb  07/24/18 : 191 lb  04/25/18 : 193 Negative mg/dL   Glucose Urine Negative Negative mg/dL   Ketones Urine Negative Negative mg/dL   Bilirubin Urine Negative Negative   Blood Urine Negative Negative   Nitrite Urine Negative Negative   Urobilinogen Urine <2.0 <2.0   Leukocyte Esterase Urine N Disease Father    • Breast Cancer Sister 36      Social History:  Smoking status: Former Smoker                                                              Packs/day: 1.00      Years: 35.00     Smokeless tobacco: Never Used                      Comment: q from the following:    Height as of this encounter: 70\". Weight as of this encounter: 190 lb. Vital signs reviewed. Appears stated age, well groomed.   Physical Exam:  GEN:  Patient is alert, awake and oriented, well developed, well nourished, no appar with pt.    2. Controlled type 2 diabetes mellitus without complication, without long-term current use of insulin (HCC)  - Latest Hgb A1c controlled @ 6.4.  - Actos d/c'ed and refill for Metformin given.   - Will refer to Dr. Dony Rodney (Ophthalmology) for [facility] 27.0-27.9,adult  - Pt's Body mass index is 27.26 kg/m², recommended low fat diet and aerobic exercise 30 minutes three times weekly. The patient indicates understanding of these issues and agrees to the plan.   The patient is asked to return in 3 months

## 2018-07-31 NOTE — PATIENT INSTRUCTIONS
Pautas de prevención para hombres de 48 a 59 años de [address] [facility] de detección y las vacunas son importantes para el manejo de muro imelda.  La consejería sobre muro imelda también es esencial. A continuación, verá pautas en relación con esos temas para ho [employer] de colesterol o triglicéridos [employer] hombres de concepcion margie de edad Al menos cada nasim años   VIH Los hombres con mayor riesgo de infección; hable con muro proveedor de atención médica En los exámenes de rutina   Cáncer de pulmón Adultos entre Haemophilus influenzae Tipo B (HIB) Los hombres con mayor riesgo de infección; hable con muro proveedor de atención médica [place] a yumiko dosis   Gripe (flu) [employer] hombres de [place] margie de [address] [facility], [address], paperas y Idelia Brochure (“MMR” por ruba sig Date Last Reviewed: 3/30/2015  © 3304-3123 [employer] [address]. [address] [facility], [facility] [place]. All rights reserved. This information is not intended as a substitute for professional medical care.  Always follow your healthcare professional

## 2018-08-15 ENCOUNTER — APPOINTMENT (OUTPATIENT)
Dept: HEMATOLOGY/ONCOLOGY | Facility: HOSPITAL | Age: 63
End: 2018-08-15
Attending: INTERNAL MEDICINE
Payer: COMMERCIAL

## 2018-08-25 PROBLEM — H25.9 AGE-RELATED CATARACT OF BOTH EYES: Status: ACTIVE | Noted: 2018-08-23

## 2018-08-25 PROBLEM — Z00.01 ENCOUNTER FOR ROUTINE ADULT HEALTH EXAMINATION WITH ABNORMAL FINDINGS: Status: RESOLVED | Noted: 2017-07-18 | Resolved: 2018-08-25

## 2018-08-25 PROBLEM — H04.123 DRY EYE SYNDROME OF BOTH EYES: Status: ACTIVE | Noted: 2018-08-23

## 2018-09-12 ENCOUNTER — OFFICE VISIT (OUTPATIENT)
Dept: GASTROENTEROLOGY | Facility: CLINIC | Age: 63
End: 2018-09-12

## 2018-09-12 ENCOUNTER — TELEPHONE (OUTPATIENT)
Dept: GASTROENTEROLOGY | Facility: CLINIC | Age: 63
End: 2018-09-12

## 2018-09-12 VITALS
SYSTOLIC BLOOD PRESSURE: 137 MMHG | WEIGHT: 194.81 LBS | HEART RATE: 56 BPM | HEIGHT: 70 IN | BODY MASS INDEX: 27.89 KG/M2 | DIASTOLIC BLOOD PRESSURE: 83 MMHG

## 2018-09-12 DIAGNOSIS — D64.9 NORMOCYTIC ANEMIA: ICD-10-CM

## 2018-09-12 DIAGNOSIS — Z12.11 COLON CANCER SCREENING: Primary | ICD-10-CM

## 2018-09-12 DIAGNOSIS — Z12.11 ENCOUNTER FOR SCREENING COLONOSCOPY: Primary | ICD-10-CM

## 2018-09-12 PROCEDURE — 99212 OFFICE O/P EST SF 10 MIN: CPT | Performed by: PHYSICIAN ASSISTANT

## 2018-09-12 PROCEDURE — 99243 OFF/OP CNSLTJ NEW/EST LOW 30: CPT | Performed by: PHYSICIAN ASSISTANT

## 2018-09-12 RX ORDER — POLYETHYLENE GLYCOL 3350, SODIUM CHLORIDE, SODIUM BICARBONATE, POTASSIUM CHLORIDE 420; 11.2; 5.72; 1.48 G/4L; G/4L; G/4L; G/4L
POWDER, FOR SOLUTION ORAL
Qty: 1 BOTTLE | Refills: 0 | Status: SHIPPED | OUTPATIENT
Start: 2018-09-12 | End: 2019-05-28 | Stop reason: ALTCHOICE

## 2018-09-12 NOTE — PATIENT INSTRUCTIONS
1. Schedule colonoscopy with Dr. Precious Abarca or Lewis Dow on a Saturday with IV twilight sedation. 2.  bowel prep from pharmacy - I have prescribed Trilyte split dose preparation,    3. HOLD Metformin the day before and day of the procedure.  Continue all o

## 2018-09-12 NOTE — TELEPHONE ENCOUNTER
Scheduled for:  Colonoscopy [address]  Provider Name:   Date:  12/15/18  Location:  [facility]  Sedation:  Ivcs  Time:  0900 / Arrival 0800  Prep: Split dose Trilyte  Meds/Allergies Reconciled?:  Physician reviewed  Diagnosis with codes:  Colon Ca. Scr.Z12.11 ,

## 2018-09-12 NOTE — H&P
[address] [facility] [address] Gastroenterology                                                                                                  Clinic History and Physical     Pa diabetic retinopathy in both eyes    • Portacath in place 11/4/2016   • Prostate cancer ([facility] [address] 07/18/2015    low volume, Crescencio 3+3, Dx by Dr. Savana Vanegas.  on observation by Urology      Past Surgical History:  2008: COLONOSCOPY      Comment:  Fabián  No date: HPI  GENITOURINARY:  negative for dysuria or gross hematuria  INTEGUMENT/BREAST:  SKIN:  negative for jaundice   ALLERGIC/IMMUNOLOGIC:  negative for hay fever  ENDOCRINE:  negative for cold intolerance and heat intolerance  MUSCULOSKELETAL:  negative for j screening + check labs. If MARY --> consider upper endoscopy.     # Hx DLBCL: per Dr. Yue Arias    # Elevated PSA: follow up with PCP    Recommend:  -Schedule colonoscopy w/ Dr. Maninder Márquez or Dr. Kat Oakley with IV twilight sedation   -Prep: Trilyte split dose preparation

## 2018-10-01 ENCOUNTER — OFFICE VISIT (OUTPATIENT)
Dept: SURGERY | Facility: CLINIC | Age: 63
End: 2018-10-01

## 2018-10-01 ENCOUNTER — TELEPHONE (OUTPATIENT)
Dept: SURGERY | Facility: CLINIC | Age: 63
End: 2018-10-01

## 2018-10-01 VITALS
WEIGHT: 194 LBS | TEMPERATURE: 98 F | HEART RATE: 64 BPM | BODY MASS INDEX: 27.77 KG/M2 | SYSTOLIC BLOOD PRESSURE: 143 MMHG | DIASTOLIC BLOOD PRESSURE: 79 MMHG | HEIGHT: 70 IN | RESPIRATION RATE: 16 BRPM

## 2018-10-01 DIAGNOSIS — Z92.3 HISTORY OF RADIATION THERAPY: ICD-10-CM

## 2018-10-01 DIAGNOSIS — C85.99 LYMPHOMA OF TESTIS (HCC): ICD-10-CM

## 2018-10-01 DIAGNOSIS — C61 PROSTATE CANCER (HCC): Primary | ICD-10-CM

## 2018-10-01 DIAGNOSIS — R35.1 NOCTURIA: ICD-10-CM

## 2018-10-01 DIAGNOSIS — N40.1 BENIGN PROSTATIC HYPERPLASIA WITH URINARY HESITANCY: ICD-10-CM

## 2018-10-01 DIAGNOSIS — N40.2 PROSTATE NODULE: ICD-10-CM

## 2018-10-01 DIAGNOSIS — Z87.891 FORMER SMOKER: ICD-10-CM

## 2018-10-01 DIAGNOSIS — R39.11 BENIGN PROSTATIC HYPERPLASIA WITH URINARY HESITANCY: ICD-10-CM

## 2018-10-01 DIAGNOSIS — R97.20 ELEVATED PSA: ICD-10-CM

## 2018-10-01 DIAGNOSIS — N52.9 ERECTILE DYSFUNCTION, UNSPECIFIED ERECTILE DYSFUNCTION TYPE: ICD-10-CM

## 2018-10-01 DIAGNOSIS — Z90.79 HISTORY OF UNILATERAL ORCHIECTOMY: ICD-10-CM

## 2018-10-01 PROCEDURE — 99244 OFF/OP CNSLTJ NEW/EST MOD 40: CPT | Performed by: UROLOGY

## 2018-10-01 PROCEDURE — 99212 OFFICE O/P EST SF 10 MIN: CPT | Performed by: UROLOGY

## 2018-10-01 NOTE — TELEPHONE ENCOUNTER
Doctor Ab Gibbs am evaluating Dread Barnhart because of his prostate cancer; urinating difficulties; erectile dysfunction. Available information indicates that his prostate biopsy was a few years or several years ago by Dr. Stevie Oliva that he is on observation therapy for the prostate cancer. Standard would be to repeat a prostate biopsy for any evidence of progression of disease and to re-confirm that observation is still appropriate. However, as you well know he has had stage IV DLBCL with testicular involvement in your note of 7/24/18 refers to \"aggressive nature of the disease\". It would appear that in light of all of the above, that observation of his prostate cancer by continuing PSA follow-ups would be adequate and that we should perhaps hold off from restaging biopsy of his prostate cancer . .. Would that be reasonable from your perspective? Patient states that he does have an appointment to see you next month.   Many thanks,  Carmen Gimenez, urology

## 2018-10-01 NOTE — PROGRESS NOTES
Carmel Ferraro is a 66year old male.     HPI:   Patient presents with:  elevated psa  Urinary Symptoms (urologic): pt is on tamsulosin 1 tablet daily has been on for 5 months   Erectile Dysfuntion: pt is on cialis 20mg states only has tried the med 1 t hesitancy, on tamsulosin 0.4 mg; history of prostate cancer; follow up with Dr. Becky Sharp; history of testicular cancer; right testicle removes; Erectile dysfunction on Cialis 20 mg  07/24/2018 Dr. Tiffani St, history of b-cell lymphoma; s/p cycle 6 RCHOP/RT; jodi Medications:  PEG 3350-KCl-Na Bicarb-NaCl (TRILYTE) 420 g Oral Recon Soln Split dose preparation - take as directed. Disp: 1 Bottle Rfl: 0   MetFORMIN HCl  MG Oral Tablet 24 Hr Take 1 tablet (500 mg total) by mouth 2 (two) times daily with meals.  Dis bilaterally  Head/Face: normocephalic  Eyes/Vision: no scleral icterus  Neck/Thyroid: neck is supple without adenopathy  Lymphatic: no abnormal cervical, supraclavicular or inguinal  adenopathy is noted  Respiratory: normal to inspection lungs are clear to 07/28/2018 PSA = 4.2 compared to 06/24/2017 PSA = 3.0.  On DOLLY, right lobe larger than left; soft; slight 1/4+ induration on the right lateral edge I explained to the pt that the status is unknown and standard procedure is to repeat prostate biopsy to deter Nocturia  His American urologic Association voiding score is  23,  severe  voiding dysfunction category. He is currently taking tamsulosin 0.4 mg which he does not feel improves the problem.  I fully explained to patient the benefits, risks, complications, you informed of  all significant urological findings and developments. Thank you once again,  Dr. Beather Cooks, M.D.               Orders This Visit:  Orders Placed This Encounter      PSA - DIAGNOSTIC      Testosterone,Free And Total      Urinalys

## 2018-10-01 NOTE — PATIENT INSTRUCTIONS
1.  Continue tamsulosin 0.4 mg daily    2. Bladder ultrasound  --check to see how well you are emptying your bladder    3.   Please get blood draw for PSA (to reevaluate the status of your prostate cancer), testosterone--free and total (the level of the m

## 2018-10-15 ENCOUNTER — TELEPHONE (OUTPATIENT)
Dept: GASTROENTEROLOGY | Facility: CLINIC | Age: 63
End: 2018-10-15

## 2018-10-15 NOTE — TELEPHONE ENCOUNTER
Left voicemail and Mailed letter to patient notifying him of overdue labs CBC w/Differential w/Platelet, Ferritin, Iron &Tibc ,Ordered 09/12/18  and due 10/12/18 . Overdue letter mailed to patient.

## 2018-10-22 ENCOUNTER — TELEPHONE (OUTPATIENT)
Dept: GASTROENTEROLOGY | Facility: CLINIC | Age: 63
End: 2018-10-22

## 2018-10-22 DIAGNOSIS — D64.9 ANEMIA, UNSPECIFIED TYPE: ICD-10-CM

## 2018-10-22 DIAGNOSIS — Z12.11 SPECIAL SCREENING FOR MALIGNANT NEOPLASMS, COLON: Primary | ICD-10-CM

## 2018-10-22 NOTE — TELEPHONE ENCOUNTER
Pt cancelling CLN octavio 12/15/18, pt will cb when he returns from [facility] to [place] [id number], thanks.   *vikash gutierrez

## 2018-10-23 ENCOUNTER — NURSE ONLY (OUTPATIENT)
Dept: HEMATOLOGY/ONCOLOGY | Facility: HOSPITAL | Age: 63
End: 2018-10-23
Attending: INTERNAL MEDICINE
Payer: COMMERCIAL

## 2018-10-23 VITALS
SYSTOLIC BLOOD PRESSURE: 133 MMHG | RESPIRATION RATE: 16 BRPM | HEIGHT: 70 IN | WEIGHT: 193 LBS | DIASTOLIC BLOOD PRESSURE: 74 MMHG | HEART RATE: 67 BPM | TEMPERATURE: 98 F | BODY MASS INDEX: 27.63 KG/M2

## 2018-10-23 DIAGNOSIS — Z85.46 HISTORY OF PROSTATE CANCER: ICD-10-CM

## 2018-10-23 DIAGNOSIS — C85.99 LYMPHOMA OF TESTIS (HCC): ICD-10-CM

## 2018-10-23 DIAGNOSIS — Z85.72 HISTORY OF B-CELL LYMPHOMA: ICD-10-CM

## 2018-10-23 DIAGNOSIS — C83.39 DIFFUSE LARGE B-CELL LYMPHOMA OF SOLID ORGAN EXCLUDING SPLEEN (HCC): Primary | ICD-10-CM

## 2018-10-23 DIAGNOSIS — C83.30 DLBCL (DIFFUSE LARGE B CELL LYMPHOMA) (HCC): ICD-10-CM

## 2018-10-23 DIAGNOSIS — Z95.828 PORT-A-CATH IN PLACE: Primary | ICD-10-CM

## 2018-10-23 PROCEDURE — 80053 COMPREHEN METABOLIC PANEL: CPT

## 2018-10-23 PROCEDURE — 83615 LACTATE (LD) (LDH) ENZYME: CPT

## 2018-10-23 PROCEDURE — A4216 STERILE WATER/SALINE, 10 ML: HCPCS

## 2018-10-23 PROCEDURE — 36591 DRAW BLOOD OFF VENOUS DEVICE: CPT

## 2018-10-23 PROCEDURE — 85025 COMPLETE CBC W/AUTO DIFF WBC: CPT

## 2018-10-23 PROCEDURE — 99213 OFFICE O/P EST LOW 20 MIN: CPT | Performed by: INTERNAL MEDICINE

## 2018-10-23 RX ORDER — HEPARIN SODIUM (PORCINE) LOCK FLUSH IV SOLN 100 UNIT/ML 100 UNIT/ML
SOLUTION INTRAVENOUS
Status: COMPLETED
Start: 2018-10-23 | End: 2018-10-23

## 2018-10-23 RX ORDER — HEPARIN SODIUM (PORCINE) LOCK FLUSH IV SOLN 100 UNIT/ML 100 UNIT/ML
5 SOLUTION INTRAVENOUS ONCE
Status: CANCELLED | OUTPATIENT
Start: 2018-10-23

## 2018-10-23 RX ORDER — 0.9 % SODIUM CHLORIDE 0.9 %
10 VIAL (ML) INJECTION ONCE
Status: CANCELLED | OUTPATIENT
Start: 2018-10-23

## 2018-10-23 RX ORDER — HEPARIN SODIUM (PORCINE) LOCK FLUSH IV SOLN 100 UNIT/ML 100 UNIT/ML
5 SOLUTION INTRAVENOUS ONCE
Status: COMPLETED | OUTPATIENT
Start: 2018-10-23 | End: 2018-10-23

## 2018-10-23 RX ORDER — 0.9 % SODIUM CHLORIDE 0.9 %
VIAL (ML) INJECTION
Status: DISCONTINUED
Start: 2018-10-23 | End: 2018-10-23

## 2018-10-23 RX ADMIN — HEPARIN SODIUM (PORCINE) LOCK FLUSH IV SOLN 100 UNIT/ML 500 UNITS: 100 SOLUTION INTRAVENOUS at 16:24:00

## 2018-10-23 NOTE — PROGRESS NOTES
MIK       Ade Anderson is a 66year old male here for follow up of Diffuse large b-cell lymphoma of solid organ excluding spleen (hcc)  (primary encounter diagnosis)     S/p Cycle 6 RCHOP/ RT completed:   The patient was treated to the contralateral Fenofibrate 145 MG Oral Tab Take 1 tablet (145 mg total) by mouth daily. Disp: 90 tablet Rfl: 3   tamsulosin HCl 0.4 MG Oral Cap Take 1 capsule (0.4 mg total) by mouth daily.  Disp: 90 capsule Rfl: 3   Tadalafil (CIALIS) 20 MG Oral Tab Take 1 tablet (20 m Service: Not Asked        Blood Transfusions: Not Asked        Caffeine Concern: Yes          Coffee        Occupational Exposure: Not Asked        Hobby Hazards: Not Asked        Sleep Concern: Not Asked        Stress Concern: Not Asked        Weight Conc range of motion. Lymphadenopathy:        Head (right side): No submental, no submandibular, no tonsillar, no preauricular, no posterior auricular and no occipital adenopathy present.         Head (left side): No submental, no submandibular, no preauricula now.      Yrs 3-5 labs q 6 mo, then yearly.  H+P each visit. Recommended flu shot yearly. Patient to have colonoscopy as last was 10 yrs ago. No orders of the defined types were placed in this encounter.       Results From Past 48 Hours:  Keith Blount encounter. PROCEDURE:  CT CHEST ABDOMEN PELVIS (ALL CONTRAST ONLY) (CPT=71260/73677)     COMPARISON: [facility], [facility], CT CHEST+ABDOMEN+PELVIS(ALL CNTRST ONLY)(CPT=71260/20114), 12/29/2016, 10:14.   [facility], CT CHEST to 1.1 cm also   not significantly changed. These are favored to represent hemangiomas. BILIARY:            The gallbladder is present. No intra-or extrahepatic bile duct dilatation. SPLEEN:           Normal size.     PANCREAS:      No fluid collection

## 2018-10-25 ENCOUNTER — HOSPITAL ENCOUNTER (OUTPATIENT)
Dept: ULTRASOUND IMAGING | Facility: HOSPITAL | Age: 63
Discharge: HOME | End: 2018-10-25
Attending: UROLOGY
Payer: COMMERCIAL

## 2018-10-25 DIAGNOSIS — N40.1 BENIGN PROSTATIC HYPERPLASIA WITH URINARY HESITANCY: ICD-10-CM

## 2018-10-25 DIAGNOSIS — R39.11 BENIGN PROSTATIC HYPERPLASIA WITH URINARY HESITANCY: ICD-10-CM

## 2018-10-25 DIAGNOSIS — R35.1 NOCTURIA: ICD-10-CM

## 2018-10-25 PROCEDURE — 76857 US EXAM PELVIC LIMITED: CPT | Performed by: UROLOGY

## 2018-10-27 ENCOUNTER — LAB ENCOUNTER (OUTPATIENT)
Dept: LAB | Facility: HOSPITAL | Age: 63
End: 2018-10-27
Payer: COMMERCIAL

## 2018-10-27 DIAGNOSIS — N52.9 ERECTILE DYSFUNCTION, UNSPECIFIED ERECTILE DYSFUNCTION TYPE: ICD-10-CM

## 2018-10-27 DIAGNOSIS — Z90.79 HISTORY OF UNILATERAL ORCHIECTOMY: ICD-10-CM

## 2018-10-27 DIAGNOSIS — Z12.11 ENCOUNTER FOR SCREENING COLONOSCOPY: ICD-10-CM

## 2018-10-27 DIAGNOSIS — N40.1 BENIGN PROSTATIC HYPERPLASIA WITH URINARY HESITANCY: ICD-10-CM

## 2018-10-27 DIAGNOSIS — C85.99 LYMPHOMA OF TESTIS (HCC): ICD-10-CM

## 2018-10-27 DIAGNOSIS — C61 PROSTATE CANCER (HCC): ICD-10-CM

## 2018-10-27 DIAGNOSIS — D64.9 NORMOCYTIC ANEMIA: ICD-10-CM

## 2018-10-27 DIAGNOSIS — E11.9 CONTROLLED TYPE 2 DIABETES MELLITUS WITHOUT COMPLICATION, WITHOUT LONG-TERM CURRENT USE OF INSULIN (HCC): ICD-10-CM

## 2018-10-27 DIAGNOSIS — R35.1 NOCTURIA: ICD-10-CM

## 2018-10-27 DIAGNOSIS — R39.11 BENIGN PROSTATIC HYPERPLASIA WITH URINARY HESITANCY: ICD-10-CM

## 2018-10-27 PROCEDURE — 83540 ASSAY OF IRON: CPT

## 2018-10-27 PROCEDURE — 80053 COMPREHEN METABOLIC PANEL: CPT

## 2018-10-27 PROCEDURE — 85025 COMPLETE CBC W/AUTO DIFF WBC: CPT

## 2018-10-27 PROCEDURE — 36415 COLL VENOUS BLD VENIPUNCTURE: CPT

## 2018-10-27 PROCEDURE — 84403 ASSAY OF TOTAL TESTOSTERONE: CPT

## 2018-10-27 PROCEDURE — 84153 ASSAY OF PSA TOTAL: CPT

## 2018-10-27 PROCEDURE — 83036 HEMOGLOBIN GLYCOSYLATED A1C: CPT

## 2018-10-27 PROCEDURE — 81001 URINALYSIS AUTO W/SCOPE: CPT

## 2018-10-27 PROCEDURE — 82728 ASSAY OF FERRITIN: CPT

## 2018-10-27 PROCEDURE — 84466 ASSAY OF TRANSFERRIN: CPT

## 2018-10-27 PROCEDURE — 84402 ASSAY OF FREE TESTOSTERONE: CPT

## 2018-10-28 DIAGNOSIS — R31.29 MICROHEMATURIA: Primary | ICD-10-CM

## 2018-10-29 ENCOUNTER — OFFICE VISIT (OUTPATIENT)
Dept: FAMILY MEDICINE CLINIC | Facility: CLINIC | Age: 63
End: 2018-10-29

## 2018-10-29 ENCOUNTER — TELEPHONE (OUTPATIENT)
Dept: SURGERY | Facility: CLINIC | Age: 63
End: 2018-10-29

## 2018-10-29 ENCOUNTER — HOSPITAL ENCOUNTER (OUTPATIENT)
Dept: GENERAL RADIOLOGY | Facility: HOSPITAL | Age: 63
Discharge: HOME | End: 2018-10-29
Payer: COMMERCIAL

## 2018-10-29 ENCOUNTER — TELEPHONE (OUTPATIENT)
Dept: GASTROENTEROLOGY | Facility: CLINIC | Age: 63
End: 2018-10-29

## 2018-10-29 VITALS
HEART RATE: 65 BPM | BODY MASS INDEX: 27 KG/M2 | DIASTOLIC BLOOD PRESSURE: 80 MMHG | OXYGEN SATURATION: 97 % | WEIGHT: 191 LBS | SYSTOLIC BLOOD PRESSURE: 128 MMHG

## 2018-10-29 DIAGNOSIS — Z23 NEED FOR INFLUENZA VACCINATION: ICD-10-CM

## 2018-10-29 DIAGNOSIS — G89.29 CHRONIC LEFT HIP PAIN: Primary | ICD-10-CM

## 2018-10-29 DIAGNOSIS — G89.29 CHRONIC LEFT HIP PAIN: ICD-10-CM

## 2018-10-29 DIAGNOSIS — M25.552 CHRONIC LEFT HIP PAIN: Primary | ICD-10-CM

## 2018-10-29 DIAGNOSIS — M25.552 CHRONIC LEFT HIP PAIN: ICD-10-CM

## 2018-10-29 DIAGNOSIS — IMO0001 UNCONTROLLED TYPE 2 DIABETES MELLITUS WITHOUT COMPLICATION, WITHOUT LONG-TERM CURRENT USE OF INSULIN: ICD-10-CM

## 2018-10-29 DIAGNOSIS — I10 ESSENTIAL HYPERTENSION: ICD-10-CM

## 2018-10-29 PROCEDURE — 90686 IIV4 VACC NO PRSV 0.5 ML IM: CPT

## 2018-10-29 PROCEDURE — 73502 X-RAY EXAM HIP UNI 2-3 VIEWS: CPT

## 2018-10-29 PROCEDURE — 90471 IMMUNIZATION ADMIN: CPT

## 2018-10-29 PROCEDURE — 99214 OFFICE O/P EST MOD 30 MIN: CPT

## 2018-10-29 RX ORDER — LOSARTAN POTASSIUM AND HYDROCHLOROTHIAZIDE 12.5; 5 MG/1; MG/1
1 TABLET ORAL DAILY
Qty: 90 TABLET | Refills: 0 | Status: SHIPPED | OUTPATIENT
Start: 2018-10-29 | End: 2019-01-31

## 2018-10-29 RX ORDER — METFORMIN HYDROCHLORIDE 500 MG/1
500 TABLET, EXTENDED RELEASE ORAL 2 TIMES DAILY WITH MEALS
Qty: 180 TABLET | Refills: 0 | Status: SHIPPED | OUTPATIENT
Start: 2018-10-29 | End: 2019-02-18

## 2018-10-29 RX ORDER — METHYLPREDNISOLONE 4 MG/1
TABLET ORAL
Qty: 1 KIT | Refills: 0 | Status: ON HOLD | OUTPATIENT
Start: 2018-10-29 | End: 2019-01-22 | Stop reason: ALTCHOICE

## 2018-10-29 RX ORDER — NAPROXEN 500 MG/1
500 TABLET ORAL 2 TIMES DAILY WITH MEALS
Qty: 60 TABLET | Refills: 0 | Status: ON HOLD | OUTPATIENT
Start: 2018-10-29 | End: 2019-01-22

## 2018-10-29 NOTE — TELEPHONE ENCOUNTER
GI RNs, please let Mr. Brionna Reyes know that he is not iron deficient anemic. He should continue to follow up with Dada Chen and Arthur (heme/onc). Finally, if he is back from [facility], schedule a colonoscopy per previous orders as he is overdue for c-scope.  He

## 2018-10-29 NOTE — TELEPHONE ENCOUNTER
----- Message from Hipolito Chapman MD sent at 10/28/2018  6:01 PM CDT -----  Nurses, please call patient;  Please notify him that 10/27/18 urinalysis urine test showed 3 red blood cells per high-power field with hospital stating that is the upper limit o

## 2018-10-30 PROBLEM — G89.29 CHRONIC LEFT HIP PAIN: Status: ACTIVE | Noted: 2018-10-30

## 2018-10-30 PROBLEM — Z23 NEED FOR INFLUENZA VACCINATION: Status: ACTIVE | Noted: 2018-10-30

## 2018-10-30 PROBLEM — M25.552 CHRONIC LEFT HIP PAIN: Status: ACTIVE | Noted: 2018-10-30

## 2018-10-30 NOTE — PATIENT INSTRUCTIONS
Vacunas antigripales para adultos  La gripe (abreviada flu en inglés) es akhil enfermedad causada por un virus de fácil transmisión. La vacuna antigripal (flu shot) ofrece protección contra la gripe.  Lo ideal es que usted se ponga esta vacuna todos los Wilfrid Hay muchas cepas (tipos) de virus de la gripe. Los expertos en medicina predicen cuáles cepas tienen mayores probabilidades de infectar a la gente todos los Los daisy; las vacunas antigripales se elaboran a partir de estas cepas.  Cuando le ponen akhil vacuna anti · Personas que viven en la misma casa (incluso niños) que alguien que está en un margie de alto riesgo  Tipos de vacuna contra la gripe  La vacuna antigripal está disponible en forma inyectable.  [place] proveedor de [facility] determinará cuál tipo es el ad

## 2018-10-30 NOTE — TELEPHONE ENCOUNTER
Spoke to pt, relayed [employer] as shown below. Pt states will be calling back once he returns from [facility] to reschedule his colonoscopy. Pt verbalized understanding of whole message and had no further questions at this time.

## 2018-10-30 NOTE — PROGRESS NOTES
HPI:    Patient ID: Melanie Chakraborty is a 66year old male. Hypertension   This is a chronic problem. Episode onset: few years ago. Progression since onset: stable. The problem is controlled.  Pertinent negatives include no anxiety, blurred vision, charly Pertinent negatives include no fever, inability to bear weight, itching, joint locking, joint swelling, limited range of motion, numbness, stiffness or tingling. The symptoms are aggravated by activity and standing. He has tried nothing for the symptoms.  H preparation - take as directed. Disp: 1 Bottle Rfl: 0   Fenofibrate 145 MG Oral Tab Take 1 tablet (145 mg total) by mouth daily. Disp: 90 tablet Rfl: 3   tamsulosin HCl 0.4 MG Oral Cap Take 1 capsule (0.4 mg total) by mouth daily.  Disp: 90 capsule Rfl: 3 VACCINE QUAD PRESERVATIVE FREE 0.5 ML    RTC if symptoms worsen or fail to improve and in 3 months for f/u of DM.       Orders Placed This Encounter      Comp Metabolic Panel (14) [E]      Hemoglobin A1C (Glycohemoglobin) [E]      Flulaval 6 months and olde

## 2018-10-31 ENCOUNTER — TELEPHONE (OUTPATIENT)
Dept: INTERNAL MEDICINE CLINIC | Facility: CLINIC | Age: 63
End: 2018-10-31

## 2018-10-31 NOTE — TELEPHONE ENCOUNTER
Left voicemail. XR results normal -- continue medications as prescribed. To call office back for further questions.

## 2018-10-31 NOTE — TELEPHONE ENCOUNTER
----- Message from Dalton Rey MD sent at 10/30/2018  9:36 AM CDT -----  Results unremarkable, no changes to current medications; ok to file.

## 2018-11-06 NOTE — TELEPHONE ENCOUNTER
LMTCB I tried pt work number was was disconnected twice. I am going to mail pt a letter with lab orders. While I was starting a letter, pt called back call was transferred to me I verified name and date of birth. I read pt Dr. Marialuisa Chavis results.  I als

## 2018-11-14 ENCOUNTER — APPOINTMENT (OUTPATIENT)
Dept: LAB | Facility: HOSPITAL | Age: 63
End: 2018-11-14
Attending: UROLOGY
Payer: COMMERCIAL

## 2018-11-14 DIAGNOSIS — R31.29 MICROHEMATURIA: ICD-10-CM

## 2018-11-14 PROCEDURE — 81003 URINALYSIS AUTO W/O SCOPE: CPT

## 2018-11-14 PROCEDURE — 88108 CYTOPATH CONCENTRATE TECH: CPT

## 2018-11-20 ENCOUNTER — OFFICE VISIT (OUTPATIENT)
Dept: SURGERY | Facility: CLINIC | Age: 63
End: 2018-11-20

## 2018-11-20 ENCOUNTER — TELEPHONE (OUTPATIENT)
Dept: SURGERY | Facility: CLINIC | Age: 63
End: 2018-11-20

## 2018-11-20 VITALS
HEIGHT: 71 IN | SYSTOLIC BLOOD PRESSURE: 134 MMHG | BODY MASS INDEX: 27.16 KG/M2 | DIASTOLIC BLOOD PRESSURE: 84 MMHG | WEIGHT: 194 LBS | HEART RATE: 54 BPM | TEMPERATURE: 98 F | OXYGEN SATURATION: 98 %

## 2018-11-20 DIAGNOSIS — Z92.3 HISTORY OF RADIATION THERAPY: ICD-10-CM

## 2018-11-20 DIAGNOSIS — N52.9 ERECTILE DYSFUNCTION, UNSPECIFIED ERECTILE DYSFUNCTION TYPE: ICD-10-CM

## 2018-11-20 DIAGNOSIS — C61 PROSTATE CANCER (HCC): Primary | ICD-10-CM

## 2018-11-20 DIAGNOSIS — N40.2 PROSTATE NODULE: ICD-10-CM

## 2018-11-20 DIAGNOSIS — R35.1 NOCTURIA: ICD-10-CM

## 2018-11-20 DIAGNOSIS — Z87.891 FORMER SMOKER: ICD-10-CM

## 2018-11-20 DIAGNOSIS — N40.1 BENIGN PROSTATIC HYPERPLASIA WITH URINARY HESITANCY: ICD-10-CM

## 2018-11-20 DIAGNOSIS — Z90.79 HISTORY OF UNILATERAL ORCHIECTOMY: ICD-10-CM

## 2018-11-20 DIAGNOSIS — R39.11 BENIGN PROSTATIC HYPERPLASIA WITH URINARY HESITANCY: ICD-10-CM

## 2018-11-20 DIAGNOSIS — R97.20 ELEVATED PSA: ICD-10-CM

## 2018-11-20 DIAGNOSIS — C85.99 LYMPHOMA OF TESTIS (HCC): ICD-10-CM

## 2018-11-20 PROCEDURE — 99212 OFFICE O/P EST SF 10 MIN: CPT | Performed by: UROLOGY

## 2018-11-20 PROCEDURE — 99214 OFFICE O/P EST MOD 30 MIN: CPT | Performed by: UROLOGY

## 2018-11-20 RX ORDER — TAMSULOSIN HYDROCHLORIDE 0.4 MG/1
0.4 CAPSULE ORAL DAILY
Qty: 90 CAPSULE | Refills: 3 | Status: SHIPPED | OUTPATIENT
Start: 2018-11-20 | End: 2018-12-20

## 2018-11-20 RX ORDER — CEFDINIR 300 MG/1
300 CAPSULE ORAL EVERY 12 HOURS
Qty: 6 CAPSULE | Refills: 0 | Status: SHIPPED | OUTPATIENT
Start: 2018-11-20 | End: 2018-11-23

## 2018-11-20 RX ORDER — CIPROFLOXACIN 500 MG/1
500 TABLET, FILM COATED ORAL 2 TIMES DAILY
Qty: 6 TABLET | Refills: 0 | Status: SHIPPED | OUTPATIENT
Start: 2018-11-20 | End: 2018-11-23

## 2018-11-20 NOTE — PATIENT INSTRUCTIONS
Pooja Salguero M.D. Pooja Salguero M.D.      1.  Restart TAMSULOSIN  0.4 mg daily; you had run out of the medication; I am electronically sending order.     2.  Proceed with plans for TRANS-UL

## 2019-01-14 ENCOUNTER — OFFICE VISIT (OUTPATIENT)
Dept: SURGERY | Facility: CLINIC | Age: 64
End: 2019-01-14

## 2019-01-14 VITALS
HEART RATE: 60 BPM | HEIGHT: 71 IN | WEIGHT: 194 LBS | SYSTOLIC BLOOD PRESSURE: 124 MMHG | BODY MASS INDEX: 27.16 KG/M2 | TEMPERATURE: 98 F | DIASTOLIC BLOOD PRESSURE: 80 MMHG

## 2019-01-14 DIAGNOSIS — C61 MALIGNANT NEOPLASM OF PROSTATE (HCC): ICD-10-CM

## 2019-01-14 DIAGNOSIS — C61 PROSTATE CANCER (HCC): Primary | ICD-10-CM

## 2019-01-14 PROCEDURE — 55700 BIOPSY OF PROSTATE,NEEDLE/PUNCH: CPT | Performed by: UROLOGY

## 2019-01-14 PROCEDURE — 76942 ECHO GUIDE FOR BIOPSY: CPT | Performed by: UROLOGY

## 2019-01-14 PROCEDURE — 76872 US TRANSRECTAL: CPT | Performed by: UROLOGY

## 2019-01-14 RX ORDER — CEFDINIR 300 MG/1
CAPSULE ORAL
Qty: 6 CAPSULE | Refills: 0 | OUTPATIENT
Start: 2019-01-14

## 2019-01-14 RX ORDER — CIPROFLOXACIN 500 MG/1
TABLET, FILM COATED ORAL
Qty: 6 TABLET | Refills: 0 | OUTPATIENT
Start: 2019-01-14

## 2019-01-14 NOTE — PROGRESS NOTES
Yvonne Patton is a 66year old male. PRE-OP:   Prostate Cancer: on observation therapy; Crescencio 3+3, first dx [id number] by Dr. Harley Sweet.   On DOLLY on 10/1/18, prostate 2+ enlarged, right lobe larger than left; soft; slight 1/4+ induration on the rig prostate; I also injected 1% Xylocaine into the mid aspect of the prostate tissue itself bilaterally. After the infiltrations were performed using [employer]am[employer] Biopty Gun and an automatic firing 18 gauge needle.      Cores were obtained from the medial and lateral biopsy is to make sure that your prostate cancer has become more aggressive during the last 5 years; we will let you know.       By signing my name below, Kirti Lamb,  attest that this documentation has been prepared under the direction and in the presenc

## 2019-01-14 NOTE — PATIENT INSTRUCTIONS
Eli Garcia M.D.      1. Finish your prescribed 3 day course of the 2 different antibiotics as directed. 2. No heavy lifting or strenuous physical activity for a few days. 3. No aspirin or NSAIDs for 24 hours.     4.

## 2019-01-14 NOTE — TELEPHONE ENCOUNTER
I called pt [facility] pharmacy and spoke with Erwin Dewitt who confirmed that pt picked up both cipro and cefdinir prescriptions up on 1/12/19.

## 2019-01-18 ENCOUNTER — TELEPHONE (OUTPATIENT)
Dept: SURGERY | Facility: CLINIC | Age: 64
End: 2019-01-18

## 2019-01-18 ENCOUNTER — LAB ENCOUNTER (OUTPATIENT)
Dept: LAB | Facility: HOSPITAL | Age: 64
End: 2019-01-18
Attending: UROLOGY
Payer: COMMERCIAL

## 2019-01-18 ENCOUNTER — OFFICE VISIT (OUTPATIENT)
Dept: SURGERY | Facility: CLINIC | Age: 64
End: 2019-01-18

## 2019-01-18 VITALS — DIASTOLIC BLOOD PRESSURE: 70 MMHG | HEART RATE: 76 BPM | SYSTOLIC BLOOD PRESSURE: 109 MMHG | TEMPERATURE: 98 F

## 2019-01-18 DIAGNOSIS — R50.9 FEVER, UNSPECIFIED FEVER CAUSE: ICD-10-CM

## 2019-01-18 DIAGNOSIS — C61 PROSTATE CANCER (HCC): ICD-10-CM

## 2019-01-18 DIAGNOSIS — R35.0 URINARY FREQUENCY: ICD-10-CM

## 2019-01-18 DIAGNOSIS — R68.83 CHILLS: ICD-10-CM

## 2019-01-18 DIAGNOSIS — R35.0 URINARY FREQUENCY: Primary | ICD-10-CM

## 2019-01-18 DIAGNOSIS — R35.1 NOCTURIA: ICD-10-CM

## 2019-01-18 DIAGNOSIS — R30.0 DYSURIA: Primary | ICD-10-CM

## 2019-01-18 DIAGNOSIS — R19.8 PAIN WITH BOWEL MOVEMENTS: ICD-10-CM

## 2019-01-18 DIAGNOSIS — N40.1 BENIGN PROSTATIC HYPERPLASIA WITH URINARY FREQUENCY: ICD-10-CM

## 2019-01-18 DIAGNOSIS — R39.89 SUSPECTED UTI: ICD-10-CM

## 2019-01-18 DIAGNOSIS — R35.0 BENIGN PROSTATIC HYPERPLASIA WITH URINARY FREQUENCY: ICD-10-CM

## 2019-01-18 DIAGNOSIS — N30.00 ACUTE CYSTITIS WITHOUT HEMATURIA: ICD-10-CM

## 2019-01-18 DIAGNOSIS — IMO0001 UNCONTROLLED TYPE 2 DIABETES MELLITUS WITHOUT COMPLICATION, WITHOUT LONG-TERM CURRENT USE OF INSULIN: ICD-10-CM

## 2019-01-18 LAB
ALBUMIN SERPL BCP-MCNC: 3.9 G/DL (ref 3.5–4.8)
ALBUMIN/GLOB SERPL: 1.1 {RATIO} (ref 1–2)
ALP SERPL-CCNC: 51 U/L (ref 32–100)
ALT SERPL-CCNC: 59 U/L (ref 17–63)
ANION GAP SERPL CALC-SCNC: 13 MMOL/L (ref 0–18)
AST SERPL-CCNC: 46 U/L (ref 15–41)
BACTERIA UR QL AUTO: NEGATIVE /HPF
BASOPHILS # BLD: 0 K/UL (ref 0–0.2)
BASOPHILS NFR BLD: 1 %
BILIRUB SERPL-MCNC: 0.8 MG/DL (ref 0.3–1.2)
BILIRUB UR QL: NEGATIVE
BUN SERPL-MCNC: 19 MG/DL (ref 8–20)
BUN/CREAT SERPL: 19 (ref 10–20)
CALCIUM SERPL-MCNC: 9.4 MG/DL (ref 8.5–10.5)
CHLORIDE SERPL-SCNC: 97 MMOL/L (ref 95–110)
CO2 SERPL-SCNC: 21 MMOL/L (ref 22–32)
COLOR UR: YELLOW
CREAT SERPL-MCNC: 1 MG/DL (ref 0.5–1.5)
EOSINOPHIL # BLD: 0.1 K/UL (ref 0–0.7)
EOSINOPHIL NFR BLD: 1 %
ERYTHROCYTE [DISTWIDTH] IN BLOOD BY AUTOMATED COUNT: 13.3 % (ref 11–15)
EST. AVERAGE GLUCOSE BLD GHB EST-MCNC: 189 MG/DL (ref 68–126)
GLOBULIN PLAS-MCNC: 3.7 G/DL (ref 2.5–3.7)
GLUCOSE SERPL-MCNC: 231 MG/DL (ref 70–99)
GLUCOSE UR-MCNC: 50 MG/DL
HBA1C MFR BLD HPLC: 8.2 % (ref ?–5.7)
HCT VFR BLD AUTO: 38.6 % (ref 41–52)
HGB BLD-MCNC: 13.4 G/DL (ref 13.5–17.5)
KETONES UR-MCNC: NEGATIVE MG/DL
LYMPHOCYTES # BLD: 1.5 K/UL (ref 1–4)
LYMPHOCYTES NFR BLD: 20 %
MCH RBC QN AUTO: 33.4 PG (ref 27–32)
MCHC RBC AUTO-ENTMCNC: 34.8 G/DL (ref 32–37)
MCV RBC AUTO: 96.2 FL (ref 80–100)
MONOCYTES # BLD: 0.5 K/UL (ref 0–1)
MONOCYTES NFR BLD: 6 %
NEUTROPHILS # BLD AUTO: 5.3 K/UL (ref 1.8–7.7)
NEUTROPHILS NFR BLD: 73 %
NITRITE UR QL STRIP.AUTO: NEGATIVE
OSMOLALITY UR CALC.SUM OF ELEC: 282 MOSM/KG (ref 275–295)
PATIENT FASTING: NO
PH UR: 5 [PH] (ref 5–8)
PLATELET # BLD AUTO: 127 K/UL (ref 140–400)
PMV BLD AUTO: 9.8 FL (ref 7.4–10.3)
POTASSIUM SERPL-SCNC: 3.6 MMOL/L (ref 3.3–5.1)
PROT SERPL-MCNC: 7.6 G/DL (ref 5.9–8.4)
PROT UR-MCNC: 100 MG/DL
RBC # BLD AUTO: 4.01 M/UL (ref 4.5–5.9)
RBC #/AREA URNS AUTO: 246 /HPF
SODIUM SERPL-SCNC: 131 MMOL/L (ref 136–144)
SP GR UR STRIP: 1.02 (ref 1–1.03)
UROBILINOGEN UR STRIP-ACNC: <2
VIT C UR-MCNC: NEGATIVE MG/DL
WBC # BLD AUTO: 7.4 K/UL (ref 4–11)
WBC #/AREA URNS AUTO: 562 /HPF

## 2019-01-18 PROCEDURE — 87186 SC STD MICRODIL/AGAR DIL: CPT

## 2019-01-18 PROCEDURE — 96402 CHEMO HORMON ANTINEOPL SQ/IM: CPT | Performed by: UROLOGY

## 2019-01-18 PROCEDURE — 87088 URINE BACTERIA CULTURE: CPT

## 2019-01-18 PROCEDURE — 36415 COLL VENOUS BLD VENIPUNCTURE: CPT

## 2019-01-18 PROCEDURE — 80053 COMPREHEN METABOLIC PANEL: CPT

## 2019-01-18 PROCEDURE — 83036 HEMOGLOBIN GLYCOSYLATED A1C: CPT

## 2019-01-18 PROCEDURE — 81001 URINALYSIS AUTO W/SCOPE: CPT

## 2019-01-18 PROCEDURE — 99212 OFFICE O/P EST SF 10 MIN: CPT | Performed by: UROLOGY

## 2019-01-18 PROCEDURE — 85025 COMPLETE CBC W/AUTO DIFF WBC: CPT

## 2019-01-18 PROCEDURE — 87086 URINE CULTURE/COLONY COUNT: CPT

## 2019-01-18 PROCEDURE — 99215 OFFICE O/P EST HI 40 MIN: CPT | Performed by: UROLOGY

## 2019-01-18 RX ORDER — CEFTRIAXONE 1 G/1
1000 INJECTION, POWDER, FOR SOLUTION INTRAMUSCULAR; INTRAVENOUS ONCE
Status: COMPLETED | OUTPATIENT
Start: 2019-01-18 | End: 2019-01-18

## 2019-01-18 RX ORDER — SULFAMETHOXAZOLE AND TRIMETHOPRIM 800; 160 MG/1; MG/1
1 TABLET ORAL 2 TIMES DAILY
Qty: 20 TABLET | Refills: 0 | Status: ON HOLD | OUTPATIENT
Start: 2019-01-18 | End: 2019-01-22 | Stop reason: ALTCHOICE

## 2019-01-18 RX ORDER — TAMSULOSIN HYDROCHLORIDE 0.4 MG/1
0.8 CAPSULE ORAL DAILY
Qty: 180 CAPSULE | Refills: 3 | Status: SHIPPED | OUTPATIENT
Start: 2019-01-18 | End: 2019-08-29

## 2019-01-18 RX ADMIN — CEFTRIAXONE 1000 MG: 1 INJECTION, POWDER, FOR SOLUTION INTRAMUSCULAR; INTRAVENOUS at 17:18:00

## 2019-01-18 NOTE — PATIENT INSTRUCTIONS
Joanne Jones M.D.      1. Ceftriaxone/Rocephin 1 g intramuscularly now because of suspected urinary tract infection occurring soon after taking oral ciprofloxacin and oral Cefdinir    2.   Start Bactrim DS generic 1 tablet

## 2019-01-18 NOTE — PROGRESS NOTES
HPI:    Patient ID: Isamar Waters is a 66year old male. HPI    Pain with urination. Pt c/o pain w/urination and w/bowel movements. 1/14/19 TRUSBX given ciprofloxacin 500 mg and cefdinir 300 mg both BID starting 24 hr before biopsy.   Initial onset involvement; aggressive nature; Complete standard treatment with RCHOP x 6 cycles followed by L testicular RT, followed by 4 doses of IT MTX for CNS prophylaxis as recommended for testicular lymphoma.  EF 63% and negative stress test.  Pt first saw me in co breath. Gastrointestinal: Negative for abdominal pain and constipation. Genitourinary: Positive for dysuria. Negative for flank pain and hematuria (Gross). Skin: Negative for color change. Neurological: Negative for speech difficulty.    Psychiatri • PORT, INDWELLING, IMP     • REMOVAL TESTIS,SIMPLE        Family History   Problem Relation Age of Onset   • Heart Disease Mother    • Heart Disease Father    • Breast Cancer Sister 36      Social History: Social History    Tobacco Use      Smoking stat adenocarcinoma Crescencio 6 (3+3), involving 1 of 2 cores, approximately 15% of submitted specimen. Left apex: prostatic adenocarcinoma, [place] 6 (3+3), involving 1 of 3 cores approximately 5% of submitted specimen.   Other regions negative for cancer.     U alternatives of continuing  tamsulosin 0.4 mg BID;vs observation and I answered patient's questions on treatment; patient understands all of this and decides to continue  tamsulosin 0.4 mg BID and I electronically sent order.     [facility] 1 capsule twice daily to help dilate canal and the prostate through which you urinate and to try to improve your urination problem    5.   If you develop sore throat or diarrhea, please call your primary physician    No orders of the defined types were plac

## 2019-01-18 NOTE — TELEPHONE ENCOUNTER
I notified patient of normal CBC results while he is in the office this late afternoon.   Dr. Matthew Del Rosario

## 2019-01-18 NOTE — TELEPHONE ENCOUNTER
Patient contacted. Patient c/o urinary frequency, urgency, unable to make it to the bathroom, feeling of warm with chills on and off; onset 2 days ago. Patient is 4 days s/p TRUSBX. Patient states first two days after biopsy he was feeling fine.       Shaquille

## 2019-01-18 NOTE — PROGRESS NOTES
I was asked by PVK to administer a 1 time injection of Ceftriaxone 1 GM IM to pt today. I obtained the med from the med cabinet and prepared it by reconstituting the Ceftriaxone powder with 2.1 ml of Lidocaine HCL 1 % soln.  I then labeled the med with the

## 2019-01-18 NOTE — TELEPHONE ENCOUNTER
Received STAT CBC results from lab. Routed to Dr. Nohemi MONTALVO; patient has appt today @ 3:40 pm.

## 2019-01-18 NOTE — TELEPHONE ENCOUNTER
Pt presents at desk in pain. Pt states he had a biopsy on Monday and it is very painful when he urinates. Went to back was told by nurse Marcos to put in an acute telephone encounter. which I did. Pt was told he would be contacted by a nurse shortly.

## 2019-01-21 ENCOUNTER — TELEPHONE (OUTPATIENT)
Dept: SURGERY | Facility: CLINIC | Age: 64
End: 2019-01-21

## 2019-01-21 ENCOUNTER — HOSPITAL ENCOUNTER (EMERGENCY)
Facility: HOSPITAL | Age: 64
Discharge: HOME | End: 2019-01-21
Attending: EMERGENCY MEDICINE
Payer: COMMERCIAL

## 2019-01-21 VITALS
TEMPERATURE: 98 F | DIASTOLIC BLOOD PRESSURE: 75 MMHG | HEART RATE: 62 BPM | BODY MASS INDEX: 23.52 KG/M2 | WEIGHT: 168 LBS | SYSTOLIC BLOOD PRESSURE: 135 MMHG | OXYGEN SATURATION: 99 % | RESPIRATION RATE: 18 BRPM | HEIGHT: 71 IN

## 2019-01-21 DIAGNOSIS — Z16.12 ESBL (EXTENDED SPECTRUM BETA-LACTAMASE) PRODUCING BACTERIA INFECTION: Primary | ICD-10-CM

## 2019-01-21 DIAGNOSIS — A49.9 ESBL (EXTENDED SPECTRUM BETA-LACTAMASE) PRODUCING BACTERIA INFECTION: Primary | ICD-10-CM

## 2019-01-21 LAB
ANION GAP SERPL CALC-SCNC: 14 MMOL/L (ref 0–18)
BASOPHILS # BLD: 0 K/UL (ref 0–0.2)
BASOPHILS NFR BLD: 1 %
BILIRUB UR QL: NEGATIVE
BUN SERPL-MCNC: 23 MG/DL (ref 8–20)
BUN/CREAT SERPL: 19 (ref 10–20)
CALCIUM SERPL-MCNC: 9.3 MG/DL (ref 8.5–10.5)
CHLORIDE SERPL-SCNC: 99 MMOL/L (ref 95–110)
CLARITY UR: CLEAR
CO2 SERPL-SCNC: 22 MMOL/L (ref 22–32)
COLOR UR: YELLOW
CREAT SERPL-MCNC: 1.21 MG/DL (ref 0.5–1.5)
EOSINOPHIL # BLD: 0.1 K/UL (ref 0–0.7)
EOSINOPHIL NFR BLD: 2 %
ERYTHROCYTE [DISTWIDTH] IN BLOOD BY AUTOMATED COUNT: 13.5 % (ref 11–15)
GLUCOSE SERPL-MCNC: 272 MG/DL (ref 70–99)
GLUCOSE UR-MCNC: >=500 MG/DL
HCT VFR BLD AUTO: 37.9 % (ref 41–52)
HGB BLD-MCNC: 12.8 G/DL (ref 13.5–17.5)
KETONES UR-MCNC: NEGATIVE MG/DL
LYMPHOCYTES # BLD: 2.1 K/UL (ref 1–4)
LYMPHOCYTES NFR BLD: 38 %
MCH RBC QN AUTO: 32.7 PG (ref 27–32)
MCHC RBC AUTO-ENTMCNC: 33.9 G/DL (ref 32–37)
MCV RBC AUTO: 96.6 FL (ref 80–100)
MONOCYTES # BLD: 0.5 K/UL (ref 0–1)
MONOCYTES NFR BLD: 9 %
NEUTROPHILS # BLD AUTO: 2.9 K/UL (ref 1.8–7.7)
NEUTROPHILS NFR BLD: 51 %
NITRITE UR QL STRIP.AUTO: NEGATIVE
OSMOLALITY UR CALC.SUM OF ELEC: 293 MOSM/KG (ref 275–295)
PH UR: 5 [PH] (ref 5–8)
PLATELET # BLD AUTO: 217 K/UL (ref 140–400)
PMV BLD AUTO: 9.4 FL (ref 7.4–10.3)
POTASSIUM SERPL-SCNC: 4 MMOL/L (ref 3.3–5.1)
PROT UR-MCNC: NEGATIVE MG/DL
RBC # BLD AUTO: 3.92 M/UL (ref 4.5–5.9)
RBC #/AREA URNS AUTO: 30 /HPF
SODIUM SERPL-SCNC: 135 MMOL/L (ref 136–144)
SP GR UR STRIP: 1.02 (ref 1–1.03)
UROBILINOGEN UR STRIP-ACNC: <2
VIT C UR-MCNC: NEGATIVE MG/DL
WBC # BLD AUTO: 5.7 K/UL (ref 4–11)
WBC #/AREA URNS AUTO: 11 /HPF

## 2019-01-21 PROCEDURE — 85025 COMPLETE CBC W/AUTO DIFF WBC: CPT | Performed by: EMERGENCY MEDICINE

## 2019-01-21 PROCEDURE — 96366 THER/PROPH/DIAG IV INF ADDON: CPT

## 2019-01-21 PROCEDURE — 81001 URINALYSIS AUTO W/SCOPE: CPT | Performed by: EMERGENCY MEDICINE

## 2019-01-21 PROCEDURE — 80048 BASIC METABOLIC PNL TOTAL CA: CPT | Performed by: EMERGENCY MEDICINE

## 2019-01-21 PROCEDURE — 96365 THER/PROPH/DIAG IV INF INIT: CPT

## 2019-01-21 PROCEDURE — 87086 URINE CULTURE/COLONY COUNT: CPT | Performed by: EMERGENCY MEDICINE

## 2019-01-21 PROCEDURE — 99284 EMERGENCY DEPT VISIT MOD MDM: CPT

## 2019-01-21 NOTE — TELEPHONE ENCOUNTER
I called and discussed treatment and management of positive urine culture E. coli ESBL with Dr. Светлана Rowell, who agreed that patient should go to the emergency room; I also discussed by phone with .    Patient was instructed by y my nurses to

## 2019-01-21 NOTE — ED PROVIDER NOTES
Patient Seen in: [facility] AND [facility] Emergency Department    History   Patient presents with:  Urinary Symptoms (urologic)    Stated Complaint:     HPI    Patient complains of resistant bacteria growing in his urine.   Patient had a prostate biopsy last wee - take as directed. Fenofibrate 145 MG Oral Tab,  Take 1 tablet (145 mg total) by mouth daily. Tadalafil (CIALIS) 20 MG Oral Tab,  Take 1 tablet (20 mg total) by mouth daily as needed for Erectile Dysfunction.        Family History   Problem Relation Ag Labs Reviewed   BASIC METABOLIC PANEL (8) - Abnormal; Notable for the following components:       Result Value    Glucose 272 (*)     Sodium 135 (*)     BUN 23 (*)     All other components within normal limits   URINALYSIS WITH CULTURE REFLEX - Abnorma (extended spectrum beta-lactamase) producing bacteria infection  (primary encounter diagnosis)    Disposition:  Discharge    Follow-up:  León Mills MD  [address] [place] [address]  [phone]            Medications Prescribed:  Cu

## 2019-01-21 NOTE — TELEPHONE ENCOUNTER
Nurses, please call patient;   His urine culture is growing E. coli ESBL which is immune and resistant to numerous antibiotics including all the antibiotics he took before and during his prostate biopsy and also all the antibiotics we gave him on Friday and

## 2019-01-21 NOTE — CONSULTS
INFECTIOUS DISEASE CONSULT NOTE    Mancel Justin Patient Status:  Emergency    1955 MRN [id number]   Location [address] Attending Yesica Sparrow MD   [address] history. he has never used smokeless tobacco. He reports that he drinks alcohol. He reports that he does not use drugs. Allergies:  No Known Allergies    Medications:  No current facility-administered medications for this encounter.      Review of System Microbiology    Reviewed in EMR    Radiology: Reviewed      Antibiotics: Meropenem      ASSESSMENT:  72-year-old male with a history of hypertension, diffuse large B-cell lymphoma, diabetes s/p chemo/XRT in remission, prostate cancer not on active th

## 2019-01-21 NOTE — TELEPHONE ENCOUNTER
Dear Dr. Surinder Allen,    Zheng Song had symptoms of dysuria on Friday so we had him submit a urine culture, gave him Rocephin 1 g IM and started him on Bactrim. Unfortunately, urine culture is growing E. coli ESBL sensitive to nitrofurantoin but that antibiotic would be insufficient since his symptoms suggest prostatitis and the oral nitrofurantoin would be insufficient; otherwise, the bacteria is only sensitive to IV meropenem and another IV antibiotic; this infection occurred after a prostate biopsy that was covered with both oral ciprofloxacin and oral Cefdinir, so that patient was likely harboring this bacteria in his colon, and this likely how the infection occurred. I will asked my nurses to call patient and have him go to the emergency room; needs to be admitted or at the very least set up for outpatient IV Invanz.   Many thanks,  Florinda Pereira, urology

## 2019-01-21 NOTE — TELEPHONE ENCOUNTER
Phoned pt on home number and received his voice mail. [facility]. Clinic call back number provided. Phoned pt at work and spoke with him. Verified his identity by spelling of first and last name and   Read to him 's note as outlined below in this enc

## 2019-01-22 ENCOUNTER — TELEPHONE (OUTPATIENT)
Dept: FAMILY MEDICINE CLINIC | Facility: CLINIC | Age: 64
End: 2019-01-22

## 2019-01-22 ENCOUNTER — TELEPHONE (OUTPATIENT)
Dept: GASTROENTEROLOGY | Facility: CLINIC | Age: 64
End: 2019-01-22

## 2019-01-22 ENCOUNTER — TELEPHONE (OUTPATIENT)
Dept: SURGERY | Facility: CLINIC | Age: 64
End: 2019-01-22

## 2019-01-22 ENCOUNTER — APPOINTMENT (OUTPATIENT)
Dept: HEMATOLOGY/ONCOLOGY | Facility: HOSPITAL | Age: 64
End: 2019-01-22
Attending: INTERNAL MEDICINE
Payer: COMMERCIAL

## 2019-01-22 ENCOUNTER — HOSPITAL ENCOUNTER (OUTPATIENT)
Facility: HOSPITAL | Age: 64
Discharge: HOME | End: 2019-01-22
Attending: EMERGENCY MEDICINE | Admitting: EMERGENCY MEDICINE
Payer: COMMERCIAL

## 2019-01-22 VITALS
WEIGHT: 185.88 LBS | OXYGEN SATURATION: 98 % | HEART RATE: 62 BPM | HEIGHT: 71 IN | TEMPERATURE: 98 F | SYSTOLIC BLOOD PRESSURE: 112 MMHG | BODY MASS INDEX: 26.02 KG/M2 | DIASTOLIC BLOOD PRESSURE: 81 MMHG | RESPIRATION RATE: 18 BRPM

## 2019-01-22 DIAGNOSIS — Z16.12 URINARY TRACT INFECTION DUE TO EXTENDED-SPECTRUM BETA LACTAMASE (ESBL) PRODUCING ESCHERICHIA COLI: ICD-10-CM

## 2019-01-22 DIAGNOSIS — C61 PROSTATE CANCER (HCC): Primary | ICD-10-CM

## 2019-01-22 DIAGNOSIS — N39.0 URINARY TRACT INFECTION DUE TO EXTENDED-SPECTRUM BETA LACTAMASE (ESBL) PRODUCING ESCHERICHIA COLI: ICD-10-CM

## 2019-01-22 DIAGNOSIS — B96.29 URINARY TRACT INFECTION DUE TO EXTENDED-SPECTRUM BETA LACTAMASE (ESBL) PRODUCING ESCHERICHIA COLI: ICD-10-CM

## 2019-01-22 PROBLEM — A49.9 ESBL (EXTENDED SPECTRUM BETA-LACTAMASE) PRODUCING BACTERIA INFECTION: Status: ACTIVE | Noted: 2019-01-22

## 2019-01-22 PROCEDURE — 96365 THER/PROPH/DIAG IV INF INIT: CPT

## 2019-01-22 RX ORDER — 0.9 % SODIUM CHLORIDE 0.9 %
VIAL (ML) INJECTION
Status: COMPLETED
Start: 2019-01-22 | End: 2019-01-22

## 2019-01-22 NOTE — TELEPHONE ENCOUNTER
----- Message from Alicia Jimenez RN sent at 10/22/2018  2:07 PM CDT -----  Regarding: recall colonoscopy  Pt cancelling CLN octavio 12/15/18, pt will cb when he returns from [facility] to [place] [address], thanks.   #Maltese spk    Recalled for 3 months    Pt of EBS

## 2019-01-22 NOTE — PLAN OF CARE
REC'D PATIENT TO [facility] AMBULATORY AND IN NO DISTRESS FOR IV ANTIBIOTICS, RIGHT CHEST PORT ACCESSED AND FLUSHED WITH NS 10 ML, INVANZ 1 GRAM INFUSED. ACCESS FLUSHED AGAIN WITH NS 10 ML AND THEN PORT DE-ACCESSED. PATIENT DISCHARGED IN GOOD CONDITION.  TOLD HAY

## 2019-01-22 NOTE — TELEPHONE ENCOUNTER
Pt cancelled appt today due to seeing the [facility] , when complete he will call  Dr Marcela Ashton.

## 2019-01-23 ENCOUNTER — OFFICE VISIT (OUTPATIENT)
Dept: HEMATOLOGY/ONCOLOGY | Facility: HOSPITAL | Age: 64
End: 2019-01-23
Attending: EMERGENCY MEDICINE
Payer: COMMERCIAL

## 2019-01-23 VITALS
OXYGEN SATURATION: 97 % | DIASTOLIC BLOOD PRESSURE: 74 MMHG | RESPIRATION RATE: 16 BRPM | SYSTOLIC BLOOD PRESSURE: 133 MMHG | TEMPERATURE: 98 F | HEART RATE: 81 BPM

## 2019-01-23 DIAGNOSIS — Z95.828 PORT-A-CATH IN PLACE: Primary | ICD-10-CM

## 2019-01-23 DIAGNOSIS — A49.9 ESBL (EXTENDED SPECTRUM BETA-LACTAMASE) PRODUCING BACTERIA INFECTION: ICD-10-CM

## 2019-01-23 DIAGNOSIS — Z16.12 ESBL (EXTENDED SPECTRUM BETA-LACTAMASE) PRODUCING BACTERIA INFECTION: ICD-10-CM

## 2019-01-23 DIAGNOSIS — C83.30 DLBCL (DIFFUSE LARGE B CELL LYMPHOMA) (HCC): ICD-10-CM

## 2019-01-23 PROCEDURE — 96365 THER/PROPH/DIAG IV INF INIT: CPT

## 2019-01-23 RX ORDER — HEPARIN SODIUM (PORCINE) LOCK FLUSH IV SOLN 100 UNIT/ML 100 UNIT/ML
SOLUTION INTRAVENOUS
Status: COMPLETED
Start: 2019-01-23 | End: 2019-01-23

## 2019-01-23 RX ORDER — SODIUM CHLORIDE 9 MG/ML
INJECTION, SOLUTION INTRAVENOUS
Status: DISCONTINUED
Start: 2019-01-23 | End: 2019-01-23

## 2019-01-23 RX ORDER — HEPARIN SODIUM (PORCINE) LOCK FLUSH IV SOLN 100 UNIT/ML 100 UNIT/ML
5 SOLUTION INTRAVENOUS ONCE
Status: COMPLETED | OUTPATIENT
Start: 2019-01-23 | End: 2019-01-23

## 2019-01-23 RX ORDER — 0.9 % SODIUM CHLORIDE 0.9 %
VIAL (ML) INJECTION
Status: DISCONTINUED
Start: 2019-01-23 | End: 2019-01-23

## 2019-01-23 RX ORDER — 0.9 % SODIUM CHLORIDE 0.9 %
10 VIAL (ML) INJECTION ONCE
Status: CANCELLED | OUTPATIENT
Start: 2019-01-23

## 2019-01-23 RX ORDER — HEPARIN SODIUM (PORCINE) LOCK FLUSH IV SOLN 100 UNIT/ML 100 UNIT/ML
5 SOLUTION INTRAVENOUS ONCE
Status: CANCELLED | OUTPATIENT
Start: 2019-01-23

## 2019-01-23 RX ADMIN — HEPARIN SODIUM (PORCINE) LOCK FLUSH IV SOLN 100 UNIT/ML 500 UNITS: 100 SOLUTION INTRAVENOUS at 17:18:00

## 2019-01-23 NOTE — PROGRESS NOTES
Pt to infusion for daily Invanz for ESBL urine. Arrived ambulating independently. Pt reports not feeling well, but states he feels better since starting infusions. Port in R chest. Pt does not wish to leave accessed as he works every day.    Infusion to

## 2019-01-24 ENCOUNTER — OFFICE VISIT (OUTPATIENT)
Dept: HEMATOLOGY/ONCOLOGY | Facility: HOSPITAL | Age: 64
End: 2019-01-24
Attending: EMERGENCY MEDICINE
Payer: COMMERCIAL

## 2019-01-24 VITALS
HEART RATE: 62 BPM | RESPIRATION RATE: 16 BRPM | OXYGEN SATURATION: 97 % | SYSTOLIC BLOOD PRESSURE: 126 MMHG | DIASTOLIC BLOOD PRESSURE: 66 MMHG | TEMPERATURE: 98 F

## 2019-01-24 DIAGNOSIS — A49.9 ESBL (EXTENDED SPECTRUM BETA-LACTAMASE) PRODUCING BACTERIA INFECTION: Primary | ICD-10-CM

## 2019-01-24 DIAGNOSIS — Z16.12 ESBL (EXTENDED SPECTRUM BETA-LACTAMASE) PRODUCING BACTERIA INFECTION: Primary | ICD-10-CM

## 2019-01-24 PROCEDURE — 96365 THER/PROPH/DIAG IV INF INIT: CPT

## 2019-01-24 RX ORDER — 0.9 % SODIUM CHLORIDE 0.9 %
10 VIAL (ML) INJECTION ONCE
Status: CANCELLED | OUTPATIENT
Start: 2019-01-24

## 2019-01-24 RX ORDER — 0.9 % SODIUM CHLORIDE 0.9 %
VIAL (ML) INJECTION
Status: DISCONTINUED
Start: 2019-01-24 | End: 2019-01-24

## 2019-01-24 RX ORDER — HEPARIN SODIUM (PORCINE) LOCK FLUSH IV SOLN 100 UNIT/ML 100 UNIT/ML
5 SOLUTION INTRAVENOUS ONCE
Status: CANCELLED | OUTPATIENT
Start: 2019-01-24

## 2019-01-24 RX ORDER — SODIUM CHLORIDE 9 MG/ML
INJECTION, SOLUTION INTRAVENOUS
Status: DISCONTINUED
Start: 2019-01-24 | End: 2019-01-24

## 2019-01-24 NOTE — PROGRESS NOTES
Pt to infusion for daily Invanz for ESBL urine. Arrived ambulating independently. C/o fatigue . Pt states he has been at work, Denies fevers or dysuria. Port in R chest. Pt does not wish to leave accessed as he works every day.    Infusion tolerated well

## 2019-01-25 ENCOUNTER — OFFICE VISIT (OUTPATIENT)
Dept: HEMATOLOGY/ONCOLOGY | Facility: HOSPITAL | Age: 64
End: 2019-01-25
Attending: EMERGENCY MEDICINE
Payer: COMMERCIAL

## 2019-01-25 VITALS
RESPIRATION RATE: 16 BRPM | OXYGEN SATURATION: 97 % | HEART RATE: 63 BPM | TEMPERATURE: 98 F | DIASTOLIC BLOOD PRESSURE: 66 MMHG | SYSTOLIC BLOOD PRESSURE: 119 MMHG

## 2019-01-25 DIAGNOSIS — Z95.828 PORT-A-CATH IN PLACE: Primary | ICD-10-CM

## 2019-01-25 DIAGNOSIS — C83.30 DLBCL (DIFFUSE LARGE B CELL LYMPHOMA) (HCC): ICD-10-CM

## 2019-01-25 DIAGNOSIS — Z16.12 ESBL (EXTENDED SPECTRUM BETA-LACTAMASE) PRODUCING BACTERIA INFECTION: ICD-10-CM

## 2019-01-25 DIAGNOSIS — A49.9 ESBL (EXTENDED SPECTRUM BETA-LACTAMASE) PRODUCING BACTERIA INFECTION: ICD-10-CM

## 2019-01-25 PROCEDURE — 96365 THER/PROPH/DIAG IV INF INIT: CPT

## 2019-01-25 RX ORDER — HEPARIN SODIUM (PORCINE) LOCK FLUSH IV SOLN 100 UNIT/ML 100 UNIT/ML
5 SOLUTION INTRAVENOUS ONCE
Status: COMPLETED | OUTPATIENT
Start: 2019-01-25 | End: 2019-01-25

## 2019-01-25 RX ORDER — 0.9 % SODIUM CHLORIDE 0.9 %
VIAL (ML) INJECTION
Status: DISCONTINUED
Start: 2019-01-25 | End: 2019-01-25

## 2019-01-25 RX ORDER — 0.9 % SODIUM CHLORIDE 0.9 %
10 VIAL (ML) INJECTION ONCE
Status: CANCELLED | OUTPATIENT
Start: 2019-01-25

## 2019-01-25 RX ORDER — HEPARIN SODIUM (PORCINE) LOCK FLUSH IV SOLN 100 UNIT/ML 100 UNIT/ML
5 SOLUTION INTRAVENOUS ONCE
Status: CANCELLED | OUTPATIENT
Start: 2019-01-25

## 2019-01-25 RX ORDER — SODIUM CHLORIDE 9 MG/ML
INJECTION, SOLUTION INTRAVENOUS
Status: DISCONTINUED
Start: 2019-01-25 | End: 2019-01-25

## 2019-01-25 RX ADMIN — HEPARIN SODIUM (PORCINE) LOCK FLUSH IV SOLN 100 UNIT/ML 500 UNITS: 100 SOLUTION INTRAVENOUS at 13:40:00

## 2019-01-25 NOTE — PROGRESS NOTES
Pt to infusion for daily Invanz for ESBL urine. Arrived ambulating independently. C/o fatigue . Pt states he has been at work, Denies fevers or dysuria. He had a concern - \"Why did I get this infection? \"  Reviewed Dr Mann Castelan' notes, explained they a

## 2019-01-26 ENCOUNTER — OFFICE VISIT (OUTPATIENT)
Dept: HEMATOLOGY/ONCOLOGY | Facility: HOSPITAL | Age: 64
End: 2019-01-26
Attending: EMERGENCY MEDICINE
Payer: COMMERCIAL

## 2019-01-26 VITALS
TEMPERATURE: 98 F | SYSTOLIC BLOOD PRESSURE: 127 MMHG | DIASTOLIC BLOOD PRESSURE: 62 MMHG | OXYGEN SATURATION: 98 % | RESPIRATION RATE: 16 BRPM | HEART RATE: 60 BPM

## 2019-01-26 DIAGNOSIS — A49.9 ESBL (EXTENDED SPECTRUM BETA-LACTAMASE) PRODUCING BACTERIA INFECTION: ICD-10-CM

## 2019-01-26 DIAGNOSIS — Z16.12 ESBL (EXTENDED SPECTRUM BETA-LACTAMASE) PRODUCING BACTERIA INFECTION: ICD-10-CM

## 2019-01-26 DIAGNOSIS — Z95.828 PORT-A-CATH IN PLACE: Primary | ICD-10-CM

## 2019-01-26 DIAGNOSIS — C83.30 DLBCL (DIFFUSE LARGE B CELL LYMPHOMA) (HCC): ICD-10-CM

## 2019-01-26 PROCEDURE — 96365 THER/PROPH/DIAG IV INF INIT: CPT

## 2019-01-26 RX ORDER — HEPARIN SODIUM (PORCINE) LOCK FLUSH IV SOLN 100 UNIT/ML 100 UNIT/ML
5 SOLUTION INTRAVENOUS ONCE
Status: COMPLETED | OUTPATIENT
Start: 2019-01-26 | End: 2019-01-26

## 2019-01-26 RX ORDER — HEPARIN SODIUM (PORCINE) LOCK FLUSH IV SOLN 100 UNIT/ML 100 UNIT/ML
SOLUTION INTRAVENOUS
Status: COMPLETED
Start: 2019-01-26 | End: 2019-01-26

## 2019-01-26 RX ORDER — SODIUM CHLORIDE 9 MG/ML
INJECTION, SOLUTION INTRAVENOUS
Status: DISCONTINUED
Start: 2019-01-26 | End: 2019-01-26

## 2019-01-26 RX ORDER — 0.9 % SODIUM CHLORIDE 0.9 %
10 VIAL (ML) INJECTION ONCE
Status: CANCELLED | OUTPATIENT
Start: 2019-01-26

## 2019-01-26 RX ORDER — 0.9 % SODIUM CHLORIDE 0.9 %
VIAL (ML) INJECTION
Status: DISCONTINUED
Start: 2019-01-26 | End: 2019-01-26

## 2019-01-26 RX ORDER — HEPARIN SODIUM (PORCINE) LOCK FLUSH IV SOLN 100 UNIT/ML 100 UNIT/ML
5 SOLUTION INTRAVENOUS ONCE
Status: CANCELLED | OUTPATIENT
Start: 2019-01-26

## 2019-01-26 RX ADMIN — HEPARIN SODIUM (PORCINE) LOCK FLUSH IV SOLN 100 UNIT/ML 500 UNITS: 100 SOLUTION INTRAVENOUS at 09:35:00

## 2019-01-26 NOTE — PROGRESS NOTES
Mickey to infusion today for daily Invanz for ESBL of urine. No Complaints, feeling well. Only c/o some fatigue, continues to work. Requests to have port accessed and de-accessed each day due to working.     Port accessed using sterile technique with 20g

## 2019-01-27 ENCOUNTER — OFFICE VISIT (OUTPATIENT)
Dept: HEMATOLOGY/ONCOLOGY | Facility: HOSPITAL | Age: 64
End: 2019-01-27
Attending: EMERGENCY MEDICINE
Payer: COMMERCIAL

## 2019-01-27 VITALS
SYSTOLIC BLOOD PRESSURE: 113 MMHG | RESPIRATION RATE: 16 BRPM | HEART RATE: 57 BPM | DIASTOLIC BLOOD PRESSURE: 66 MMHG | TEMPERATURE: 98 F

## 2019-01-27 DIAGNOSIS — A49.9 ESBL (EXTENDED SPECTRUM BETA-LACTAMASE) PRODUCING BACTERIA INFECTION: Primary | ICD-10-CM

## 2019-01-27 DIAGNOSIS — Z16.12 ESBL (EXTENDED SPECTRUM BETA-LACTAMASE) PRODUCING BACTERIA INFECTION: Primary | ICD-10-CM

## 2019-01-27 PROCEDURE — 96365 THER/PROPH/DIAG IV INF INIT: CPT

## 2019-01-27 RX ORDER — HEPARIN SODIUM (PORCINE) LOCK FLUSH IV SOLN 100 UNIT/ML 100 UNIT/ML
5 SOLUTION INTRAVENOUS ONCE
Status: CANCELLED | OUTPATIENT
Start: 2019-01-27

## 2019-01-27 RX ORDER — SODIUM CHLORIDE 9 MG/ML
INJECTION, SOLUTION INTRAVENOUS
Status: DISPENSED
Start: 2019-01-27 | End: 2019-01-27

## 2019-01-27 RX ORDER — HEPARIN SODIUM (PORCINE) LOCK FLUSH IV SOLN 100 UNIT/ML 100 UNIT/ML
SOLUTION INTRAVENOUS
Status: DISPENSED
Start: 2019-01-27 | End: 2019-01-27

## 2019-01-27 RX ORDER — 0.9 % SODIUM CHLORIDE 0.9 %
VIAL (ML) INJECTION
Status: DISPENSED
Start: 2019-01-27 | End: 2019-01-27

## 2019-01-27 RX ORDER — 0.9 % SODIUM CHLORIDE 0.9 %
10 VIAL (ML) INJECTION ONCE
Status: CANCELLED | OUTPATIENT
Start: 2019-01-27

## 2019-01-28 ENCOUNTER — OFFICE VISIT (OUTPATIENT)
Dept: HEMATOLOGY/ONCOLOGY | Facility: HOSPITAL | Age: 64
End: 2019-01-28
Attending: EMERGENCY MEDICINE
Payer: COMMERCIAL

## 2019-01-28 VITALS
DIASTOLIC BLOOD PRESSURE: 68 MMHG | TEMPERATURE: 98 F | RESPIRATION RATE: 16 BRPM | SYSTOLIC BLOOD PRESSURE: 120 MMHG | HEART RATE: 60 BPM | OXYGEN SATURATION: 96 %

## 2019-01-28 DIAGNOSIS — A49.9 ESBL (EXTENDED SPECTRUM BETA-LACTAMASE) PRODUCING BACTERIA INFECTION: Primary | ICD-10-CM

## 2019-01-28 DIAGNOSIS — Z16.12 ESBL (EXTENDED SPECTRUM BETA-LACTAMASE) PRODUCING BACTERIA INFECTION: Primary | ICD-10-CM

## 2019-01-28 PROCEDURE — 96365 THER/PROPH/DIAG IV INF INIT: CPT

## 2019-01-28 RX ORDER — HEPARIN SODIUM (PORCINE) LOCK FLUSH IV SOLN 100 UNIT/ML 100 UNIT/ML
5 SOLUTION INTRAVENOUS ONCE
Status: CANCELLED | OUTPATIENT
Start: 2019-01-28

## 2019-01-28 RX ORDER — 0.9 % SODIUM CHLORIDE 0.9 %
10 VIAL (ML) INJECTION ONCE
Status: CANCELLED | OUTPATIENT
Start: 2019-01-28

## 2019-01-28 RX ORDER — SODIUM CHLORIDE 9 MG/ML
INJECTION, SOLUTION INTRAVENOUS
Status: DISCONTINUED
Start: 2019-01-28 | End: 2019-01-28

## 2019-01-28 RX ORDER — 0.9 % SODIUM CHLORIDE 0.9 %
VIAL (ML) INJECTION
Status: DISCONTINUED
Start: 2019-01-28 | End: 2019-01-28

## 2019-01-28 RX ORDER — HEPARIN SODIUM (PORCINE) LOCK FLUSH IV SOLN 100 UNIT/ML 100 UNIT/ML
SOLUTION INTRAVENOUS
Status: DISCONTINUED
Start: 2019-01-28 | End: 2019-01-28

## 2019-01-28 RX ORDER — 0.9 % SODIUM CHLORIDE 0.9 %
10 VIAL (ML) INJECTION ONCE
Status: CANCELLED | OUTPATIENT
Start: 2019-01-29

## 2019-01-28 RX ORDER — HEPARIN SODIUM (PORCINE) LOCK FLUSH IV SOLN 100 UNIT/ML 100 UNIT/ML
5 SOLUTION INTRAVENOUS ONCE
Status: CANCELLED | OUTPATIENT
Start: 2019-01-29

## 2019-01-28 NOTE — PROGRESS NOTES
Pt here for daily abx for UTI s/p prostate biopsy. No Complaints, feeling well, denies fever, chills or dysuria or hematuria. Continues to work, requests to have port accessed and de-accessed each day due to working.     Discharged to [facility] indep

## 2019-01-29 ENCOUNTER — APPOINTMENT (OUTPATIENT)
Dept: HEMATOLOGY/ONCOLOGY | Facility: HOSPITAL | Age: 64
End: 2019-01-29
Attending: INTERNAL MEDICINE
Payer: COMMERCIAL

## 2019-01-29 ENCOUNTER — OFFICE VISIT (OUTPATIENT)
Dept: HEMATOLOGY/ONCOLOGY | Facility: HOSPITAL | Age: 64
End: 2019-01-29
Attending: EMERGENCY MEDICINE
Payer: COMMERCIAL

## 2019-01-29 VITALS
DIASTOLIC BLOOD PRESSURE: 65 MMHG | OXYGEN SATURATION: 98 % | HEART RATE: 58 BPM | RESPIRATION RATE: 16 BRPM | SYSTOLIC BLOOD PRESSURE: 120 MMHG | TEMPERATURE: 98 F

## 2019-01-29 DIAGNOSIS — Z16.12 ESBL (EXTENDED SPECTRUM BETA-LACTAMASE) PRODUCING BACTERIA INFECTION: ICD-10-CM

## 2019-01-29 DIAGNOSIS — Z85.72 HISTORY OF B-CELL LYMPHOMA: ICD-10-CM

## 2019-01-29 DIAGNOSIS — Z85.46 HISTORY OF PROSTATE CANCER: ICD-10-CM

## 2019-01-29 DIAGNOSIS — Z95.828 PORT-A-CATH IN PLACE: Primary | ICD-10-CM

## 2019-01-29 DIAGNOSIS — C83.30 DLBCL (DIFFUSE LARGE B CELL LYMPHOMA) (HCC): ICD-10-CM

## 2019-01-29 DIAGNOSIS — A49.9 ESBL (EXTENDED SPECTRUM BETA-LACTAMASE) PRODUCING BACTERIA INFECTION: ICD-10-CM

## 2019-01-29 LAB
ALBUMIN SERPL BCP-MCNC: 3.8 G/DL (ref 3.5–4.8)
ALBUMIN/GLOB SERPL: 1.2 {RATIO} (ref 1–2)
ALP SERPL-CCNC: 43 U/L (ref 32–100)
ALT SERPL-CCNC: 30 U/L (ref 17–63)
ANION GAP SERPL CALC-SCNC: 12 MMOL/L (ref 0–18)
AST SERPL-CCNC: 28 U/L (ref 15–41)
BASOPHILS # BLD AUTO: 0.06 X10(3) UL (ref 0–0.2)
BASOPHILS NFR BLD AUTO: 1.1 %
BILIRUB SERPL-MCNC: 0.5 MG/DL (ref 0.3–1.2)
BUN SERPL-MCNC: 15 MG/DL (ref 8–20)
BUN/CREAT SERPL: 26.8 (ref 10–20)
CALCIUM SERPL-MCNC: 8.8 MG/DL (ref 8.5–10.5)
CHLORIDE SERPL-SCNC: 99 MMOL/L (ref 95–110)
CO2 SERPL-SCNC: 21 MMOL/L (ref 22–32)
CREAT SERPL-MCNC: 0.56 MG/DL (ref 0.5–1.5)
DEPRECATED RDW RBC AUTO: 43.2 FL (ref 35.1–46.3)
EOSINOPHIL # BLD AUTO: 0.07 X10(3) UL (ref 0–0.7)
EOSINOPHIL NFR BLD AUTO: 1.3 %
ERYTHROCYTE [DISTWIDTH] IN BLOOD BY AUTOMATED COUNT: 12.3 % (ref 11–15)
GLOBULIN PLAS-MCNC: 3.2 G/DL (ref 2.5–3.7)
GLUCOSE SERPL-MCNC: 141 MG/DL (ref 70–99)
HCT VFR BLD AUTO: 35 % (ref 39–53)
HGB BLD-MCNC: 12.2 G/DL (ref 13–17.5)
IMM GRANULOCYTES # BLD AUTO: 0.01 X10(3) UL (ref 0–1)
IMM GRANULOCYTES NFR BLD: 0.2 %
LDH SERPL L TO P-CCNC: 137 U/L (ref 98–192)
LYMPHOCYTES # BLD AUTO: 2.64 X10(3) UL (ref 1–4)
LYMPHOCYTES NFR BLD AUTO: 47.8 %
MCH RBC QN AUTO: 33.2 PG (ref 26–34)
MCHC RBC AUTO-ENTMCNC: 34.9 G/DL (ref 31–37)
MCV RBC AUTO: 95.1 FL (ref 80–100)
MONOCYTES # BLD AUTO: 0.28 X10(3) UL (ref 0.1–1)
MONOCYTES NFR BLD AUTO: 5.1 %
NEUTROPHILS # BLD AUTO: 2.46 X10 (3) UL (ref 1.5–7.7)
NEUTROPHILS # BLD AUTO: 2.46 X10(3) UL (ref 1.5–7.7)
NEUTROPHILS NFR BLD AUTO: 44.5 %
OSMOLALITY UR CALC.SUM OF ELEC: 277 MOSM/KG (ref 275–295)
PATIENT FASTING: NO
PLATELET # BLD AUTO: 329 10(3)UL (ref 150–450)
POTASSIUM SERPL-SCNC: 3.2 MMOL/L (ref 3.3–5.1)
PROT SERPL-MCNC: 7 G/DL (ref 5.9–8.4)
RBC # BLD AUTO: 3.68 X10(6)UL (ref 4.3–5.7)
SODIUM SERPL-SCNC: 132 MMOL/L (ref 136–144)
WBC # BLD AUTO: 5.5 X10(3) UL (ref 4–11)

## 2019-01-29 PROCEDURE — 85025 COMPLETE CBC W/AUTO DIFF WBC: CPT

## 2019-01-29 PROCEDURE — 83615 LACTATE (LD) (LDH) ENZYME: CPT

## 2019-01-29 PROCEDURE — 96365 THER/PROPH/DIAG IV INF INIT: CPT

## 2019-01-29 PROCEDURE — 80053 COMPREHEN METABOLIC PANEL: CPT

## 2019-01-29 RX ORDER — 0.9 % SODIUM CHLORIDE 0.9 %
VIAL (ML) INJECTION
Status: DISCONTINUED
Start: 2019-01-29 | End: 2019-01-29

## 2019-01-29 RX ORDER — HEPARIN SODIUM (PORCINE) LOCK FLUSH IV SOLN 100 UNIT/ML 100 UNIT/ML
SOLUTION INTRAVENOUS
Status: COMPLETED
Start: 2019-01-29 | End: 2019-01-29

## 2019-01-29 RX ORDER — HEPARIN SODIUM (PORCINE) LOCK FLUSH IV SOLN 100 UNIT/ML 100 UNIT/ML
5 SOLUTION INTRAVENOUS ONCE
Status: COMPLETED | OUTPATIENT
Start: 2019-01-29 | End: 2019-01-29

## 2019-01-29 RX ORDER — SODIUM CHLORIDE 9 MG/ML
INJECTION, SOLUTION INTRAVENOUS
Status: DISCONTINUED
Start: 2019-01-29 | End: 2019-01-29

## 2019-01-29 RX ORDER — HEPARIN SODIUM (PORCINE) LOCK FLUSH IV SOLN 100 UNIT/ML 100 UNIT/ML
5 SOLUTION INTRAVENOUS ONCE
Status: CANCELLED | OUTPATIENT
Start: 2019-01-29

## 2019-01-29 RX ORDER — 0.9 % SODIUM CHLORIDE 0.9 %
10 VIAL (ML) INJECTION ONCE
Status: CANCELLED | OUTPATIENT
Start: 2019-01-29

## 2019-01-29 RX ADMIN — HEPARIN SODIUM (PORCINE) LOCK FLUSH IV SOLN 100 UNIT/ML 500 UNITS: 100 SOLUTION INTRAVENOUS at 10:23:00

## 2019-01-29 NOTE — PROGRESS NOTES
Pt here for daily abx for UTI s/p prostate biopsy. No Complaints. Continues to work, requests to have port accessed and de-accessed each day due to working. Discharged stable, ambulating independently, aware of future appointments.     Has yet to schedu

## 2019-01-30 ENCOUNTER — OFFICE VISIT (OUTPATIENT)
Dept: HEMATOLOGY/ONCOLOGY | Facility: HOSPITAL | Age: 64
End: 2019-01-30
Attending: EMERGENCY MEDICINE
Payer: COMMERCIAL

## 2019-01-30 VITALS
DIASTOLIC BLOOD PRESSURE: 55 MMHG | TEMPERATURE: 98 F | OXYGEN SATURATION: 98 % | RESPIRATION RATE: 18 BRPM | SYSTOLIC BLOOD PRESSURE: 117 MMHG | HEART RATE: 57 BPM

## 2019-01-30 DIAGNOSIS — C83.30 DLBCL (DIFFUSE LARGE B CELL LYMPHOMA) (HCC): ICD-10-CM

## 2019-01-30 DIAGNOSIS — Z16.12 ESBL (EXTENDED SPECTRUM BETA-LACTAMASE) PRODUCING BACTERIA INFECTION: Primary | ICD-10-CM

## 2019-01-30 DIAGNOSIS — Z95.828 PORT-A-CATH IN PLACE: ICD-10-CM

## 2019-01-30 DIAGNOSIS — A49.9 ESBL (EXTENDED SPECTRUM BETA-LACTAMASE) PRODUCING BACTERIA INFECTION: Primary | ICD-10-CM

## 2019-01-30 PROCEDURE — 96365 THER/PROPH/DIAG IV INF INIT: CPT

## 2019-01-30 RX ORDER — 0.9 % SODIUM CHLORIDE 0.9 %
VIAL (ML) INJECTION
Status: DISCONTINUED
Start: 2019-01-30 | End: 2019-01-30

## 2019-01-30 RX ORDER — SODIUM CHLORIDE 9 MG/ML
INJECTION, SOLUTION INTRAVENOUS
Status: DISCONTINUED
Start: 2019-01-30 | End: 2019-01-30

## 2019-01-30 RX ORDER — HEPARIN SODIUM (PORCINE) LOCK FLUSH IV SOLN 100 UNIT/ML 100 UNIT/ML
5 SOLUTION INTRAVENOUS ONCE
Status: COMPLETED | OUTPATIENT
Start: 2019-01-30 | End: 2019-01-30

## 2019-01-30 RX ORDER — HEPARIN SODIUM (PORCINE) LOCK FLUSH IV SOLN 100 UNIT/ML 100 UNIT/ML
SOLUTION INTRAVENOUS
Status: COMPLETED
Start: 2019-01-30 | End: 2019-01-30

## 2019-01-30 RX ORDER — 0.9 % SODIUM CHLORIDE 0.9 %
10 VIAL (ML) INJECTION ONCE
Status: CANCELLED | OUTPATIENT
Start: 2019-01-30

## 2019-01-30 RX ORDER — HEPARIN SODIUM (PORCINE) LOCK FLUSH IV SOLN 100 UNIT/ML 100 UNIT/ML
5 SOLUTION INTRAVENOUS ONCE
Status: CANCELLED | OUTPATIENT
Start: 2019-01-30

## 2019-01-30 RX ADMIN — HEPARIN SODIUM (PORCINE) LOCK FLUSH IV SOLN 100 UNIT/ML 500 UNITS: 100 SOLUTION INTRAVENOUS at 13:25:00

## 2019-01-30 NOTE — PROGRESS NOTES
Pt here for daily abx for UTI s/p prostate biopsy. No Complaints.   Continues to work, requests to have port accessed and de-accessed each day due to working.     Discharged stable, ambulating independently, aware of future appointments.     Has yet to sche

## 2019-01-31 ENCOUNTER — OFFICE VISIT (OUTPATIENT)
Dept: HEMATOLOGY/ONCOLOGY | Facility: HOSPITAL | Age: 64
End: 2019-01-31
Attending: EMERGENCY MEDICINE
Payer: COMMERCIAL

## 2019-01-31 ENCOUNTER — TELEPHONE (OUTPATIENT)
Dept: FAMILY MEDICINE CLINIC | Facility: CLINIC | Age: 64
End: 2019-01-31

## 2019-01-31 VITALS
SYSTOLIC BLOOD PRESSURE: 130 MMHG | HEIGHT: 70 IN | RESPIRATION RATE: 16 BRPM | OXYGEN SATURATION: 99 % | BODY MASS INDEX: 26.63 KG/M2 | DIASTOLIC BLOOD PRESSURE: 82 MMHG | WEIGHT: 186 LBS | HEART RATE: 58 BPM | TEMPERATURE: 98 F

## 2019-01-31 VITALS
OXYGEN SATURATION: 99 % | WEIGHT: 186 LBS | SYSTOLIC BLOOD PRESSURE: 130 MMHG | TEMPERATURE: 98 F | HEIGHT: 70 IN | BODY MASS INDEX: 26.63 KG/M2 | RESPIRATION RATE: 16 BRPM | DIASTOLIC BLOOD PRESSURE: 82 MMHG | HEART RATE: 58 BPM

## 2019-01-31 DIAGNOSIS — I10 ESSENTIAL HYPERTENSION: ICD-10-CM

## 2019-01-31 DIAGNOSIS — Z16.12 ESBL (EXTENDED SPECTRUM BETA-LACTAMASE) PRODUCING BACTERIA INFECTION: Primary | ICD-10-CM

## 2019-01-31 DIAGNOSIS — C83.39 DIFFUSE LARGE B-CELL LYMPHOMA OF SOLID ORGAN EXCLUDING SPLEEN (HCC): Primary | ICD-10-CM

## 2019-01-31 DIAGNOSIS — C83.30 DLBCL (DIFFUSE LARGE B CELL LYMPHOMA) (HCC): ICD-10-CM

## 2019-01-31 DIAGNOSIS — C85.99 LYMPHOMA OF TESTIS (HCC): ICD-10-CM

## 2019-01-31 DIAGNOSIS — A49.9 ESBL (EXTENDED SPECTRUM BETA-LACTAMASE) PRODUCING BACTERIA INFECTION: Primary | ICD-10-CM

## 2019-01-31 DIAGNOSIS — Z95.828 PORT-A-CATH IN PLACE: ICD-10-CM

## 2019-01-31 PROCEDURE — 99213 OFFICE O/P EST LOW 20 MIN: CPT | Performed by: INTERNAL MEDICINE

## 2019-01-31 PROCEDURE — 96365 THER/PROPH/DIAG IV INF INIT: CPT

## 2019-01-31 RX ORDER — SODIUM CHLORIDE 9 MG/ML
INJECTION, SOLUTION INTRAVENOUS
Status: DISCONTINUED
Start: 2019-01-31 | End: 2019-01-31

## 2019-01-31 RX ORDER — 0.9 % SODIUM CHLORIDE 0.9 %
10 VIAL (ML) INJECTION ONCE
Status: CANCELLED | OUTPATIENT
Start: 2019-01-31

## 2019-01-31 RX ORDER — HEPARIN SODIUM (PORCINE) LOCK FLUSH IV SOLN 100 UNIT/ML 100 UNIT/ML
5 SOLUTION INTRAVENOUS ONCE
Status: CANCELLED | OUTPATIENT
Start: 2019-01-31

## 2019-01-31 RX ORDER — LOSARTAN POTASSIUM AND HYDROCHLOROTHIAZIDE 12.5; 5 MG/1; MG/1
1 TABLET ORAL DAILY
Qty: 10 TABLET | Refills: 0 | Status: SHIPPED | OUTPATIENT
Start: 2019-01-31 | End: 2019-02-11

## 2019-01-31 RX ORDER — HEPARIN SODIUM (PORCINE) LOCK FLUSH IV SOLN 100 UNIT/ML 100 UNIT/ML
5 SOLUTION INTRAVENOUS ONCE
Status: COMPLETED | OUTPATIENT
Start: 2019-01-31 | End: 2019-01-31

## 2019-01-31 RX ORDER — HEPARIN SODIUM (PORCINE) LOCK FLUSH IV SOLN 100 UNIT/ML 100 UNIT/ML
SOLUTION INTRAVENOUS
Status: COMPLETED
Start: 2019-01-31 | End: 2019-01-31

## 2019-01-31 RX ORDER — 0.9 % SODIUM CHLORIDE 0.9 %
VIAL (ML) INJECTION
Status: DISCONTINUED
Start: 2019-01-31 | End: 2019-01-31

## 2019-01-31 RX ORDER — LOSARTAN POTASSIUM AND HYDROCHLOROTHIAZIDE 12.5; 5 MG/1; MG/1
1 TABLET ORAL DAILY
Qty: 30 TABLET | Refills: 0 | Status: SHIPPED | OUTPATIENT
Start: 2019-01-31 | End: 2019-02-11

## 2019-01-31 RX ADMIN — HEPARIN SODIUM (PORCINE) LOCK FLUSH IV SOLN 100 UNIT/ML 500 UNITS: 100 SOLUTION INTRAVENOUS at 12:10:00

## 2019-01-31 NOTE — PROGRESS NOTES
Pt to infusion for daily Invanz for ESBL urine. Arrived ambulating independently. Pt reports not feeling well, but states he feels better since starting infusions. Today is last infusion. Pt states he is planning to call Dr. Tony Quiros office for f/u.  Inf

## 2019-01-31 NOTE — PROGRESS NOTES
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 Disp: 180 capsule Rfl: 3   Losartan Potassium-HCTZ 50-12.5 MG Oral Tab Take 1 tablet by mouth daily.  Disp: 90 tablet Rfl: 0   Fenofibrate 145 use: Yes        Frequency: 2-3 times a week        Drinks per session: 1 or 2        Binge frequency: Weekly        Comment: occasionally 1-2 drinks on weekends      Drug use: No      Sexual activity: Not on file    Other Topics      Concerns:        Abhi exhibits no tenderness. Right chest port    Abdominal: Soft. Bowel sounds are normal. He exhibits no distension. There is no tenderness. Musculoskeletal: Normal range of motion.    Lymphadenopathy:        Head (right side): No submental, no submandibula guidelines with CBC, CMP and LDH every 3 months for 2 yrs and CT scan every 6 months for 2 yrs (from October 2017 to October 2019).       Next due October 2018. Patient encouraged to have now. Yrs 3-5 labs q 6 mo, then yearly.  H+P each visit.     Luis Carbon Dioxide, Total      22 - 32 mmol/L 21 (L) 26   BUN      8 - 20 mg/dL 15 15   CREATININE      0.50 - 1.50 mg/dL 0.56 0.66   CALCIUM      8.5 - 10.5 mg/dL 8.8 9.8   ALT (SGPT)      17 - 63 U/L 30 32   AST (SGOT)      15 - 41 U/L 28 27   ALKALINE LEDA

## 2019-02-09 ENCOUNTER — HOSPITAL ENCOUNTER (OUTPATIENT)
Dept: CT IMAGING | Facility: HOSPITAL | Age: 64
Discharge: HOME | End: 2019-02-09
Attending: INTERNAL MEDICINE
Payer: COMMERCIAL

## 2019-02-09 DIAGNOSIS — C83.39 DIFFUSE LARGE B-CELL LYMPHOMA OF SOLID ORGAN EXCLUDING SPLEEN (HCC): ICD-10-CM

## 2019-02-09 LAB — CREAT BLD-MCNC: 0.7 MG/DL (ref 0.5–1.5)

## 2019-02-09 PROCEDURE — 74177 CT ABD & PELVIS W/CONTRAST: CPT | Performed by: INTERNAL MEDICINE

## 2019-02-09 PROCEDURE — 82565 ASSAY OF CREATININE: CPT

## 2019-02-09 PROCEDURE — 71260 CT THORAX DX C+: CPT | Performed by: INTERNAL MEDICINE

## 2019-02-11 ENCOUNTER — TELEPHONE (OUTPATIENT)
Dept: FAMILY MEDICINE CLINIC | Facility: CLINIC | Age: 64
End: 2019-02-11

## 2019-02-11 ENCOUNTER — OFFICE VISIT (OUTPATIENT)
Dept: SURGERY | Facility: CLINIC | Age: 64
End: 2019-02-11

## 2019-02-11 VITALS — HEART RATE: 67 BPM | SYSTOLIC BLOOD PRESSURE: 135 MMHG | TEMPERATURE: 98 F | DIASTOLIC BLOOD PRESSURE: 85 MMHG

## 2019-02-11 DIAGNOSIS — R35.1 NOCTURIA: ICD-10-CM

## 2019-02-11 DIAGNOSIS — N41.0 ACUTE PROSTATITIS: ICD-10-CM

## 2019-02-11 DIAGNOSIS — R35.0 BENIGN PROSTATIC HYPERPLASIA WITH URINARY FREQUENCY: ICD-10-CM

## 2019-02-11 DIAGNOSIS — C61 PROSTATE CANCER (HCC): ICD-10-CM

## 2019-02-11 DIAGNOSIS — A49.8 INFECTION DUE TO ESBL-PRODUCING ESCHERICHIA COLI: Primary | ICD-10-CM

## 2019-02-11 DIAGNOSIS — Z16.12 INFECTION DUE TO ESBL-PRODUCING ESCHERICHIA COLI: Primary | ICD-10-CM

## 2019-02-11 DIAGNOSIS — I10 ESSENTIAL HYPERTENSION: ICD-10-CM

## 2019-02-11 DIAGNOSIS — N40.1 BENIGN PROSTATIC HYPERPLASIA WITH URINARY FREQUENCY: ICD-10-CM

## 2019-02-11 LAB
BACTERIA UR QL AUTO: NEGATIVE /HPF
BILIRUB UR QL: NEGATIVE
CLARITY UR: CLEAR
COLOR UR: YELLOW
GLUCOSE UR-MCNC: >=500 MG/DL
HGB UR QL STRIP.AUTO: NEGATIVE
KETONES UR-MCNC: NEGATIVE MG/DL
LEUKOCYTE ESTERASE UR QL STRIP.AUTO: NEGATIVE
NITRITE UR QL STRIP.AUTO: NEGATIVE
PH UR: 6 [PH] (ref 5–8)
PROT UR-MCNC: NEGATIVE MG/DL
RBC #/AREA URNS AUTO: 1 /HPF
SP GR UR STRIP: 1.01 (ref 1–1.03)
UROBILINOGEN UR STRIP-ACNC: <2
VIT C UR-MCNC: NEGATIVE MG/DL
WBC #/AREA URNS AUTO: <1 /HPF

## 2019-02-11 PROCEDURE — 99214 OFFICE O/P EST MOD 30 MIN: CPT | Performed by: UROLOGY

## 2019-02-11 PROCEDURE — 99212 OFFICE O/P EST SF 10 MIN: CPT | Performed by: UROLOGY

## 2019-02-11 RX ORDER — LOSARTAN POTASSIUM AND HYDROCHLOROTHIAZIDE 12.5; 5 MG/1; MG/1
1 TABLET ORAL DAILY
Qty: 8 TABLET | Refills: 0 | Status: SHIPPED | OUTPATIENT
Start: 2019-02-11 | End: 2019-02-18

## 2019-02-11 NOTE — PATIENT INSTRUCTIONS
Cosmo Vazquez M.D.      1.  Today urine culture and complete urinalysis--- to make sure that the E. coli ESBL has been eradicated and gone from your urinary tract : We will notify you of the results    2.   Continue tamsulosi

## 2019-02-11 NOTE — TELEPHONE ENCOUNTER
Patient is requesting refills on his bp medication. He is out of medication. Has an appointment scheduled for next Monday the 02/18. If he can get 1 week of medication.

## 2019-02-11 NOTE — PROGRESS NOTES
HPI:    Patient ID: Omar Chase is a 66year old male.     HPI     Voiding Dysfunction  Patient has current AUA score of 25, severe voiding dysfunction category, same compared to previous score of 25, severe voiding dysfunction category, on 11/20/201 ertapenem 1 gm started      Review of previous records:  9/13/16: Dr. Aliza Grullon: Wiley Malcolm testicular mass; right orchiectomy (outpatient); at [facility] 9/15/16. \"   9/29/16: Dr. Akua Browning: \"Crescencio 3+3 prostate cancer diagnosed 7/2015; on active surveillance PSA follow-ups and evaluation; message  from Dr. Cliffrod Gorman 10/1/18 early evening = Torres Poster had a complete response from his treatment and is currently in remission from his DLBCL.   Patient may proceed with either observation or further biopsy for treatment of t Take 1 tablet (145 mg total) by mouth daily. Disp: 90 tablet Rfl: 3   Tadalafil (CIALIS) 20 MG Oral Tab Take 1 tablet (20 mg total) by mouth daily as needed for Erectile Dysfunction.  Disp: 10 tablet Rfl: 2   Losartan Potassium-HCTZ 50-12.5 MG Oral Tab Take than left; soft; slight 1/4+ induration on the right lateral edge   Musculoskeletal: Normal range of motion. Neurological: He is alert and oriented to person, place, and time. Skin: Skin is dry. Psychiatric: He has a normal mood and affect.  Thought c mL     04/29/2018 CT Chest Abdomen Pelvis (all contrast) = kidneys normal; bladder decompressed; multilevel defenerative hcanefed of the visualized spine; surgical changes in the right inguinal region; fat containing left inguinal hernia. ; continued decrea on the right lateral edge.  Patient feels this problem is stable; I discussed, explained, and answered questions on treatment options and patient understood and chooses to continue observation.    (R35.1) Nocturia  Patient feels this problem is stable; I di

## 2019-02-14 ENCOUNTER — TELEPHONE (OUTPATIENT)
Dept: SURGERY | Facility: CLINIC | Age: 64
End: 2019-02-14

## 2019-02-14 NOTE — TELEPHONE ENCOUNTER
----- Message from DAMIAN Trujillo sent at 2/14/2019 10:15 AM CST -----  Staff,    Please let patient know his UA and urine culture are both normal.  His previous infection has been eradicated.   Plan for follow up with PVK in 6 months with a PSA

## 2019-02-14 NOTE — TELEPHONE ENCOUNTER
Spoke with pt and gave results and instructions in [facility] msg below and pt verbalized understanding and already has an appt in Aug.

## 2019-02-18 ENCOUNTER — OFFICE VISIT (OUTPATIENT)
Dept: FAMILY MEDICINE CLINIC | Facility: CLINIC | Age: 64
End: 2019-02-18

## 2019-02-18 VITALS
BODY MASS INDEX: 28 KG/M2 | HEART RATE: 66 BPM | OXYGEN SATURATION: 98 % | DIASTOLIC BLOOD PRESSURE: 70 MMHG | WEIGHT: 192 LBS | SYSTOLIC BLOOD PRESSURE: 126 MMHG

## 2019-02-18 DIAGNOSIS — I10 ESSENTIAL HYPERTENSION: ICD-10-CM

## 2019-02-18 DIAGNOSIS — IMO0001 UNCONTROLLED TYPE 2 DIABETES MELLITUS WITHOUT COMPLICATION, WITHOUT LONG-TERM CURRENT USE OF INSULIN: Primary | ICD-10-CM

## 2019-02-18 PROBLEM — Z23 NEED FOR INFLUENZA VACCINATION: Status: RESOLVED | Noted: 2018-10-30 | Resolved: 2019-02-18

## 2019-02-18 PROCEDURE — 99214 OFFICE O/P EST MOD 30 MIN: CPT

## 2019-02-18 RX ORDER — METFORMIN HYDROCHLORIDE 1000 MG/1
1000 TABLET, FILM COATED, EXTENDED RELEASE ORAL 2 TIMES DAILY WITH MEALS
Qty: 180 TABLET | Refills: 0 | Status: SHIPPED | OUTPATIENT
Start: 2019-02-18 | End: 2019-02-20 | Stop reason: ALTCHOICE

## 2019-02-18 RX ORDER — LOSARTAN POTASSIUM AND HYDROCHLOROTHIAZIDE 12.5; 5 MG/1; MG/1
1 TABLET ORAL DAILY
Qty: 90 TABLET | Refills: 0 | Status: SHIPPED | OUTPATIENT
Start: 2019-02-18 | End: 2019-05-28

## 2019-02-19 NOTE — PROGRESS NOTES
HPI:    Patient ID: Monty Chandra is a 66year old male. Diabetes   He presents for his follow-up diabetic visit. He has type 2 diabetes mellitus. Onset time: few years ago. His disease course has been worsening.  There are no hypoglycemic associate weight loss. HENT: Negative for congestion, ear pain, rhinorrhea and sore throat. Eyes: Negative for blurred vision, photophobia, discharge and visual disturbance. Respiratory: Negative for cough and shortness of breath.     Cardiovascular: Negative reviewed. ASSESSMENT/PLAN:   1. Uncontrolled type 2 diabetes mellitus without complication, without long-term current use of insulin ([facility] [address] [facility]  Pt reassured and all questions answered. Lab results reviewed and discussed with pt.   Latest Hgb A1c un

## 2019-02-19 NOTE — PATIENT INSTRUCTIONS
Cómo controlar el estrés cuando tiene diabetes    Acostumbrarse a vivir con akhil enfermedad crónica puede ser difícil. Es posible que se sienta enojo, tristeza o incluso miedo. Casimiro recuerde que estos sentimientos son normales.  De todas formas, Komal Castelan · Mientras respira profundamente, tense y relaje los músculos del cuerpo. Comience por los pies y siga con los músculos del dinorah del cuerpo hasta el shiv y la kaylee. · Imagínese en un lugar apacible, por ejemplo akhil playa. Toque la arena caliente.  Charlie Guerrero

## 2019-03-04 ENCOUNTER — TELEPHONE (OUTPATIENT)
Dept: FAMILY MEDICINE CLINIC | Facility: CLINIC | Age: 64
End: 2019-03-04

## 2019-03-04 ENCOUNTER — OFFICE VISIT (OUTPATIENT)
Dept: FAMILY MEDICINE CLINIC | Facility: CLINIC | Age: 64
End: 2019-03-04

## 2019-03-04 VITALS
WEIGHT: 193.38 LBS | BODY MASS INDEX: 28 KG/M2 | OXYGEN SATURATION: 95 % | DIASTOLIC BLOOD PRESSURE: 84 MMHG | HEART RATE: 94 BPM | SYSTOLIC BLOOD PRESSURE: 130 MMHG

## 2019-03-04 DIAGNOSIS — R51.9 HEADACHE DISORDER: Primary | ICD-10-CM

## 2019-03-04 DIAGNOSIS — M62.838 MUSCLE SPASMS OF NECK: ICD-10-CM

## 2019-03-04 PROCEDURE — 99213 OFFICE O/P EST LOW 20 MIN: CPT

## 2019-03-04 RX ORDER — CYCLOBENZAPRINE HCL 10 MG
10 TABLET ORAL 3 TIMES DAILY PRN
Qty: 15 TABLET | Refills: 0 | Status: SHIPPED | OUTPATIENT
Start: 2019-03-04 | End: 2019-03-09

## 2019-03-04 RX ORDER — BUTALBITAL, ASPIRIN, AND CAFFEINE 50; 325; 40 MG/1; MG/1; MG/1
1 CAPSULE ORAL EVERY 8 HOURS PRN
Qty: 30 CAPSULE | Refills: 0 | Status: SHIPPED | OUTPATIENT
Start: 2019-03-04 | End: 2019-07-17 | Stop reason: ALTCHOICE

## 2019-03-04 RX ORDER — METHYLPREDNISOLONE 4 MG/1
TABLET ORAL
Qty: 1 KIT | Refills: 0 | Status: SHIPPED | OUTPATIENT
Start: 2019-03-04 | End: 2019-03-09

## 2019-03-04 NOTE — TELEPHONE ENCOUNTER
Patient is calling asking to be seen today or what does Dr. Rosa Heredia recommend for him to take. He states he has been having a pain on the back of his head. Does not feel like a headache.  He states he had this symptoms before when he was doing radiation when h

## 2019-03-05 PROBLEM — M62.838 MUSCLE SPASMS OF NECK: Status: ACTIVE | Noted: 2019-03-05

## 2019-03-05 PROBLEM — R51.9 HEADACHE DISORDER: Status: ACTIVE | Noted: 2019-03-05

## 2019-03-05 NOTE — PROGRESS NOTES
HPI:    Patient ID: Connor Acosta is a 66year old male. Pain   This is a new (on neck area and back of his head) problem. Episode onset: 2 days ago. The problem occurs constantly. The problem has been unchanged.  Associated symptoms include headach -40 MG Oral Cap Take 1 capsule by mouth every 8 (eight) hours as needed for Pain or Headaches. Disp: 30 capsule Rfl: 0   metFORMIN HCl 1000 MG Oral Tab Take 1 tablet (1,000 mg total) by mouth 2 (two) times daily with meals.  Disp: 180 tablet Rfl: 0 the defined types were placed in this encounter.       Meds This Visit:  Requested Prescriptions     Signed Prescriptions Disp Refills   • Cyclobenzaprine HCl 10 MG Oral Tab 15 tablet 0     Sig: Take 1 tablet (10 mg total) by mouth 3 (three) times daily as

## 2019-03-27 ENCOUNTER — TELEPHONE (OUTPATIENT)
Dept: GASTROENTEROLOGY | Facility: CLINIC | Age: 64
End: 2019-03-27

## 2019-03-27 DIAGNOSIS — D64.9 ANEMIA, UNSPECIFIED TYPE: ICD-10-CM

## 2019-03-27 DIAGNOSIS — Z12.11 COLON CANCER SCREENING: Primary | ICD-10-CM

## 2019-03-27 NOTE — TELEPHONE ENCOUNTER
Surgery schedulers,    Please refer to TE from 09/12/18.  Please call the pt to reschedule colonoscopy

## 2019-04-04 RX ORDER — POLYETHYLENE GLYCOL 3350, SODIUM CHLORIDE, SODIUM BICARBONATE, POTASSIUM CHLORIDE 420; 11.2; 5.72; 1.48 G/4L; G/4L; G/4L; G/4L
POWDER, FOR SOLUTION ORAL
Qty: 1 BOTTLE | Refills: 0 | Status: SHIPPED | OUTPATIENT
Start: 2019-04-04 | End: 2019-05-28 | Stop reason: ALTCHOICE

## 2019-04-04 NOTE — TELEPHONE ENCOUNTER
Scheduled for:  Colonoscopy [address]32[address]  Provider Name: Dr. Michelle Wade  Date:  4/20/19  Location:  [facility]  Sedation:  IV  Time:   5145 (pt is aware to arrive at [address] [place] [address])   Prep:  Julianna, mailed new Kazakh instructions 4/5/19  Meds/Allergies Reconciled?:  Physician rene

## 2019-04-18 ENCOUNTER — TELEPHONE (OUTPATIENT)
Dept: GASTROENTEROLOGY | Facility: CLINIC | Age: 64
End: 2019-04-18

## 2019-04-18 NOTE — TELEPHONE ENCOUNTER
[employer] calling regarding auth for CLN octavio 4/20/19, Lico Hyatt does not show loc and procedure code, pls call asap at:[phone],thanks.

## 2019-04-18 NOTE — TELEPHONE ENCOUNTER
I added the procedure code and the location of the procedure for 04/20/19. I left a messaged for Santi.  I left my extension on  if he has any questions

## 2019-04-20 ENCOUNTER — HOSPITAL ENCOUNTER (OUTPATIENT)
Facility: HOSPITAL | Age: 64
Setting detail: HOSPITAL OUTPATIENT SURGERY
Discharge: HOME | End: 2019-04-20
Attending: INTERNAL MEDICINE | Admitting: INTERNAL MEDICINE
Payer: COMMERCIAL

## 2019-04-20 VITALS
SYSTOLIC BLOOD PRESSURE: 130 MMHG | RESPIRATION RATE: 18 BRPM | HEIGHT: 71 IN | DIASTOLIC BLOOD PRESSURE: 91 MMHG | OXYGEN SATURATION: 97 % | WEIGHT: 192 LBS | HEART RATE: 57 BPM | BODY MASS INDEX: 26.88 KG/M2

## 2019-04-20 DIAGNOSIS — Z12.11 COLON CANCER SCREENING: ICD-10-CM

## 2019-04-20 DIAGNOSIS — D64.9 ANEMIA, UNSPECIFIED TYPE: ICD-10-CM

## 2019-04-20 PROCEDURE — G0500 MOD SEDAT ENDO SERVICE >5YRS: HCPCS | Performed by: INTERNAL MEDICINE

## 2019-04-20 PROCEDURE — 0DBL8ZX EXCISION OF TRANSVERSE COLON, VIA NATURAL OR ARTIFICIAL OPENING ENDOSCOPIC, DIAGNOSTIC: ICD-10-PCS | Performed by: INTERNAL MEDICINE

## 2019-04-20 PROCEDURE — 45385 COLONOSCOPY W/LESION REMOVAL: CPT | Performed by: INTERNAL MEDICINE

## 2019-04-20 RX ORDER — SODIUM CHLORIDE 0.9 % (FLUSH) 0.9 %
10 SYRINGE (ML) INJECTION AS NEEDED
Status: DISCONTINUED | OUTPATIENT
Start: 2019-04-20 | End: 2019-04-20

## 2019-04-20 RX ORDER — MIDAZOLAM HYDROCHLORIDE 1 MG/ML
INJECTION INTRAMUSCULAR; INTRAVENOUS
Status: DISCONTINUED | OUTPATIENT
Start: 2019-04-20 | End: 2019-04-20

## 2019-04-20 RX ORDER — SODIUM CHLORIDE, SODIUM LACTATE, POTASSIUM CHLORIDE, CALCIUM CHLORIDE 600; 310; 30; 20 MG/100ML; MG/100ML; MG/100ML; MG/100ML
INJECTION, SOLUTION INTRAVENOUS CONTINUOUS
Status: DISCONTINUED | OUTPATIENT
Start: 2019-04-20 | End: 2019-04-20

## 2019-04-20 RX ORDER — MIDAZOLAM HYDROCHLORIDE 1 MG/ML
1 INJECTION INTRAMUSCULAR; INTRAVENOUS EVERY 5 MIN PRN
Status: DISCONTINUED | OUTPATIENT
Start: 2019-04-20 | End: 2019-04-20

## 2019-04-20 NOTE — OPERATIVE REPORT
COLONOSCOPY PROCEDURE REPORT     DATE OF PROCEDURE:  4/20/2019     PCP: Pacheco Garduno MD     PREOPERATIVE DIAGNOSIS: Screening colonoscopy examination     POSTOPERATIVE DIAGNOSIS:  See impression. SURGEON:  RICHARDSON Cueva:

## 2019-04-20 NOTE — H&P
History & Physical Examination    Patient Name: Adry Spencer  MRN: [id number]  CSN: [id number]  YOB: 1955    Diagnosis: Screening colonoscopy examination    Present Illness:     66year old patient of Dr. Mahendra Bowers with history of diffuse la meals. Disp: 180 tablet Rfl: 0 4/18/2019 at 1600   Losartan Potassium-HCTZ 50-12.5 MG Oral Tab Take 1 tablet by mouth daily. Disp: 90 tablet Rfl: 0 4/20/2019 at 0700   tamsulosin HCl 0.4 MG Oral Cap Take 2 capsules (0.8 mg total) by mouth daily.  Disp: 180 times a week      Drinks per session: 1 or 2      Binge frequency: Weekly      Comment: occasionally 1-2 drinks on weekends      SYSTEM Check if Review is Normal Check if Physical Exam is Normal If not normal, please explain:   MONICA [ ] Ct ]    NECK & FARIDA

## 2019-04-23 PROBLEM — K57.30 DIVERTICULOSIS OF LARGE INTESTINE WITHOUT HEMORRHAGE: Status: ACTIVE | Noted: 2019-04-20

## 2019-04-24 ENCOUNTER — OFFICE VISIT (OUTPATIENT)
Dept: HEMATOLOGY/ONCOLOGY | Facility: HOSPITAL | Age: 64
End: 2019-04-24
Attending: EMERGENCY MEDICINE
Payer: COMMERCIAL

## 2019-04-24 VITALS
SYSTOLIC BLOOD PRESSURE: 137 MMHG | WEIGHT: 186 LBS | TEMPERATURE: 98 F | BODY MASS INDEX: 26.63 KG/M2 | DIASTOLIC BLOOD PRESSURE: 85 MMHG | HEART RATE: 62 BPM | RESPIRATION RATE: 16 BRPM | HEIGHT: 70 IN | OXYGEN SATURATION: 99 %

## 2019-04-24 DIAGNOSIS — Z85.46 HISTORY OF PROSTATE CANCER: ICD-10-CM

## 2019-04-24 DIAGNOSIS — Z95.828 PORT-A-CATH IN PLACE: Primary | ICD-10-CM

## 2019-04-24 DIAGNOSIS — C83.39 DIFFUSE LARGE B-CELL LYMPHOMA OF SOLID ORGAN EXCLUDING SPLEEN (HCC): Primary | ICD-10-CM

## 2019-04-24 DIAGNOSIS — C83.30 DLBCL (DIFFUSE LARGE B CELL LYMPHOMA) (HCC): ICD-10-CM

## 2019-04-24 DIAGNOSIS — Z85.72 HISTORY OF B-CELL LYMPHOMA: ICD-10-CM

## 2019-04-24 PROCEDURE — 85025 COMPLETE CBC W/AUTO DIFF WBC: CPT

## 2019-04-24 PROCEDURE — 83615 LACTATE (LD) (LDH) ENZYME: CPT

## 2019-04-24 PROCEDURE — 36591 DRAW BLOOD OFF VENOUS DEVICE: CPT

## 2019-04-24 PROCEDURE — 80053 COMPREHEN METABOLIC PANEL: CPT

## 2019-04-24 PROCEDURE — 99213 OFFICE O/P EST LOW 20 MIN: CPT | Performed by: INTERNAL MEDICINE

## 2019-04-24 RX ORDER — HEPARIN SODIUM (PORCINE) LOCK FLUSH IV SOLN 100 UNIT/ML 100 UNIT/ML
5 SOLUTION INTRAVENOUS ONCE
Status: COMPLETED | OUTPATIENT
Start: 2019-04-24 | End: 2019-04-24

## 2019-04-24 RX ORDER — 0.9 % SODIUM CHLORIDE 0.9 %
VIAL (ML) INJECTION
Status: DISCONTINUED
Start: 2019-04-24 | End: 2019-04-24

## 2019-04-24 RX ORDER — HEPARIN SODIUM (PORCINE) LOCK FLUSH IV SOLN 100 UNIT/ML 100 UNIT/ML
5 SOLUTION INTRAVENOUS ONCE
Status: CANCELLED | OUTPATIENT
Start: 2019-04-25

## 2019-04-24 RX ORDER — 0.9 % SODIUM CHLORIDE 0.9 %
10 VIAL (ML) INJECTION ONCE
Status: CANCELLED | OUTPATIENT
Start: 2019-04-25

## 2019-04-24 RX ADMIN — HEPARIN SODIUM (PORCINE) LOCK FLUSH IV SOLN 100 UNIT/ML 500 UNITS: 100 SOLUTION INTRAVENOUS at 10:36:00

## 2019-04-24 NOTE — PROGRESS NOTES
MIK Benson is a 66year old male here for follow up of Diffuse large b-cell lymphoma of solid organ excluding spleen (hcc)  (primary encounter diagnosis)     S/p Cycle 6 RCHOP/ RT completed:   The patient was treated to the contralateral t Headaches. Disp: 30 capsule Rfl: 0   metFORMIN HCl 1000 MG Oral Tab Take 1 tablet (1,000 mg total) by mouth 2 (two) times daily with meals. Disp: 180 tablet Rfl: 0   Losartan Potassium-HCTZ 50-12.5 MG Oral Tab Take 1 tablet by mouth daily.  Disp: 90 tablet 1.00        Years: 35.00        Pack years: 28      Smokeless tobacco: Never Used      Tobacco comment: quit 3 yrs ago    Substance and Sexual Activity      Alcohol use: Yes        Frequency: 2-3 times a week        Drinks per session: 1 or 2        Binge Last 6 Encounters:  04/24/19 : 84.4 kg (186 lb)  04/20/19 : 87.1 kg (192 lb)  03/04/19 : 87.7 kg (193 lb 6.4 oz)  02/18/19 : 87.1 kg (192 lb)  01/31/19 : 84.4 kg (186 lb)  01/31/19 : 84.4 kg (186 lb)    Physical Exam   Constitutional: He is oriented to per Completed 4 doses of prophylactic IT MTX. Had persistent HA after IT chemo. Possible subdural hematoma. HA resolved.       On surveillance as per NCCN guidelines with CBC, CMP and LDH every 3 months for 2 yrs and CT scan every 6 months for 2 yrs (fro Neutrophil Absolute Prelim 3.12 1.50 - 7.70 x10 (3) uL    Neutrophil Absolute 3.12 1.50 - 7.70 x10(3) uL    Lymphocyte Absolute 2.63 1.00 - 4.00 x10(3) uL    Monocyte Absolute 0.33 0.10 - 1.00 x10(3) uL    Eosinophil Absolute 0.10 0.00 - 0.70 x10(3) uL CREATININE      0.50 - 1.50 mg/dL 0.56 0.66   CALCIUM      8.5 - 10.5 mg/dL 8.8 9.8   ALT (SGPT)      17 - 63 U/L 30 32   AST (SGOT)      15 - 41 U/L 28 27   ALKALINE PHOSPHATASE      32 - 100 U/L 43 51   Total Bilirubin      0.3 - 1.2 mg/dL 0.5 0.7   TO ABDOMEN AND PELVIS FINDINGS:   LIVER:  Hypodense parenchyma. A few ill-defined hyper enhancing foci present in the right lobe near the dome, favor hemangiomas or small vascular shunts. Similar finding present on comparison study.  No liver mass is identifie       04/20/2019 11:10 AM                                  MD                                                                            Ordering Location:   [facility]          Received:            04/20/2019 12:32 PM

## 2019-04-29 ENCOUNTER — TELEPHONE (OUTPATIENT)
Dept: GASTROENTEROLOGY | Facility: CLINIC | Age: 64
End: 2019-04-29

## 2019-05-18 ENCOUNTER — APPOINTMENT (OUTPATIENT)
Dept: LAB | Facility: HOSPITAL | Age: 64
End: 2019-05-18
Payer: COMMERCIAL

## 2019-05-18 DIAGNOSIS — IMO0001 UNCONTROLLED TYPE 2 DIABETES MELLITUS WITHOUT COMPLICATION, WITHOUT LONG-TERM CURRENT USE OF INSULIN: ICD-10-CM

## 2019-05-18 PROCEDURE — 36415 COLL VENOUS BLD VENIPUNCTURE: CPT

## 2019-05-18 PROCEDURE — 83036 HEMOGLOBIN GLYCOSYLATED A1C: CPT

## 2019-05-18 PROCEDURE — 80053 COMPREHEN METABOLIC PANEL: CPT

## 2019-05-28 ENCOUNTER — OFFICE VISIT (OUTPATIENT)
Dept: FAMILY MEDICINE CLINIC | Facility: CLINIC | Age: 64
End: 2019-05-28

## 2019-05-28 VITALS
OXYGEN SATURATION: 98 % | BODY MASS INDEX: 28 KG/M2 | WEIGHT: 192 LBS | HEART RATE: 56 BPM | DIASTOLIC BLOOD PRESSURE: 80 MMHG | SYSTOLIC BLOOD PRESSURE: 110 MMHG

## 2019-05-28 DIAGNOSIS — E78.1 PURE HYPERGLYCERIDEMIA: ICD-10-CM

## 2019-05-28 DIAGNOSIS — IMO0001 UNCONTROLLED TYPE 2 DIABETES MELLITUS WITHOUT COMPLICATION, WITHOUT LONG-TERM CURRENT USE OF INSULIN: Primary | ICD-10-CM

## 2019-05-28 DIAGNOSIS — I10 ESSENTIAL HYPERTENSION: ICD-10-CM

## 2019-05-28 DIAGNOSIS — C61 PROSTATE CANCER (HCC): ICD-10-CM

## 2019-05-28 DIAGNOSIS — Z01.89 ENCOUNTER FOR ROUTINE LABORATORY TESTING: ICD-10-CM

## 2019-05-28 PROBLEM — Z16.12 ESBL (EXTENDED SPECTRUM BETA-LACTAMASE) PRODUCING BACTERIA INFECTION: Status: RESOLVED | Noted: 2019-01-22 | Resolved: 2019-05-28

## 2019-05-28 PROBLEM — R51.9 HEADACHE DISORDER: Status: RESOLVED | Noted: 2019-03-05 | Resolved: 2019-05-28

## 2019-05-28 PROBLEM — A49.9 ESBL (EXTENDED SPECTRUM BETA-LACTAMASE) PRODUCING BACTERIA INFECTION: Status: RESOLVED | Noted: 2019-01-22 | Resolved: 2019-05-28

## 2019-05-28 PROBLEM — M62.838 MUSCLE SPASMS OF NECK: Status: RESOLVED | Noted: 2019-03-05 | Resolved: 2019-05-28

## 2019-05-28 PROCEDURE — 99214 OFFICE O/P EST MOD 30 MIN: CPT

## 2019-05-28 RX ORDER — LOSARTAN POTASSIUM AND HYDROCHLOROTHIAZIDE 12.5; 5 MG/1; MG/1
1 TABLET ORAL DAILY
Qty: 90 TABLET | Refills: 0 | Status: SHIPPED | OUTPATIENT
Start: 2019-05-28 | End: 2019-08-29

## 2019-05-29 ENCOUNTER — TELEPHONE (OUTPATIENT)
Dept: FAMILY MEDICINE CLINIC | Facility: CLINIC | Age: 64
End: 2019-05-29

## 2019-05-29 DIAGNOSIS — C61 PROSTATE CANCER (HCC): Primary | ICD-10-CM

## 2019-05-29 NOTE — TELEPHONE ENCOUNTER
Patient is requesting a refills for the following medication:    tamsulosin HCl 0.4 MG Oral Cap       Patient is requesting a referral to see Dr. Vincent Rodriguez- Urology. Please advise.

## 2019-05-29 NOTE — TELEPHONE ENCOUNTER
tamsulosin HCl 0.4 MG Oral Cap 180 capsule 3 1/18/2019 1/18/2020    Sig - Route:  Take 2 capsules (0.8 mg total) by mouth daily. - Oral    Sent to pharmacy as: Tamsulosin HCl 0.4 MG Oral Capsule    E-Prescribing Status: Receipt confirmed by pharmacy (1/18/2

## 2019-05-29 NOTE — PROGRESS NOTES
HPI:    Patient ID: aJir Ferraro is a 66year old male. Diabetes   He presents for his follow-up diabetic visit. He has type 2 diabetes mellitus. Onset time: few years ago. His disease course has been improving.  There are no hypoglycemic associate change and weight loss. HENT: Negative for congestion, ear pain, rhinorrhea and sore throat. Eyes: Negative for blurred vision, photophobia, discharge and visual disturbance. Respiratory: Negative for cough and shortness of breath.     Cardiovascular reviewed. ASSESSMENT/PLAN:   1. Uncontrolled type 2 diabetes mellitus without complication, without long-term current use of insulin ([facility] [address]  Pt reassured and all questions answered. Lab results reviewed and discussed with pt.   Latest Hgb A1c un

## 2019-05-29 NOTE — PATIENT INSTRUCTIONS
Recursos para las personas con diabetes    Vivir con diabetes significa hacer muchos cambios en muro hector, y esos cambios pueden parecer abrumadores. Es akhil reacción normal. Cuando se sienta deprimido, acuda a muro elizabeth y a ruba amigos.  Muro equipo de atenc servicios. [place] función es ayudarle. · [address] [place] [address] [phone]. diabetes. org  · National Diabetes Information Clearinghouse ([address] [place] [address])087-709-3010yso. diabetes. niddk.nih.gov · Asociaci

## 2019-07-17 ENCOUNTER — NURSE ONLY (OUTPATIENT)
Dept: HEMATOLOGY/ONCOLOGY | Facility: HOSPITAL | Age: 64
End: 2019-07-17
Attending: EMERGENCY MEDICINE
Payer: COMMERCIAL

## 2019-07-17 VITALS
TEMPERATURE: 98 F | DIASTOLIC BLOOD PRESSURE: 70 MMHG | RESPIRATION RATE: 18 BRPM | OXYGEN SATURATION: 98 % | WEIGHT: 191 LBS | HEART RATE: 68 BPM | HEIGHT: 70 IN | SYSTOLIC BLOOD PRESSURE: 132 MMHG | BODY MASS INDEX: 27.35 KG/M2

## 2019-07-17 DIAGNOSIS — Z95.828 PORT-A-CATH IN PLACE: Primary | ICD-10-CM

## 2019-07-17 DIAGNOSIS — Z85.72 HISTORY OF B-CELL LYMPHOMA: ICD-10-CM

## 2019-07-17 DIAGNOSIS — C83.30 DLBCL (DIFFUSE LARGE B CELL LYMPHOMA) (HCC): ICD-10-CM

## 2019-07-17 DIAGNOSIS — Z85.46 HISTORY OF PROSTATE CANCER: ICD-10-CM

## 2019-07-17 DIAGNOSIS — C83.39 DIFFUSE LARGE B-CELL LYMPHOMA OF SOLID ORGAN EXCLUDING SPLEEN (HCC): Primary | ICD-10-CM

## 2019-07-17 DIAGNOSIS — Z85.79 ENCOUNTER FOR FOLLOW-UP SURVEILLANCE OF DIFFUSE LARGE B-CELL LYMPHOMA: ICD-10-CM

## 2019-07-17 DIAGNOSIS — Z08 ENCOUNTER FOR FOLLOW-UP SURVEILLANCE OF DIFFUSE LARGE B-CELL LYMPHOMA: ICD-10-CM

## 2019-07-17 LAB
ALBUMIN SERPL-MCNC: 4.1 G/DL (ref 3.4–5)
ALBUMIN/GLOB SERPL: 1.2 {RATIO} (ref 1–2)
ALP LIVER SERPL-CCNC: 50 U/L (ref 45–117)
ALT SERPL-CCNC: 38 U/L (ref 16–61)
ANION GAP SERPL CALC-SCNC: 6 MMOL/L (ref 0–18)
AST SERPL-CCNC: 17 U/L (ref 15–37)
BASOPHILS # BLD AUTO: 0.05 X10(3) UL (ref 0–0.2)
BASOPHILS NFR BLD AUTO: 0.9 %
BILIRUB SERPL-MCNC: 0.2 MG/DL (ref 0.1–2)
BUN BLD-MCNC: 19 MG/DL (ref 7–18)
BUN/CREAT SERPL: 23.5 (ref 10–20)
CALCIUM BLD-MCNC: 9.1 MG/DL (ref 8.5–10.1)
CHLORIDE SERPL-SCNC: 107 MMOL/L (ref 98–112)
CO2 SERPL-SCNC: 28 MMOL/L (ref 21–32)
CREAT BLD-MCNC: 0.81 MG/DL (ref 0.7–1.3)
DEPRECATED RDW RBC AUTO: 45.6 FL (ref 35.1–46.3)
EOSINOPHIL # BLD AUTO: 0.19 X10(3) UL (ref 0–0.7)
EOSINOPHIL NFR BLD AUTO: 3.5 %
ERYTHROCYTE [DISTWIDTH] IN BLOOD BY AUTOMATED COUNT: 12.9 % (ref 11–15)
GLOBULIN PLAS-MCNC: 3.5 G/DL (ref 2.8–4.4)
GLUCOSE BLD-MCNC: 141 MG/DL (ref 70–99)
HCT VFR BLD AUTO: 36.5 % (ref 39–53)
HGB BLD-MCNC: 12.5 G/DL (ref 13–17.5)
IMM GRANULOCYTES # BLD AUTO: 0.01 X10(3) UL (ref 0–1)
IMM GRANULOCYTES NFR BLD: 0.2 %
LDH SERPL L TO P-CCNC: 146 U/L (ref 87–241)
LYMPHOCYTES # BLD AUTO: 2.74 X10(3) UL (ref 1–4)
LYMPHOCYTES NFR BLD AUTO: 50.9 %
M PROTEIN MFR SERPL ELPH: 7.6 G/DL (ref 6.4–8.2)
MCH RBC QN AUTO: 33.2 PG (ref 26–34)
MCHC RBC AUTO-ENTMCNC: 34.2 G/DL (ref 31–37)
MCV RBC AUTO: 96.8 FL (ref 80–100)
MONOCYTES # BLD AUTO: 0.32 X10(3) UL (ref 0.1–1)
MONOCYTES NFR BLD AUTO: 5.9 %
NEUTROPHILS # BLD AUTO: 2.07 X10 (3) UL (ref 1.5–7.7)
NEUTROPHILS # BLD AUTO: 2.07 X10(3) UL (ref 1.5–7.7)
NEUTROPHILS NFR BLD AUTO: 38.6 %
OSMOLALITY SERPL CALC.SUM OF ELEC: 297 MOSM/KG (ref 275–295)
PATIENT FASTING: NO
PLATELET # BLD AUTO: 224 10(3)UL (ref 150–450)
POTASSIUM SERPL-SCNC: 3.6 MMOL/L (ref 3.5–5.1)
RBC # BLD AUTO: 3.77 X10(6)UL (ref 4.3–5.7)
SODIUM SERPL-SCNC: 141 MMOL/L (ref 136–145)
WBC # BLD AUTO: 5.4 X10(3) UL (ref 4–11)

## 2019-07-17 PROCEDURE — 80053 COMPREHEN METABOLIC PANEL: CPT

## 2019-07-17 PROCEDURE — 99214 OFFICE O/P EST MOD 30 MIN: CPT | Performed by: INTERNAL MEDICINE

## 2019-07-17 PROCEDURE — 85025 COMPLETE CBC W/AUTO DIFF WBC: CPT

## 2019-07-17 PROCEDURE — 36591 DRAW BLOOD OFF VENOUS DEVICE: CPT

## 2019-07-17 PROCEDURE — 83615 LACTATE (LD) (LDH) ENZYME: CPT

## 2019-07-17 RX ORDER — 0.9 % SODIUM CHLORIDE 0.9 %
VIAL (ML) INJECTION
Status: DISCONTINUED
Start: 2019-07-17 | End: 2019-07-17

## 2019-07-17 RX ORDER — HEPARIN SODIUM (PORCINE) LOCK FLUSH IV SOLN 100 UNIT/ML 100 UNIT/ML
5 SOLUTION INTRAVENOUS ONCE
Status: DISCONTINUED | OUTPATIENT
Start: 2019-07-17 | End: 2019-07-17

## 2019-07-17 RX ORDER — HEPARIN SODIUM (PORCINE) LOCK FLUSH IV SOLN 100 UNIT/ML 100 UNIT/ML
5 SOLUTION INTRAVENOUS ONCE
Status: CANCELLED | OUTPATIENT
Start: 2019-10-01

## 2019-07-17 RX ORDER — 0.9 % SODIUM CHLORIDE 0.9 %
10 VIAL (ML) INJECTION ONCE
Status: CANCELLED | OUTPATIENT
Start: 2019-10-01

## 2019-07-17 NOTE — PROGRESS NOTES
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  Disp: 10 tablet Rfl: 2     Allergies:   No Known Allergies    Past Medical History:   Diagnosis Date   • Anesthesia complication     headache post lumbar puncture   • Diabetes ([facility] and oriented x 3, not in acute distress. HEENT: EOMs intact. PERRL. Oropharynx is clear. Neck: No JVD. No palpable lymphadenopathy. Neck is supple. Chest: Clear to auscultation. Heart: Regular rate and rhythm.    Abdomen: Soft, non tender with good bow 21.0 - 32.0 mmol/L    Anion Gap 6 0 - 18 mmol/L    BUN 19 (H) 7 - 18 mg/dL    Creatinine 0.81 0.70 - 1.30 mg/dL    BUN/CREA Ratio 23.5 (H) 10.0 - 20.0    Calcium, Total 9.1 8.5 - 10.1 mg/dL    Calculated Osmolality 297 (H) 275 - 295 mOsm/kg    GFR, Non-Afr

## 2019-08-24 ENCOUNTER — APPOINTMENT (OUTPATIENT)
Dept: LAB | Facility: HOSPITAL | Age: 64
End: 2019-08-24
Payer: COMMERCIAL

## 2019-08-24 DIAGNOSIS — IMO0001 UNCONTROLLED TYPE 2 DIABETES MELLITUS WITHOUT COMPLICATION, WITHOUT LONG-TERM CURRENT USE OF INSULIN: ICD-10-CM

## 2019-08-24 DIAGNOSIS — E78.1 PURE HYPERGLYCERIDEMIA: ICD-10-CM

## 2019-08-24 DIAGNOSIS — Z01.89 ENCOUNTER FOR ROUTINE LABORATORY TESTING: ICD-10-CM

## 2019-08-24 DIAGNOSIS — C61 PROSTATE CANCER (HCC): ICD-10-CM

## 2019-08-24 LAB
ALBUMIN SERPL-MCNC: 4.1 G/DL (ref 3.4–5)
ALBUMIN/GLOB SERPL: 1.1 {RATIO} (ref 1–2)
ALP LIVER SERPL-CCNC: 45 U/L (ref 45–117)
ALT SERPL-CCNC: 48 U/L (ref 16–61)
ANION GAP SERPL CALC-SCNC: 6 MMOL/L (ref 0–18)
AST SERPL-CCNC: 30 U/L (ref 15–37)
BACTERIA UR QL AUTO: NEGATIVE /HPF
BILIRUB SERPL-MCNC: 0.5 MG/DL (ref 0.1–2)
BILIRUB UR QL: NEGATIVE
BUN BLD-MCNC: 12 MG/DL (ref 7–18)
BUN/CREAT SERPL: 16.4 (ref 10–20)
CALCIUM BLD-MCNC: 9.1 MG/DL (ref 8.5–10.1)
CHLORIDE SERPL-SCNC: 104 MMOL/L (ref 98–112)
CHOLEST SMN-MCNC: 117 MG/DL (ref ?–200)
CLARITY UR: CLEAR
CO2 SERPL-SCNC: 29 MMOL/L (ref 21–32)
COLOR UR: YELLOW
CREAT BLD-MCNC: 0.73 MG/DL (ref 0.7–1.3)
CREAT UR-SCNC: 99.5 MG/DL
DEPRECATED RDW RBC AUTO: 45.1 FL (ref 35.1–46.3)
ERYTHROCYTE [DISTWIDTH] IN BLOOD BY AUTOMATED COUNT: 12.8 % (ref 11–15)
EST. AVERAGE GLUCOSE BLD GHB EST-MCNC: 163 MG/DL (ref 68–126)
GLOBULIN PLAS-MCNC: 3.6 G/DL (ref 2.8–4.4)
GLUCOSE BLD-MCNC: 126 MG/DL (ref 70–99)
GLUCOSE UR-MCNC: NEGATIVE MG/DL
HBA1C MFR BLD HPLC: 7.3 % (ref ?–5.7)
HCT VFR BLD AUTO: 38.9 % (ref 39–53)
HDLC SERPL-MCNC: 40 MG/DL (ref 40–59)
HGB BLD-MCNC: 13.3 G/DL (ref 13–17.5)
HGB UR QL STRIP.AUTO: NEGATIVE
KETONES UR-MCNC: NEGATIVE MG/DL
LDLC SERPL CALC-MCNC: 55 MG/DL (ref ?–100)
LEUKOCYTE ESTERASE UR QL STRIP.AUTO: NEGATIVE
M PROTEIN MFR SERPL ELPH: 7.7 G/DL (ref 6.4–8.2)
MCH RBC QN AUTO: 32.8 PG (ref 26–34)
MCHC RBC AUTO-ENTMCNC: 34.2 G/DL (ref 31–37)
MCV RBC AUTO: 95.8 FL (ref 80–100)
MICROALBUMIN UR-MCNC: 0.59 MG/DL
MICROALBUMIN/CREAT 24H UR-RTO: 5.9 UG/MG (ref ?–30)
NITRITE UR QL STRIP.AUTO: NEGATIVE
NONHDLC SERPL-MCNC: 77 MG/DL (ref ?–130)
OSMOLALITY SERPL CALC.SUM OF ELEC: 289 MOSM/KG (ref 275–295)
PATIENT FASTING: YES
PATIENT FASTING: YES
PH UR: 8 [PH] (ref 5–8)
PLATELET # BLD AUTO: 227 10(3)UL (ref 150–450)
POTASSIUM SERPL-SCNC: 4 MMOL/L (ref 3.5–5.1)
PROT UR-MCNC: NEGATIVE MG/DL
PSA FREE MFR SERPL: 13 %
PSA FREE SERPL-MCNC: 0.54 NG/ML
PSA SERPL-MCNC: 4.24 NG/ML (ref ?–4)
RBC # BLD AUTO: 4.06 X10(6)UL (ref 4.3–5.7)
RBC #/AREA URNS AUTO: 2 /HPF
SODIUM SERPL-SCNC: 139 MMOL/L (ref 136–145)
SP GR UR STRIP: 1.01 (ref 1–1.03)
TRIGL SERPL-MCNC: 111 MG/DL (ref 30–149)
UROBILINOGEN UR STRIP-ACNC: <2
VIT C UR-MCNC: NEGATIVE MG/DL
VLDLC SERPL CALC-MCNC: 22 MG/DL (ref 0–30)
WBC # BLD AUTO: 5.3 X10(3) UL (ref 4–11)
WBC #/AREA URNS AUTO: 1 /HPF

## 2019-08-24 PROCEDURE — 82043 UR ALBUMIN QUANTITATIVE: CPT

## 2019-08-24 PROCEDURE — 82570 ASSAY OF URINE CREATININE: CPT

## 2019-08-24 PROCEDURE — 83036 HEMOGLOBIN GLYCOSYLATED A1C: CPT

## 2019-08-24 PROCEDURE — 36415 COLL VENOUS BLD VENIPUNCTURE: CPT

## 2019-08-24 PROCEDURE — 84154 ASSAY OF PSA FREE: CPT

## 2019-08-24 PROCEDURE — 81003 URINALYSIS AUTO W/O SCOPE: CPT

## 2019-08-24 PROCEDURE — 80053 COMPREHEN METABOLIC PANEL: CPT

## 2019-08-24 PROCEDURE — 80061 LIPID PANEL: CPT

## 2019-08-24 PROCEDURE — 85027 COMPLETE CBC AUTOMATED: CPT

## 2019-08-24 PROCEDURE — 84153 ASSAY OF PSA TOTAL: CPT

## 2019-08-28 ENCOUNTER — OFFICE VISIT (OUTPATIENT)
Dept: FAMILY MEDICINE CLINIC | Facility: CLINIC | Age: 64
End: 2019-08-28

## 2019-08-28 ENCOUNTER — APPOINTMENT (OUTPATIENT)
Dept: HEMATOLOGY/ONCOLOGY | Facility: HOSPITAL | Age: 64
End: 2019-08-28
Attending: EMERGENCY MEDICINE
Payer: COMMERCIAL

## 2019-08-28 VITALS
SYSTOLIC BLOOD PRESSURE: 108 MMHG | WEIGHT: 188 LBS | OXYGEN SATURATION: 96 % | HEART RATE: 65 BPM | HEIGHT: 70 IN | DIASTOLIC BLOOD PRESSURE: 64 MMHG | BODY MASS INDEX: 26.92 KG/M2

## 2019-08-28 DIAGNOSIS — K57.30 DIVERTICULOSIS OF LARGE INTESTINE WITHOUT HEMORRHAGE: ICD-10-CM

## 2019-08-28 DIAGNOSIS — I10 ESSENTIAL HYPERTENSION: ICD-10-CM

## 2019-08-28 DIAGNOSIS — M25.552 CHRONIC LEFT HIP PAIN: ICD-10-CM

## 2019-08-28 DIAGNOSIS — K64.8 INTERNAL HEMORRHOIDS: ICD-10-CM

## 2019-08-28 DIAGNOSIS — Z00.01 ENCOUNTER FOR ROUTINE ADULT HEALTH EXAMINATION WITH ABNORMAL FINDINGS: Primary | ICD-10-CM

## 2019-08-28 DIAGNOSIS — E66.3 OVERWEIGHT (BMI 25.0-29.9): ICD-10-CM

## 2019-08-28 DIAGNOSIS — Z95.828 PORT-A-CATH IN PLACE: ICD-10-CM

## 2019-08-28 DIAGNOSIS — Z98.890 HISTORY OF COLONOSCOPY WITH POLYPECTOMY: ICD-10-CM

## 2019-08-28 DIAGNOSIS — H25.9 AGE-RELATED CATARACT OF BOTH EYES, UNSPECIFIED AGE-RELATED CATARACT TYPE: ICD-10-CM

## 2019-08-28 DIAGNOSIS — H04.123 DRY EYE SYNDROME OF BOTH EYES: ICD-10-CM

## 2019-08-28 DIAGNOSIS — Z87.891 FORMER SMOKER: ICD-10-CM

## 2019-08-28 DIAGNOSIS — IMO0001 UNCONTROLLED TYPE 2 DIABETES MELLITUS WITHOUT COMPLICATION, WITHOUT LONG-TERM CURRENT USE OF INSULIN: ICD-10-CM

## 2019-08-28 DIAGNOSIS — G89.29 CHRONIC LEFT HIP PAIN: ICD-10-CM

## 2019-08-28 DIAGNOSIS — E78.1 PURE HYPERGLYCERIDEMIA: ICD-10-CM

## 2019-08-28 DIAGNOSIS — C61 PROSTATE CANCER (HCC): ICD-10-CM

## 2019-08-28 DIAGNOSIS — C83.39 DIFFUSE LARGE B-CELL LYMPHOMA OF SOLID ORGAN EXCLUDING SPLEEN (HCC): ICD-10-CM

## 2019-08-28 DIAGNOSIS — Z86.010 HISTORY OF COLONOSCOPY WITH POLYPECTOMY: ICD-10-CM

## 2019-08-28 DIAGNOSIS — R39.11 URINARY HESITANCY: ICD-10-CM

## 2019-08-28 DIAGNOSIS — N52.01 ERECTILE DYSFUNCTION DUE TO ARTERIAL INSUFFICIENCY: ICD-10-CM

## 2019-08-28 DIAGNOSIS — R97.20 ELEVATED PSA MEASUREMENT: ICD-10-CM

## 2019-08-28 DIAGNOSIS — C85.99 LYMPHOMA OF TESTIS (HCC): ICD-10-CM

## 2019-08-28 PROCEDURE — 99396 PREV VISIT EST AGE 40-64: CPT

## 2019-08-28 PROCEDURE — 90732 PPSV23 VACC 2 YRS+ SUBQ/IM: CPT

## 2019-08-28 PROCEDURE — 90471 IMMUNIZATION ADMIN: CPT

## 2019-08-29 PROBLEM — N52.01 ERECTILE DYSFUNCTION DUE TO ARTERIAL INSUFFICIENCY: Status: ACTIVE | Noted: 2018-07-30

## 2019-08-29 RX ORDER — LOSARTAN POTASSIUM AND HYDROCHLOROTHIAZIDE 12.5; 5 MG/1; MG/1
1 TABLET ORAL DAILY
Qty: 90 TABLET | Refills: 1 | Status: SHIPPED | OUTPATIENT
Start: 2019-08-29 | End: 2020-02-25

## 2019-08-29 RX ORDER — TAMSULOSIN HYDROCHLORIDE 0.4 MG/1
0.8 CAPSULE ORAL DAILY
Qty: 180 CAPSULE | Refills: 3 | Status: SHIPPED | OUTPATIENT
Start: 2019-08-29 | End: 2020-03-20

## 2019-08-29 RX ORDER — TADALAFIL 20 MG/1
20 TABLET ORAL
Qty: 10 TABLET | Refills: 2 | COMMUNITY
Start: 2018-07-30 | End: 2021-02-01

## 2019-08-29 NOTE — PROGRESS NOTES
CC: Annual Physical Exam    HPI:   Xavier Adam is a 60year old male who presents for a complete physical exam. Pt denies any acute complaints at this time.     Wt Readings from Last 6 Encounters:  08/28/19 : 188 lb  07/17/19 : 191 lb  05/28/19 : 192 8.0 5.0 - 8.0    Protein Urine Negative Negative mg/dL    Glucose Urine Negative Negative mg/dL    Ketones Urine Negative Negative mg/dL    Bilirubin Urine Negative Negative    Blood Urine Negative Negative    Nitrite Urine Negative Negative    Urobilinoge observation by Urology      Past Surgical History:   Procedure Laterality Date   • COLONOSCOPY  2008    Fabián   • COLONOSCOPY N/A 4/20/2019    Performed by Becka Bobo MD at [place] [address]     • PORT, INDWELLING, nodes or history of splenectomy. PSYCHIATRIC:  Denies depression or anxiety. ENDOCRINOLOGIC:  Denies excessive sweating, cold or heat intolerance, polyuria or polydipsia.   ALLERGIES:  Denies allergic response, history of asthma, sneezing, hives, eczema o reflexes, bilateral monofilament/sensation of both feet is normal.    ASSESSMENT AND PLAN:   Omar Chase is a 60year old male who presents for a complete physical exam.   Health maintenance, will check: Orders Placed This Encounter      Comp Metabo testis ([facility] [address]  12. Elevated PSA measurement  13. [facility] ([facility]  - Stable and currently asymptomatic.  - Will continue watchful monitoring for now.   - Follow up with Dr. Jeanne Cherry (Oncology) and Dr. Mariah Regalado (Urology) at previously instructed interval.

## 2019-09-03 ENCOUNTER — TELEPHONE (OUTPATIENT)
Dept: HEMATOLOGY/ONCOLOGY | Facility: HOSPITAL | Age: 64
End: 2019-09-03

## 2019-09-03 NOTE — TELEPHONE ENCOUNTER
Returned phone call and clarified will need labs prior to MD appointment in October. Pt verbalizes understanding. Pt requested to change appointment for labs at 2 pm due to work schedule.

## 2019-09-03 NOTE — TELEPHONE ENCOUNTER
Patient scheduled a port flush for 9/11/19 and need to know if  Dr. Jodie Abarca would like him to have labs as well, please advise.

## 2019-09-11 ENCOUNTER — NURSE ONLY (OUTPATIENT)
Dept: HEMATOLOGY/ONCOLOGY | Facility: HOSPITAL | Age: 64
End: 2019-09-11
Attending: EMERGENCY MEDICINE
Payer: COMMERCIAL

## 2019-09-11 DIAGNOSIS — C83.30 DLBCL (DIFFUSE LARGE B CELL LYMPHOMA) (HCC): ICD-10-CM

## 2019-09-11 DIAGNOSIS — Z95.828 PORT-A-CATH IN PLACE: Primary | ICD-10-CM

## 2019-09-11 PROCEDURE — 96523 IRRIG DRUG DELIVERY DEVICE: CPT

## 2019-09-11 RX ORDER — 0.9 % SODIUM CHLORIDE 0.9 %
10 VIAL (ML) INJECTION ONCE
Status: DISCONTINUED | OUTPATIENT
Start: 2019-09-11 | End: 2019-09-11

## 2019-09-11 RX ORDER — 0.9 % SODIUM CHLORIDE 0.9 %
VIAL (ML) INJECTION
Status: DISCONTINUED
Start: 2019-09-11 | End: 2019-09-11

## 2019-09-11 RX ORDER — HEPARIN SODIUM (PORCINE) LOCK FLUSH IV SOLN 100 UNIT/ML 100 UNIT/ML
5 SOLUTION INTRAVENOUS ONCE
Status: COMPLETED | OUTPATIENT
Start: 2019-09-11 | End: 2019-09-11

## 2019-09-11 RX ORDER — 0.9 % SODIUM CHLORIDE 0.9 %
10 VIAL (ML) INJECTION ONCE
Status: CANCELLED | OUTPATIENT
Start: 2019-10-01

## 2019-09-11 RX ORDER — HEPARIN SODIUM (PORCINE) LOCK FLUSH IV SOLN 100 UNIT/ML 100 UNIT/ML
5 SOLUTION INTRAVENOUS ONCE
Status: CANCELLED | OUTPATIENT
Start: 2019-10-01

## 2019-09-11 RX ADMIN — HEPARIN SODIUM (PORCINE) LOCK FLUSH IV SOLN 100 UNIT/ML 500 UNITS: 100 SOLUTION INTRAVENOUS at 15:11:00

## 2019-10-05 ENCOUNTER — HOSPITAL ENCOUNTER (OUTPATIENT)
Dept: CT IMAGING | Facility: HOSPITAL | Age: 64
Discharge: HOME | End: 2019-10-05
Attending: INTERNAL MEDICINE
Payer: COMMERCIAL

## 2019-10-05 DIAGNOSIS — C83.39 DIFFUSE LARGE B-CELL LYMPHOMA OF SOLID ORGAN EXCLUDING SPLEEN (HCC): ICD-10-CM

## 2019-10-05 DIAGNOSIS — Z85.79 ENCOUNTER FOR FOLLOW-UP SURVEILLANCE OF DIFFUSE LARGE B-CELL LYMPHOMA: ICD-10-CM

## 2019-10-05 DIAGNOSIS — Z08 ENCOUNTER FOR FOLLOW-UP SURVEILLANCE OF DIFFUSE LARGE B-CELL LYMPHOMA: ICD-10-CM

## 2019-10-05 PROCEDURE — 82565 ASSAY OF CREATININE: CPT

## 2019-10-05 PROCEDURE — 74177 CT ABD & PELVIS W/CONTRAST: CPT | Performed by: INTERNAL MEDICINE

## 2019-10-05 PROCEDURE — 71260 CT THORAX DX C+: CPT | Performed by: INTERNAL MEDICINE

## 2019-10-08 ENCOUNTER — TELEPHONE (OUTPATIENT)
Dept: SURGERY | Facility: CLINIC | Age: 64
End: 2019-10-08

## 2019-10-08 NOTE — TELEPHONE ENCOUNTER
Spoke with pt and he informed that he does not have prostate pain, burning with urination, fever and chills, blood in his urine or cloudy foul smelling urine but has pain in his Rt.  Knee and along the side of the Rt Lower leg and is concerned that this cou

## 2019-10-08 NOTE — TELEPHONE ENCOUNTER
Pt states he is having prostate pain, asking for appointment soon, requesting to speak to RN. Please call thank you.

## 2019-10-09 ENCOUNTER — OFFICE VISIT (OUTPATIENT)
Dept: FAMILY MEDICINE CLINIC | Facility: CLINIC | Age: 64
End: 2019-10-09

## 2019-10-09 ENCOUNTER — HOSPITAL ENCOUNTER (OUTPATIENT)
Dept: GENERAL RADIOLOGY | Facility: HOSPITAL | Age: 64
Discharge: HOME | End: 2019-10-09
Payer: COMMERCIAL

## 2019-10-09 VITALS
SYSTOLIC BLOOD PRESSURE: 108 MMHG | DIASTOLIC BLOOD PRESSURE: 70 MMHG | HEART RATE: 61 BPM | BODY MASS INDEX: 27 KG/M2 | OXYGEN SATURATION: 97 % | WEIGHT: 188 LBS

## 2019-10-09 DIAGNOSIS — M79.661 PAIN OF RIGHT LOWER LEG: ICD-10-CM

## 2019-10-09 DIAGNOSIS — M79.661 PAIN OF RIGHT LOWER LEG: Primary | ICD-10-CM

## 2019-10-09 PROCEDURE — 73590 X-RAY EXAM OF LOWER LEG: CPT

## 2019-10-09 PROCEDURE — 99213 OFFICE O/P EST LOW 20 MIN: CPT

## 2019-10-09 RX ORDER — NAPROXEN 500 MG/1
500 TABLET ORAL 2 TIMES DAILY PRN
Qty: 60 TABLET | Refills: 0 | Status: SHIPPED | OUTPATIENT
Start: 2019-10-09 | End: 2019-10-18

## 2019-10-10 PROBLEM — M79.661 PAIN OF RIGHT LOWER LEG: Status: ACTIVE | Noted: 2019-10-10

## 2019-10-10 PROBLEM — Z00.01 ENCOUNTER FOR ROUTINE ADULT HEALTH EXAMINATION WITH ABNORMAL FINDINGS: Status: RESOLVED | Noted: 2017-07-18 | Resolved: 2019-10-10

## 2019-10-10 NOTE — PATIENT INSTRUCTIONS
Artralgia [Arthralgia]    Artralgia es el término para definir el dolor en o alrededor de las articulaciones. No es akhil enfermedad  sino un síntoma. Puede abarcar [place] o más articulaciones.  Algunas veces las artralgias se mudan de  articulación en articul

## 2019-10-10 NOTE — PROGRESS NOTES
HPI:    Patient ID: Cesar Jennings is a 60year old male. Leg Pain    The pain is present in the right lower leg. This is a new problem. The current episode started more than 1 month ago. There has been no history of extremity trauma.  The problem oc tamsulosin HCl 0.4 MG Oral Cap Take 2 capsules (0.8 mg total) by mouth daily. Disp: 180 capsule Rfl: 3   Tadalafil (CIALIS) 20 MG Oral Tab Take 1 tablet (20 mg total) by mouth daily as needed for Erectile Dysfunction.  Disp: 10 tablet Rfl: 2     Allergies

## 2019-10-11 ENCOUNTER — TELEPHONE (OUTPATIENT)
Dept: FAMILY MEDICINE CLINIC | Facility: CLINIC | Age: 64
End: 2019-10-11

## 2019-10-11 RX ORDER — METHYLPREDNISOLONE 4 MG/1
TABLET ORAL
Qty: 1 KIT | Refills: 0 | Status: SHIPPED | OUTPATIENT
Start: 2019-10-11 | End: 2019-10-18 | Stop reason: ALTCHOICE

## 2019-10-11 NOTE — TELEPHONE ENCOUNTER
Result Notes for XR TIBIA + FIBULA (2 VIEWS), RIGHT (CPT=73590)     Notes recorded by Kristin Birmingham MD on 10/10/2019 at 10:42 PM CDT  Results unremarkable, no changes to current medications; ok to file.                Results were discussed with patient

## 2019-10-11 NOTE — TELEPHONE ENCOUNTER
Pt called requesting results of his X-Ray done on 10/9/2019. He said it was okay to just leave him a detailed message if he doesn't answer. Please advise.

## 2019-10-14 NOTE — TELEPHONE ENCOUNTER
Please let patient know that I agree with recommendations given to him by our urology nurse Irasema Harp  ---  Patient has low risk prostate cancer  10/5/2019 CT ordered by Dr. Bill Luna shows arthritis of the spine and   NO evidence of prostate cancer spreading to his

## 2019-10-14 NOTE — TELEPHONE ENCOUNTER
SPoke with pt and gave all the info in PVK's msg below and pt stated that her saw Dr. Bruce Jackson on Friday and thAT [address].

## 2019-10-15 ENCOUNTER — TELEPHONE (OUTPATIENT)
Dept: FAMILY MEDICINE CLINIC | Facility: CLINIC | Age: 64
End: 2019-10-15

## 2019-10-15 PROBLEM — H43.399 VITREOUS FLOATERS: Status: ACTIVE | Noted: 2019-10-14

## 2019-10-16 ENCOUNTER — APPOINTMENT (OUTPATIENT)
Dept: HEMATOLOGY/ONCOLOGY | Facility: HOSPITAL | Age: 64
End: 2019-10-16
Attending: EMERGENCY MEDICINE
Payer: COMMERCIAL

## 2019-10-17 ENCOUNTER — OFFICE VISIT (OUTPATIENT)
Dept: HEMATOLOGY/ONCOLOGY | Facility: HOSPITAL | Age: 64
End: 2019-10-17
Attending: EMERGENCY MEDICINE
Payer: COMMERCIAL

## 2019-10-17 VITALS
RESPIRATION RATE: 18 BRPM | HEART RATE: 57 BPM | OXYGEN SATURATION: 97 % | TEMPERATURE: 98 F | DIASTOLIC BLOOD PRESSURE: 78 MMHG | SYSTOLIC BLOOD PRESSURE: 125 MMHG | HEIGHT: 70 IN | BODY MASS INDEX: 26.77 KG/M2 | WEIGHT: 187 LBS

## 2019-10-17 DIAGNOSIS — C83.30 DLBCL (DIFFUSE LARGE B CELL LYMPHOMA) (HCC): ICD-10-CM

## 2019-10-17 DIAGNOSIS — C83.39 DIFFUSE LARGE B-CELL LYMPHOMA OF SOLID ORGAN EXCLUDING SPLEEN (HCC): Primary | ICD-10-CM

## 2019-10-17 DIAGNOSIS — Z85.72 HISTORY OF B-CELL LYMPHOMA: ICD-10-CM

## 2019-10-17 DIAGNOSIS — Z95.828 PORT-A-CATH IN PLACE: ICD-10-CM

## 2019-10-17 DIAGNOSIS — Z95.828 PORT-A-CATH IN PLACE: Primary | ICD-10-CM

## 2019-10-17 DIAGNOSIS — Z85.46 HISTORY OF PROSTATE CANCER: ICD-10-CM

## 2019-10-17 PROCEDURE — 99213 OFFICE O/P EST LOW 20 MIN: CPT | Performed by: INTERNAL MEDICINE

## 2019-10-17 PROCEDURE — 85025 COMPLETE CBC W/AUTO DIFF WBC: CPT

## 2019-10-17 PROCEDURE — 83615 LACTATE (LD) (LDH) ENZYME: CPT

## 2019-10-17 PROCEDURE — 36591 DRAW BLOOD OFF VENOUS DEVICE: CPT

## 2019-10-17 PROCEDURE — 80053 COMPREHEN METABOLIC PANEL: CPT

## 2019-10-17 RX ORDER — HEPARIN SODIUM (PORCINE) LOCK FLUSH IV SOLN 100 UNIT/ML 100 UNIT/ML
5 SOLUTION INTRAVENOUS ONCE
Status: CANCELLED | OUTPATIENT
Start: 2020-01-01

## 2019-10-17 RX ORDER — HEPARIN SODIUM (PORCINE) LOCK FLUSH IV SOLN 100 UNIT/ML 100 UNIT/ML
5 SOLUTION INTRAVENOUS ONCE
Status: COMPLETED | OUTPATIENT
Start: 2019-10-17 | End: 2019-10-17

## 2019-10-17 RX ORDER — 0.9 % SODIUM CHLORIDE 0.9 %
10 VIAL (ML) INJECTION ONCE
Status: CANCELLED | OUTPATIENT
Start: 2020-01-01

## 2019-10-17 RX ORDER — 0.9 % SODIUM CHLORIDE 0.9 %
VIAL (ML) INJECTION
Status: DISCONTINUED
Start: 2019-10-17 | End: 2019-10-17

## 2019-10-17 RX ADMIN — HEPARIN SODIUM (PORCINE) LOCK FLUSH IV SOLN 100 UNIT/ML 500 UNITS: 100 SOLUTION INTRAVENOUS at 14:16:00

## 2019-10-17 NOTE — PROGRESS NOTES
MIK     Reina Ramirez is a 60year old male here for follow up of Diffuse large b-cell lymphoma of solid organ excluding spleen (hcc)  (primary encounter diagnosis)     S/p Cycle 6 RCHOP/ RT completed:   The patient was treated to the contralateral t (1,000 mg total) by mouth 2 (two) times daily with meals. , Disp: 180 tablet, Rfl: 1  tamsulosin HCl 0.4 MG Oral Cap, Take 2 capsules (0.8 mg total) by mouth daily. , Disp: 180 capsule, Rfl: 3  Tadalafil (CIALIS) 20 MG Oral Tab, Take 1 tablet (20 mg total) b (187 lb)   SpO2 97%   BMI 26.83 kg/m²   Wt Readings from Last 6 Encounters:  10/17/19 : 84.8 kg (187 lb)  10/09/19 : 85.3 kg (188 lb)  08/28/19 : 85.3 kg (188 lb)  07/17/19 : 86.6 kg (191 lb)  05/28/19 : 87.1 kg (192 lb)  04/24/19 : 84.4 kg (186 lb)    [place]fin From Past 48 Hours:  Recent Results (from the past 48 hour(s))   CBC W/ DIFFERENTIAL    Collection Time: 10/17/19  2:08 PM   Result Value Ref Range    WBC 8.0 4.0 - 11.0 x10(3) uL    RBC 3.90 (L) 4.30 - 5.70 x10(6)uL    HGB 13.0 13.0 - 17.5 g/dL    HCT 37. Use of iterative   reconstruction technique for dose reduction was used. Dose information is transmitted to the  [facility]) NRDR ([address]) which includes the Dose Index Registry.      FINDINGS:  CHEST FINDIN atherosclerotic calcification. No aneurysm or dissection. LYMPHADENOPATHY:  A stable 10 x 13 mm aortic caval lymph node on image 121 series 2. No other lymph node meeting size criteria for enlargement.   This lymph node remotely measured 12 x 18 mm in 2

## 2019-10-20 PROBLEM — H43.393 VITREOUS FLOATERS OF BOTH EYES: Status: ACTIVE | Noted: 2019-10-14

## 2019-10-22 ENCOUNTER — HOSPITAL ENCOUNTER (OUTPATIENT)
Dept: INTERVENTIONAL RADIOLOGY/VASCULAR | Facility: HOSPITAL | Age: 64
Discharge: HOME | End: 2019-10-22
Attending: INTERNAL MEDICINE | Admitting: INTERNAL MEDICINE
Payer: COMMERCIAL

## 2019-10-22 VITALS
SYSTOLIC BLOOD PRESSURE: 110 MMHG | BODY MASS INDEX: 26.18 KG/M2 | DIASTOLIC BLOOD PRESSURE: 71 MMHG | HEART RATE: 62 BPM | OXYGEN SATURATION: 95 % | WEIGHT: 187 LBS | HEIGHT: 71 IN | RESPIRATION RATE: 14 BRPM

## 2019-10-22 DIAGNOSIS — Z95.828 PORT-A-CATH IN PLACE: ICD-10-CM

## 2019-10-22 PROCEDURE — 0JPT0WZ REMOVAL OF TOTALLY IMPLANTABLE VASCULAR ACCESS DEVICE FROM TRUNK SUBCUTANEOUS TISSUE AND FASCIA, OPEN APPROACH: ICD-10-PCS | Performed by: RADIOLOGY

## 2019-10-22 PROCEDURE — 99152 MOD SED SAME PHYS/QHP 5/>YRS: CPT

## 2019-10-22 PROCEDURE — 82962 GLUCOSE BLOOD TEST: CPT

## 2019-10-22 PROCEDURE — 36590 REMOVAL TUNNELED CV CATH: CPT

## 2019-10-22 RX ORDER — MIDAZOLAM HYDROCHLORIDE 1 MG/ML
INJECTION INTRAMUSCULAR; INTRAVENOUS
Status: COMPLETED
Start: 2019-10-22 | End: 2019-10-22

## 2019-10-22 RX ORDER — BACITRACIN 50000 [USP'U]/1
INJECTION, POWDER, LYOPHILIZED, FOR SOLUTION INTRAMUSCULAR
Status: COMPLETED
Start: 2019-10-22 | End: 2019-10-22

## 2019-10-22 RX ORDER — SODIUM CHLORIDE 9 MG/ML
INJECTION, SOLUTION INTRAVENOUS ONCE
Status: DISCONTINUED | OUTPATIENT
Start: 2019-10-22 | End: 2019-10-22

## 2019-10-22 RX ORDER — LIDOCAINE HYDROCHLORIDE 20 MG/ML
INJECTION, SOLUTION EPIDURAL; INFILTRATION; INTRACAUDAL; PERINEURAL
Status: COMPLETED
Start: 2019-10-22 | End: 2019-10-22

## 2019-10-22 NOTE — H&P
History & Physical Examination    Patient Name: Aishwarya Garcia  MRN: [id number]  CSN: [id number]  YOB: 1955    Diagnosis: Lymphoma    Present Illness: 31-year-old, history of lymphoma status post chemotherapy, now in remission.   Port no l Tobacco Use      Smoking status: Former Smoker        Packs/day: 1.00        Years: 35.00        Pack years: 35      Smokeless tobacco: Never Used      Tobacco comment: quit 3 yrs ago    Alcohol use: Yes      Frequency: 2-3 times a week      Drinks per [facility] [place] [facility]

## 2019-10-22 NOTE — IVS NOTE
DISCHARGE NOTE     Pt is able to sit up and ambulate without difficulty. Pt voided and tolerated fluids and food. Procedural site remains dry and intact   No signs and symptoms of bleeding/hematoma noted.    Instruction provided, patient/family Ana Laura Riding

## 2019-11-03 DIAGNOSIS — M79.661 PAIN OF RIGHT LOWER LEG: ICD-10-CM

## 2019-11-04 RX ORDER — NAPROXEN 500 MG/1
TABLET ORAL
Qty: 60 TABLET | Refills: 0 | OUTPATIENT
Start: 2019-11-04

## 2019-11-10 ENCOUNTER — APPOINTMENT (OUTPATIENT)
Dept: LAB | Facility: HOSPITAL | Age: 64
End: 2019-11-10
Attending: UROLOGY
Payer: COMMERCIAL

## 2019-11-10 DIAGNOSIS — C61 PROSTATE CANCER (HCC): ICD-10-CM

## 2019-11-10 PROCEDURE — 84153 ASSAY OF PSA TOTAL: CPT

## 2019-11-10 PROCEDURE — 36415 COLL VENOUS BLD VENIPUNCTURE: CPT

## 2019-11-12 ENCOUNTER — OFFICE VISIT (OUTPATIENT)
Dept: SURGERY | Facility: CLINIC | Age: 64
End: 2019-11-12

## 2019-11-12 VITALS
TEMPERATURE: 98 F | WEIGHT: 190 LBS | SYSTOLIC BLOOD PRESSURE: 123 MMHG | DIASTOLIC BLOOD PRESSURE: 73 MMHG | HEIGHT: 71 IN | BODY MASS INDEX: 26.6 KG/M2 | HEART RATE: 63 BPM

## 2019-11-12 DIAGNOSIS — C61 PROSTATE CANCER (HCC): Primary | ICD-10-CM

## 2019-11-12 DIAGNOSIS — R35.1 NOCTURIA: ICD-10-CM

## 2019-11-12 DIAGNOSIS — R35.0 BENIGN PROSTATIC HYPERPLASIA WITH URINARY FREQUENCY: ICD-10-CM

## 2019-11-12 DIAGNOSIS — Z87.438 HISTORY OF PROSTATITIS: ICD-10-CM

## 2019-11-12 DIAGNOSIS — N40.1 BENIGN PROSTATIC HYPERPLASIA WITH URINARY FREQUENCY: ICD-10-CM

## 2019-11-12 PROCEDURE — 99214 OFFICE O/P EST MOD 30 MIN: CPT | Performed by: UROLOGY

## 2019-11-12 NOTE — PATIENT INSTRUCTIONS
Stevan Dick M.D.      1.  Continue tamsulosin 0.4 mg twice daily    2. Visit in 6 months. Please get blood draw for PSA and submit urine specimen for complete urinalysis 1--10 days before visit with me.   No sexual stimul

## 2019-11-12 NOTE — PROGRESS NOTES
HPI:    Patient ID: Reina Azul is a 60year old male. HPI  Prostate Cancer  Chronic. Problem started in [id number].   Crescencio 3+3 diagnosed by Dr. Akua Browning and has chosen active surveillance thus far. 1/13/19 cancer surveillance restaging biopsy show Patient had tried warm compress which helped and tried Advil which did not help.  On 1/16/19, patient began to get chills and pain with urination; on 1/17/19 patient reported R knee pain; on 1/17/19 patient reported an episode of being unable to postpone ur 10/1/18. 10/1/18: Office consult with me: prostate 2+ enlarged; right lobe larger than left, soft; slight 1/4+ induration on the right' prostate cancer nikole 3+3 biopsy performed by Dr. Sridevi Curiel in [id number]; pt has thus far been on observation therapy; rig Constitutional: Negative for fever. HENT: Negative for voice change. Respiratory: Negative for chest tightness and shortness of breath. Positive for snoring   Cardiovascular: Negative for chest pain.    Gastrointestinal: Negative for abdomina 4/20/2019    Performed by Ritesh Paz MD at [facility] ENDOSCOPY   • INGUINAL HERNIA REPAIR     • PORT, INDWELLING, IMP     • REMOVAL TESTIS,SIMPLE        Family History   Problem Relation Age of Onset   • Heart Disease Mother    • Heart Disease Fat cancer.       LABORATORIES    11/10/2019 PSA = 4.50  8/24/2019 PSA = 4.24; UA WBC = 1, RBC = 2  01/29/2019 Creatinine = 0.56; eGFR = >60; Potassium 3.2  01/21/2019 UA RBC = 30; WBC = 11; Urine Culture = No growth 18-24 hours  1/18/19 UA WBC = 7.4; neutroph Crescencio (3+3) with a very small volume in each region. The patient also has history of b-cell lymphoma. On DOLLY, prostate 0-1+ enlarged, right lobe slightly larger than the left, slight 1/4+ induration on the right lateral edge.  PSA on 11/10/19 was 4.50, wo before the visit.          I explained to patient the risks, side effects, and alternatives, and I answered questions concerning them; patient understands all of this and decides to proceed with the following:       Treatment Plan & Patient Instructions

## 2019-11-25 NOTE — PROGRESS NOTES
Pt came here for port flush. Port accessed,good blood return,flushed with 20 ml ns and 500units of heparin and tolerated well. De accessed port. pt discharged home ambulatory with future appointment. No

## 2020-02-24 ENCOUNTER — TELEPHONE (OUTPATIENT)
Dept: FAMILY MEDICINE CLINIC | Facility: CLINIC | Age: 65
End: 2020-02-24

## 2020-02-24 DIAGNOSIS — I10 ESSENTIAL HYPERTENSION: ICD-10-CM

## 2020-02-24 NOTE — TELEPHONE ENCOUNTER
Pt called requesting partial refills for Losartan Potassium-HCTZ 50-12.5 MG Oral Tab. Pt wants to be informed when this is done.

## 2020-02-25 RX ORDER — LOSARTAN POTASSIUM AND HYDROCHLOROTHIAZIDE 12.5; 5 MG/1; MG/1
1 TABLET ORAL DAILY
Qty: 30 TABLET | Refills: 0 | Status: SHIPPED | OUTPATIENT
Start: 2020-02-25 | End: 2020-03-20

## 2020-03-19 ENCOUNTER — TELEPHONE (OUTPATIENT)
Dept: FAMILY MEDICINE CLINIC | Facility: CLINIC | Age: 65
End: 2020-03-19

## 2020-03-19 DIAGNOSIS — E11.65 UNCONTROLLED TYPE 2 DIABETES MELLITUS, WITHOUT LONG-TERM CURRENT USE OF INSULIN (HCC): ICD-10-CM

## 2020-03-19 DIAGNOSIS — R39.11 URINARY HESITANCY: ICD-10-CM

## 2020-03-19 DIAGNOSIS — I10 ESSENTIAL HYPERTENSION: ICD-10-CM

## 2020-03-19 NOTE — TELEPHONE ENCOUNTER
Pt called to r/s his appt, New appt will be July 8.  Pt needs refills for Losartan Potassium-HCTZ 50-12.5 MG Oral Tab  metFORMIN HCl 1000 MG Oral Tab  tamsulosin HCl 0.4 MG Oral Cap    Pt has not been in contact with someone sick  Not traveled, and has no s

## 2020-03-20 RX ORDER — LOSARTAN POTASSIUM AND HYDROCHLOROTHIAZIDE 12.5; 5 MG/1; MG/1
1 TABLET ORAL DAILY
Qty: 30 TABLET | Refills: 0 | Status: SHIPPED | OUTPATIENT
Start: 2020-03-20 | End: 2020-04-20

## 2020-03-20 RX ORDER — TAMSULOSIN HYDROCHLORIDE 0.4 MG/1
0.8 CAPSULE ORAL DAILY
Qty: 60 CAPSULE | Refills: 0 | Status: SHIPPED | OUTPATIENT
Start: 2020-03-20 | End: 2020-04-20

## 2020-03-21 DIAGNOSIS — I10 ESSENTIAL HYPERTENSION: ICD-10-CM

## 2020-03-23 RX ORDER — LOSARTAN POTASSIUM AND HYDROCHLOROTHIAZIDE 12.5; 5 MG/1; MG/1
1 TABLET ORAL DAILY
Qty: 30 TABLET | Refills: 0 | OUTPATIENT
Start: 2020-03-23

## 2020-04-11 ENCOUNTER — APPOINTMENT (OUTPATIENT)
Dept: LAB | Facility: REFERENCE LAB | Age: 65
End: 2020-04-11
Payer: COMMERCIAL

## 2020-04-11 DIAGNOSIS — IMO0001 UNCONTROLLED TYPE 2 DIABETES MELLITUS WITHOUT COMPLICATION, WITHOUT LONG-TERM CURRENT USE OF INSULIN: ICD-10-CM

## 2020-04-11 PROCEDURE — 36415 COLL VENOUS BLD VENIPUNCTURE: CPT

## 2020-04-11 PROCEDURE — 83036 HEMOGLOBIN GLYCOSYLATED A1C: CPT

## 2020-04-11 PROCEDURE — 80053 COMPREHEN METABOLIC PANEL: CPT

## 2020-04-20 ENCOUNTER — VIRTUAL PHONE E/M (OUTPATIENT)
Dept: FAMILY MEDICINE CLINIC | Facility: CLINIC | Age: 65
End: 2020-04-20

## 2020-04-20 DIAGNOSIS — I10 ESSENTIAL HYPERTENSION: Primary | ICD-10-CM

## 2020-04-20 DIAGNOSIS — Z00.00 HEALTHCARE MAINTENANCE: ICD-10-CM

## 2020-04-20 DIAGNOSIS — E11.65 UNCONTROLLED TYPE 2 DIABETES MELLITUS, WITHOUT LONG-TERM CURRENT USE OF INSULIN (HCC): ICD-10-CM

## 2020-04-20 DIAGNOSIS — R39.11 URINARY HESITANCY: ICD-10-CM

## 2020-04-20 PROCEDURE — 99213 OFFICE O/P EST LOW 20 MIN: CPT | Performed by: FAMILY MEDICINE

## 2020-04-20 RX ORDER — LOSARTAN POTASSIUM AND HYDROCHLOROTHIAZIDE 12.5; 5 MG/1; MG/1
1 TABLET ORAL DAILY
Qty: 90 TABLET | Refills: 0 | Status: SHIPPED | OUTPATIENT
Start: 2020-04-20 | End: 2020-08-04

## 2020-04-20 RX ORDER — TAMSULOSIN HYDROCHLORIDE 0.4 MG/1
0.8 CAPSULE ORAL DAILY
Qty: 180 CAPSULE | Refills: 0 | Status: SHIPPED | OUTPATIENT
Start: 2020-04-20 | End: 2020-08-04

## 2020-04-20 NOTE — PROGRESS NOTES
Virtual Telephone Check-In    Kelly High verbally consents to a Virtual/Telephone Check-In visit on 04/16/20. Patient understands and accepts financial responsibility for any deductible, co-insurance and/or co-pays associated with this service. Future            Follow-up in 3 month  Reasurrance and education provided. All questions answered. Red flags/ ER precautions discussed. The patient understands and accepts the limitations of the use of telemedicine.   Given the current Coronavirus [place]

## 2020-04-21 ENCOUNTER — OFFICE VISIT (OUTPATIENT)
Dept: HEMATOLOGY/ONCOLOGY | Facility: HOSPITAL | Age: 65
End: 2020-04-21
Attending: EMERGENCY MEDICINE
Payer: COMMERCIAL

## 2020-04-21 VITALS
BODY MASS INDEX: 26.92 KG/M2 | WEIGHT: 188 LBS | TEMPERATURE: 97 F | OXYGEN SATURATION: 97 % | SYSTOLIC BLOOD PRESSURE: 142 MMHG | HEIGHT: 70 IN | DIASTOLIC BLOOD PRESSURE: 86 MMHG | RESPIRATION RATE: 18 BRPM | HEART RATE: 60 BPM

## 2020-04-21 DIAGNOSIS — Z85.72 HISTORY OF DIFFUSE LARGE B-CELL LYMPHOMA: ICD-10-CM

## 2020-04-21 DIAGNOSIS — Z08 ENCOUNTER FOR FOLLOW-UP SURVEILLANCE OF DIFFUSE LARGE B-CELL LYMPHOMA: ICD-10-CM

## 2020-04-21 DIAGNOSIS — C61 PROSTATE CANCER (HCC): ICD-10-CM

## 2020-04-21 DIAGNOSIS — C83.39 DIFFUSE LARGE B-CELL LYMPHOMA OF SOLID ORGAN EXCLUDING SPLEEN (HCC): ICD-10-CM

## 2020-04-21 DIAGNOSIS — C83.39 DIFFUSE LARGE B-CELL LYMPHOMA OF SOLID ORGAN EXCLUDING SPLEEN (HCC): Primary | ICD-10-CM

## 2020-04-21 DIAGNOSIS — R35.1 NOCTURIA: ICD-10-CM

## 2020-04-21 DIAGNOSIS — Z85.79 ENCOUNTER FOR FOLLOW-UP SURVEILLANCE OF DIFFUSE LARGE B-CELL LYMPHOMA: ICD-10-CM

## 2020-04-21 PROCEDURE — 81001 URINALYSIS AUTO W/SCOPE: CPT

## 2020-04-21 PROCEDURE — 99213 OFFICE O/P EST LOW 20 MIN: CPT | Performed by: INTERNAL MEDICINE

## 2020-04-21 PROCEDURE — 36415 COLL VENOUS BLD VENIPUNCTURE: CPT

## 2020-04-21 PROCEDURE — 84153 ASSAY OF PSA TOTAL: CPT

## 2020-04-21 PROCEDURE — 80053 COMPREHEN METABOLIC PANEL: CPT

## 2020-04-21 PROCEDURE — 85025 COMPLETE CBC W/AUTO DIFF WBC: CPT

## 2020-04-21 PROCEDURE — 83615 LACTATE (LD) (LDH) ENZYME: CPT

## 2020-04-21 NOTE — PROGRESS NOTES
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 90 tablet 0   • metFORMIN HCl 1000 MG Oral Tab Take 1 tablet (1,000 mg total) by mouth 2 (two) times daily with meals.  180 tablet 0   • tamsulosin (FLOMAX) cap Take 2 capsules (0.8 m Pulse 60   Temp 97 °F (36.1 °C) (Oral)   Resp 18   Ht 1.778 m (5' 10\")   Wt 85.3 kg (188 lb)   SpO2 97%   BMI 26.98 kg/m²   Wt Readings from Last 6 Encounters:  04/21/20 : 85.3 kg (188 lb)  11/12/19 : 86.2 kg (190 lb)  10/18/19 : 84.8 kg (187 lb)  10/17/1 yearly. May have port removed. Patient to have colonoscopy as last was 10 yrs ago. Completed 4/20/19, transverse colon removal one hyperplastic polyp. No orders of the defined types were placed in this encounter.       Results From Past 48 Hours:

## 2020-05-12 ENCOUNTER — TELEPHONE (OUTPATIENT)
Dept: SURGERY | Facility: CLINIC | Age: 65
End: 2020-05-12

## 2020-05-12 DIAGNOSIS — N39.0 URINARY TRACT INFECTION WITHOUT HEMATURIA, SITE UNSPECIFIED: ICD-10-CM

## 2020-05-12 DIAGNOSIS — R97.20 ELEVATED PSA: ICD-10-CM

## 2020-05-12 DIAGNOSIS — R31.29 MICROHEMATURIA: Primary | ICD-10-CM

## 2020-05-12 NOTE — TELEPHONE ENCOUNTER
Pt rescheduled 5/13 appointment to 9/15, pt asking if Dr. Amy Pereira would like him to get new labs done. Please advise thank you. Polish speaker.

## 2020-05-14 NOTE — TELEPHONE ENCOUNTER
Please call patient back with Tanzanian speaker --  2 - 10 days before visit of 9/15/20 ,   To get blood draw for PSA-- total and free and submit urine specimen for complete UA, urine culture and urine for cytology.   Patient may eat before blood draw, Vineet Dumont

## 2020-06-01 ENCOUNTER — TELEPHONE (OUTPATIENT)
Dept: FAMILY MEDICINE CLINIC | Facility: CLINIC | Age: 65
End: 2020-06-01

## 2020-06-01 NOTE — TELEPHONE ENCOUNTER
Patient states he feels weak and not eating well. He states he has lost a lot of weight. Had diarrhea about a week ago but is better. No fevers or vomiting. He wants to know what can he do or take. Please advice.

## 2020-06-01 NOTE — TELEPHONE ENCOUNTER
Patient called back. Per patient due to COVID exposure at work he was tested for COVID-19 on 03/26/2020 results were negative, he stayed home for a week due to employer request and went back to work today.  Per patient he had diarrhea for a few days, nazario

## 2020-06-05 ENCOUNTER — TELEPHONE (OUTPATIENT)
Dept: FAMILY MEDICINE CLINIC | Facility: CLINIC | Age: 65
End: 2020-06-05

## 2020-06-16 ENCOUNTER — APPOINTMENT (OUTPATIENT)
Dept: HEMATOLOGY/ONCOLOGY | Facility: HOSPITAL | Age: 65
End: 2020-06-16
Attending: EMERGENCY MEDICINE
Payer: COMMERCIAL

## 2020-07-24 ENCOUNTER — LAB ENCOUNTER (OUTPATIENT)
Dept: LAB | Facility: HOSPITAL | Age: 65
End: 2020-07-24
Attending: FAMILY MEDICINE
Payer: COMMERCIAL

## 2020-07-24 DIAGNOSIS — C83.39 DIFFUSE LARGE B-CELL LYMPHOMA OF SOLID ORGAN EXCLUDING SPLEEN (HCC): ICD-10-CM

## 2020-07-24 DIAGNOSIS — Z00.00 HEALTHCARE MAINTENANCE: ICD-10-CM

## 2020-07-24 DIAGNOSIS — E11.65 UNCONTROLLED TYPE 2 DIABETES MELLITUS, WITHOUT LONG-TERM CURRENT USE OF INSULIN (HCC): ICD-10-CM

## 2020-07-24 LAB
ALBUMIN SERPL-MCNC: 3.7 G/DL (ref 3.4–5)
ALBUMIN/GLOB SERPL: 1.1 {RATIO} (ref 1–2)
ALP LIVER SERPL-CCNC: 55 U/L (ref 45–117)
ALT SERPL-CCNC: 42 U/L (ref 16–61)
ANION GAP SERPL CALC-SCNC: 6 MMOL/L (ref 0–18)
AST SERPL-CCNC: 21 U/L (ref 15–37)
BASOPHILS # BLD AUTO: 0.06 X10(3) UL (ref 0–0.2)
BASOPHILS NFR BLD AUTO: 1.1 %
BILIRUB SERPL-MCNC: 0.6 MG/DL (ref 0.1–2)
BUN BLD-MCNC: 12 MG/DL (ref 7–18)
BUN/CREAT SERPL: 19 (ref 10–20)
CALCIUM BLD-MCNC: 8.8 MG/DL (ref 8.5–10.1)
CHLORIDE SERPL-SCNC: 105 MMOL/L (ref 98–112)
CHOLEST SMN-MCNC: 138 MG/DL (ref ?–200)
CO2 SERPL-SCNC: 29 MMOL/L (ref 21–32)
CREAT BLD-MCNC: 0.63 MG/DL (ref 0.7–1.3)
DEPRECATED RDW RBC AUTO: 45.4 FL (ref 35.1–46.3)
EOSINOPHIL # BLD AUTO: 0.17 X10(3) UL (ref 0–0.7)
EOSINOPHIL NFR BLD AUTO: 3.1 %
ERYTHROCYTE [DISTWIDTH] IN BLOOD BY AUTOMATED COUNT: 12.9 % (ref 11–15)
EST. AVERAGE GLUCOSE BLD GHB EST-MCNC: 148 MG/DL (ref 68–126)
GLOBULIN PLAS-MCNC: 3.5 G/DL (ref 2.8–4.4)
GLUCOSE BLD-MCNC: 131 MG/DL (ref 70–99)
HBA1C MFR BLD HPLC: 6.8 % (ref ?–5.7)
HCT VFR BLD AUTO: 37.6 % (ref 39–53)
HDLC SERPL-MCNC: 46 MG/DL (ref 40–59)
HGB BLD-MCNC: 13.1 G/DL (ref 13–17.5)
IMM GRANULOCYTES # BLD AUTO: 0 X10(3) UL (ref 0–1)
IMM GRANULOCYTES NFR BLD: 0 %
LDH SERPL L TO P-CCNC: 158 U/L
LDLC SERPL CALC-MCNC: 67 MG/DL (ref ?–100)
LYMPHOCYTES # BLD AUTO: 2.87 X10(3) UL (ref 1–4)
LYMPHOCYTES NFR BLD AUTO: 52.7 %
M PROTEIN MFR SERPL ELPH: 7.2 G/DL (ref 6.4–8.2)
MCH RBC QN AUTO: 33.2 PG (ref 26–34)
MCHC RBC AUTO-ENTMCNC: 34.8 G/DL (ref 31–37)
MCV RBC AUTO: 95.4 FL (ref 80–100)
MONOCYTES # BLD AUTO: 0.35 X10(3) UL (ref 0.1–1)
MONOCYTES NFR BLD AUTO: 6.4 %
NEUTROPHILS # BLD AUTO: 2 X10 (3) UL (ref 1.5–7.7)
NEUTROPHILS # BLD AUTO: 2 X10(3) UL (ref 1.5–7.7)
NEUTROPHILS NFR BLD AUTO: 36.7 %
NONHDLC SERPL-MCNC: 92 MG/DL (ref ?–130)
OSMOLALITY SERPL CALC.SUM OF ELEC: 292 MOSM/KG (ref 275–295)
PATIENT FASTING Y/N/NP: YES
PATIENT FASTING Y/N/NP: YES
PLATELET # BLD AUTO: 206 10(3)UL (ref 150–450)
POTASSIUM SERPL-SCNC: 3.9 MMOL/L (ref 3.5–5.1)
RBC # BLD AUTO: 3.94 X10(6)UL (ref 4.3–5.7)
SODIUM SERPL-SCNC: 140 MMOL/L (ref 136–145)
TRIGL SERPL-MCNC: 124 MG/DL (ref 30–149)
VLDLC SERPL CALC-MCNC: 25 MG/DL (ref 0–30)
WBC # BLD AUTO: 5.5 X10(3) UL (ref 4–11)

## 2020-07-24 PROCEDURE — 36415 COLL VENOUS BLD VENIPUNCTURE: CPT

## 2020-07-24 PROCEDURE — 83036 HEMOGLOBIN GLYCOSYLATED A1C: CPT

## 2020-07-24 PROCEDURE — 85025 COMPLETE CBC W/AUTO DIFF WBC: CPT

## 2020-07-24 PROCEDURE — 83615 LACTATE (LD) (LDH) ENZYME: CPT

## 2020-07-24 PROCEDURE — 80053 COMPREHEN METABOLIC PANEL: CPT

## 2020-07-24 PROCEDURE — 80061 LIPID PANEL: CPT

## 2020-07-29 ENCOUNTER — OFFICE VISIT (OUTPATIENT)
Dept: FAMILY MEDICINE CLINIC | Facility: CLINIC | Age: 65
End: 2020-07-29

## 2020-07-29 VITALS
SYSTOLIC BLOOD PRESSURE: 114 MMHG | BODY MASS INDEX: 26.34 KG/M2 | HEIGHT: 70 IN | DIASTOLIC BLOOD PRESSURE: 70 MMHG | WEIGHT: 184 LBS | HEART RATE: 61 BPM | OXYGEN SATURATION: 98 %

## 2020-07-29 DIAGNOSIS — E11.8 CONTROLLED TYPE 2 DIABETES MELLITUS WITH COMPLICATION, WITHOUT LONG-TERM CURRENT USE OF INSULIN (HCC): ICD-10-CM

## 2020-07-29 DIAGNOSIS — I10 ESSENTIAL HYPERTENSION: Primary | ICD-10-CM

## 2020-07-29 PROCEDURE — 3008F BODY MASS INDEX DOCD: CPT | Performed by: INTERNAL MEDICINE

## 2020-07-29 PROCEDURE — 3074F SYST BP LT 130 MM HG: CPT | Performed by: INTERNAL MEDICINE

## 2020-07-29 PROCEDURE — 99214 OFFICE O/P EST MOD 30 MIN: CPT | Performed by: INTERNAL MEDICINE

## 2020-07-29 PROCEDURE — 3078F DIAST BP <80 MM HG: CPT | Performed by: INTERNAL MEDICINE

## 2020-08-02 DIAGNOSIS — I10 ESSENTIAL HYPERTENSION: ICD-10-CM

## 2020-08-02 DIAGNOSIS — IMO0002 UNCONTROLLED TYPE 2 DIABETES MELLITUS, WITHOUT LONG-TERM CURRENT USE OF INSULIN: ICD-10-CM

## 2020-08-02 DIAGNOSIS — R39.11 URINARY HESITANCY: ICD-10-CM

## 2020-08-04 RX ORDER — LOSARTAN POTASSIUM AND HYDROCHLOROTHIAZIDE 12.5; 5 MG/1; MG/1
1 TABLET ORAL DAILY
Qty: 90 TABLET | Refills: 1 | Status: SHIPPED | OUTPATIENT
Start: 2020-08-04 | End: 2021-02-01

## 2020-08-04 RX ORDER — TAMSULOSIN HYDROCHLORIDE 0.4 MG/1
CAPSULE ORAL
Qty: 180 CAPSULE | Refills: 1 | Status: SHIPPED | OUTPATIENT
Start: 2020-08-04 | End: 2021-02-01

## 2020-08-04 NOTE — PROGRESS NOTES
[facility]  Return Patient Progress Note      HPI:   Patient presents with:  Physical  Medication Request      Omar Chase is a 60year old male presenting for: medication refills.       Has a significant  has a past medical hi VLDL 25 0 - 30 mg/dL    Non HDL Chol 92 <130 mg/dL    FASTING Yes    CBC W/ DIFFERENTIAL   Result Value Ref Range    WBC 5.5 4.0 - 11.0 x10(3) uL    RBC 3.94 (L) 4.30 - 5.70 x10(6)uL    HGB 13.1 13.0 - 17.5 g/dL    HCT 37.6 (L) 39.0 - 53.0 %    MCV 95. 4 CAPSULES BY MOUTH DAILY 180 capsule 1   • LOSARTAN POTASSIUM-HCTZ 50-12.5 MG Oral Tab TAKE 1 TABLET BY MOUTH DAILY 90 tablet 1   • Tadalafil (CIALIS) 20 MG Oral Tab Take 1 tablet (20 mg total) by mouth daily as needed for Erectile Dysfunction.  10 tablet 2 Eyes: Negative for pain, redness and visual disturbance. Respiratory: Negative for apnea, cough, chest tightness, shortness of breath and wheezing. Cardiovascular: Negative for chest pain, palpitations and leg swelling.    Gastrointestinal: Negative normal, S2 normal, normal heart sounds and intact distal pulses. Exam reveals no gallop and no friction rub. No murmur heard. Edema not present. Pulmonary/Chest: Effort normal and breath sounds normal. No respiratory distress. He has no wheezes.  He Adult(2 of 3 - PCV13) due on 08/28/2020  Diabetes Care Dilated Eye Exam due on 10/14/2020  Annual Depression Screen due on 10/17/2020  Influenza Vaccine(1) due on 09/01/2020  Diabetes Care A1C due on 01/24/2021  LDL Control due on 07/24/2021  PSA due on 04

## 2020-08-06 ENCOUNTER — TELEPHONE (OUTPATIENT)
Dept: FAMILY MEDICINE CLINIC | Facility: CLINIC | Age: 65
End: 2020-08-06

## 2020-08-06 NOTE — TELEPHONE ENCOUNTER
----- Message from Sita Bentley MD sent at 8/5/2020 11:09 AM CDT -----  Please call patient and inquire if he agrees to start lipitor 40 mg daily.  He is diabetic, and his lipid panel is very well controlled as well as his diabetes, however, based on f

## 2020-08-06 NOTE — TELEPHONE ENCOUNTER
[facility] Left voicemail to call office back to discuss cholesterol levels and possibly starting statin therapy.

## 2020-09-11 ENCOUNTER — LAB ENCOUNTER (OUTPATIENT)
Dept: LAB | Facility: HOSPITAL | Age: 65
End: 2020-09-11
Attending: UROLOGY
Payer: COMMERCIAL

## 2020-09-11 DIAGNOSIS — N39.0 URINARY TRACT INFECTION WITHOUT HEMATURIA, SITE UNSPECIFIED: ICD-10-CM

## 2020-09-11 DIAGNOSIS — R31.29 MICROHEMATURIA: ICD-10-CM

## 2020-09-11 DIAGNOSIS — R97.20 ELEVATED PSA: ICD-10-CM

## 2020-09-11 LAB
BILIRUB UR QL: NEGATIVE
COLOR UR: YELLOW
GLUCOSE UR-MCNC: NEGATIVE MG/DL
KETONES UR-MCNC: NEGATIVE MG/DL
LEUKOCYTE ESTERASE UR QL STRIP.AUTO: NEGATIVE
NITRITE UR QL STRIP.AUTO: NEGATIVE
PH UR: 7 [PH] (ref 5–8)
PROT UR-MCNC: NEGATIVE MG/DL
PSA SERPL-MCNC: 3.89 NG/ML (ref ?–4)
RBC #/AREA URNS AUTO: 3 /HPF
SP GR UR STRIP: 1.02 (ref 1–1.03)
UROBILINOGEN UR STRIP-ACNC: 2
WBC #/AREA URNS AUTO: 3 /HPF

## 2020-09-11 PROCEDURE — 84153 ASSAY OF PSA TOTAL: CPT

## 2020-09-11 PROCEDURE — 87086 URINE CULTURE/COLONY COUNT: CPT

## 2020-09-11 PROCEDURE — 81001 URINALYSIS AUTO W/SCOPE: CPT

## 2020-09-11 PROCEDURE — 36415 COLL VENOUS BLD VENIPUNCTURE: CPT

## 2020-09-11 PROCEDURE — 88108 CYTOPATH CONCENTRATE TECH: CPT

## 2020-09-15 ENCOUNTER — OFFICE VISIT (OUTPATIENT)
Dept: SURGERY | Facility: CLINIC | Age: 65
End: 2020-09-15

## 2020-09-15 ENCOUNTER — TELEPHONE (OUTPATIENT)
Dept: SURGERY | Facility: CLINIC | Age: 65
End: 2020-09-15

## 2020-09-15 ENCOUNTER — NURSE ONLY (OUTPATIENT)
Dept: FAMILY MEDICINE CLINIC | Facility: CLINIC | Age: 65
End: 2020-09-15

## 2020-09-15 VITALS
HEART RATE: 56 BPM | DIASTOLIC BLOOD PRESSURE: 71 MMHG | SYSTOLIC BLOOD PRESSURE: 123 MMHG | HEIGHT: 70 IN | WEIGHT: 184 LBS | BODY MASS INDEX: 26.34 KG/M2

## 2020-09-15 DIAGNOSIS — R31.29 OTHER MICROSCOPIC HEMATURIA: Primary | ICD-10-CM

## 2020-09-15 DIAGNOSIS — N40.1 BENIGN PROSTATIC HYPERPLASIA WITH URINARY FREQUENCY: ICD-10-CM

## 2020-09-15 DIAGNOSIS — R35.0 BENIGN PROSTATIC HYPERPLASIA WITH URINARY FREQUENCY: ICD-10-CM

## 2020-09-15 DIAGNOSIS — R35.1 NOCTURIA: ICD-10-CM

## 2020-09-15 DIAGNOSIS — N28.1 BILATERAL RENAL CYSTS: ICD-10-CM

## 2020-09-15 DIAGNOSIS — Z23 NEED FOR INFLUENZA VACCINATION: Primary | ICD-10-CM

## 2020-09-15 DIAGNOSIS — R31.29 MICROHEMATURIA: Primary | ICD-10-CM

## 2020-09-15 DIAGNOSIS — N42.89 PROSTATE INDURATION: ICD-10-CM

## 2020-09-15 DIAGNOSIS — Z87.438 HISTORY OF PROSTATITIS: ICD-10-CM

## 2020-09-15 DIAGNOSIS — C61 PROSTATE CANCER (HCC): ICD-10-CM

## 2020-09-15 PROCEDURE — 99214 OFFICE O/P EST MOD 30 MIN: CPT | Performed by: UROLOGY

## 2020-09-15 PROCEDURE — 3074F SYST BP LT 130 MM HG: CPT | Performed by: UROLOGY

## 2020-09-15 PROCEDURE — 3008F BODY MASS INDEX DOCD: CPT | Performed by: UROLOGY

## 2020-09-15 PROCEDURE — 90686 IIV4 VACC NO PRSV 0.5 ML IM: CPT | Performed by: INTERNAL MEDICINE

## 2020-09-15 PROCEDURE — 3078F DIAST BP <80 MM HG: CPT | Performed by: UROLOGY

## 2020-09-15 PROCEDURE — 90471 IMMUNIZATION ADMIN: CPT | Performed by: INTERNAL MEDICINE

## 2020-09-15 NOTE — PATIENT INSTRUCTIONS
Alexey Brunson M.D.      1.  Continue tamsulosin 0.4 mg twice daily    2. Bladder ultrasound--to investigate worsening in urination problems; we will notify you of the results    3.    CYSTOSCOPY WITH POSSIBLE HAILEE

## 2020-09-15 NOTE — PROGRESS NOTES
HPI:    Patient ID: Jenny Augustin is a 63year old male. HPI     Prostate Cancer  Chronic.  Diagnosed 2013 by Dr. Peyton Renteria; Crescencio 3+3 and chosen active surveillance thus far. 01/13/2019 cancer surveillance restaging biopsy shows small amount of Gleas caffeine daily, and 12 oz of alcohol in the evening. The patient does snore.      History of ESBL E Coli Prostatitis  S/p 01/14/2019 prostate biopsy. 01/15/2019 patient complained of pain with urination; associated vomiting and abdominal pain.  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 EF 63% and negative stress test.  10/01/2018 Office consult with me;  On DOLLY, prostate 2+ enlarged, R>L, soft; slight 1/4+ indurati 11/12/2019 Office visit with me;  On DOLLY, prostate 0-1+ enlarged, right lobe slightly larger than the left, slight 1/4+ induration on the right lateral edge; Continue tamsulosin 0.8 mg daily           Review of Systems   Constitutional: Negative for fever [address] [facility] ENDOSCOPY   • INGUINAL HERNIA REPAIR     • PORT, INDWELLING, IMP     • REMOVAL TESTIS,SIMPLE        Family History   Problem Relation Age of Onset   • Heart Disease Mother    • Heart Disease Father    • Breast Cancer Sister 36      Social History: Soci Bacteria = few   07/24/2020 Creatinine = 0.63 low (0.70-1.30); GFR = 106   04/21/2020 PSA = 4.09; Creatinine = 0.63 low (0.70-1.30); GFR = 106; UA blood = small; WBC = <1; RBC = 4   04/11/2020 Creatinine = 0.64 low (0.70-1.30); GFR = 105   11/10/2019 PSA = than 50% of this time was spent discussing treatment options, answering questions about treatment and coordinating care.     ASSESSMENT/PLAN:   Microhematuria  (primary encounter diagnosis)  Prostate cancer (hcc)  Prostate induration  Benign prostatic hyper (N42.89) Prostate induration  Please see Prostate Cancer above.      (N40.1,  R35.0) Benign prostatic hyperplasia with urinary frequency  On DOLLY today, prostate 1+ enlarged, R>L. He is not taking any medication for this.  Patient feels this problem is s tamsulosin 0.4 mg twice daily    2. Bladder ultrasound--to investigate worsening in urination problems; we will notify you of the results    3.    CYSTOSCOPY WITH POSSIBLE BLADDER BIOPSY in the office--local      purpose to investigate the microscopic bloo

## 2020-09-16 NOTE — PROGRESS NOTES
Patient came in today to get a vaccine, name and  of patient was verified and it was given on his R deltoid without complications.

## 2020-09-24 ENCOUNTER — TELEPHONE (OUTPATIENT)
Dept: SURGERY | Facility: CLINIC | Age: 65
End: 2020-09-24

## 2020-10-09 ENCOUNTER — HOSPITAL ENCOUNTER (OUTPATIENT)
Dept: ULTRASOUND IMAGING | Age: 65
Discharge: HOME | End: 2020-10-09
Attending: UROLOGY
Payer: COMMERCIAL

## 2020-10-09 DIAGNOSIS — R35.1 NOCTURIA: ICD-10-CM

## 2020-10-09 DIAGNOSIS — R31.29 MICROHEMATURIA: ICD-10-CM

## 2020-10-09 PROCEDURE — 76857 US EXAM PELVIC LIMITED: CPT | Performed by: UROLOGY

## 2020-10-09 NOTE — TELEPHONE ENCOUNTER
I called language line, Botswanan interpretor Cintia Davies, [address] I was calling pt back to ric his procedure date that we have scheduled with Dr. Teresa Martin 10/30/2020 pt needs to call back if he needs to reschedule.

## 2020-10-19 NOTE — TELEPHONE ENCOUNTER
Cysto rescheduled.      Future Appointments   Date Time Provider Alem Buckley   10/21/2020  3:00 PM [facility] LAB2 [facility] CHEMO EMO   10/21/2020  3:30 PM Alyson Zamarripa MD [facility] HEM ONC EMO   10/30/2020  2:40 PM Marva Ruelas MD [facility]   2/1/2021

## 2020-10-21 ENCOUNTER — OFFICE VISIT (OUTPATIENT)
Dept: HEMATOLOGY/ONCOLOGY | Facility: HOSPITAL | Age: 65
End: 2020-10-21
Attending: EMERGENCY MEDICINE
Payer: COMMERCIAL

## 2020-10-21 VITALS
HEIGHT: 70 IN | TEMPERATURE: 97 F | HEART RATE: 66 BPM | SYSTOLIC BLOOD PRESSURE: 126 MMHG | BODY MASS INDEX: 26.77 KG/M2 | RESPIRATION RATE: 18 BRPM | WEIGHT: 187 LBS | DIASTOLIC BLOOD PRESSURE: 76 MMHG | OXYGEN SATURATION: 96 %

## 2020-10-21 DIAGNOSIS — Z08 ENCOUNTER FOR FOLLOW-UP SURVEILLANCE OF DIFFUSE LARGE B-CELL LYMPHOMA: ICD-10-CM

## 2020-10-21 DIAGNOSIS — Z85.72 HISTORY OF DIFFUSE LARGE B-CELL LYMPHOMA: Primary | ICD-10-CM

## 2020-10-21 DIAGNOSIS — Z85.79 ENCOUNTER FOR FOLLOW-UP SURVEILLANCE OF DIFFUSE LARGE B-CELL LYMPHOMA: ICD-10-CM

## 2020-10-21 DIAGNOSIS — C83.39 DIFFUSE LARGE B-CELL LYMPHOMA OF SOLID ORGAN EXCLUDING SPLEEN (HCC): ICD-10-CM

## 2020-10-21 PROCEDURE — 99213 OFFICE O/P EST LOW 20 MIN: CPT | Performed by: INTERNAL MEDICINE

## 2020-10-21 PROCEDURE — 83615 LACTATE (LD) (LDH) ENZYME: CPT

## 2020-10-21 PROCEDURE — 36415 COLL VENOUS BLD VENIPUNCTURE: CPT

## 2020-10-21 PROCEDURE — 85025 COMPLETE CBC W/AUTO DIFF WBC: CPT

## 2020-10-21 PROCEDURE — 80053 COMPREHEN METABOLIC PANEL: CPT

## 2020-10-21 NOTE — PROGRESS NOTES
MIK Rudolphjv Cordova is a 63year old male here for follow up of History of diffuse large b-cell lymphoma  (primary encounter diagnosis)  Encounter for follow-up surveillance of diffuse large b-cell lymphoma     S/p Cycle 6 RCHOP/ RT completed:   Ankita Turpin Tadalafil (CIALIS) 20 MG Oral Tab Take 1 tablet (20 mg total) by mouth daily as needed for Erectile Dysfunction.  10 tablet 2     Allergies:   No Known Allergies    Past Medical History:   Diagnosis Date   • Anesthesia complication     headache post lumbar (190 lb)  10/18/19 : 84.8 kg (187 lb)    Physical Exam   General: Patient is alert and oriented x 3, not in acute distress. HEENT: EOMs intact. PERRL. Oropharynx is clear. Neck: No JVD. No palpable lymphadenopathy. Neck is supple.   Chest: Clear to auscu Value Ref Range    WBC 6.7 4.0 - 11.0 x10(3) uL    RBC 4.04 (L) 4.30 - 5.70 x10(6)uL    HGB 13.4 13.0 - 17.5 g/dL    HCT 37.7 (L) 39.0 - 53.0 %    MCV 93.3 80.0 - 100.0 fL    MCH 33.2 26.0 - 34.0 pg    MCHC 35.5 31.0 - 37.0 g/dL    RDW-SD 42.0 35.1 - 46.3

## 2020-10-29 ENCOUNTER — TELEPHONE (OUTPATIENT)
Dept: SURGERY | Facility: CLINIC | Age: 65
End: 2020-10-29

## 2020-10-29 NOTE — TELEPHONE ENCOUNTER
Called patient no answer left message informing patient that cystoscopy for tomorrow needs to be rescheduled and he should call when he receives message to reschedule appointment

## 2021-01-25 DIAGNOSIS — I10 ESSENTIAL HYPERTENSION: ICD-10-CM

## 2021-01-25 DIAGNOSIS — IMO0002 UNCONTROLLED TYPE 2 DIABETES MELLITUS, WITHOUT LONG-TERM CURRENT USE OF INSULIN: ICD-10-CM

## 2021-01-25 DIAGNOSIS — R39.11 URINARY HESITANCY: ICD-10-CM

## 2021-01-26 RX ORDER — TAMSULOSIN HYDROCHLORIDE 0.4 MG/1
CAPSULE ORAL
Qty: 180 CAPSULE | Refills: 1 | OUTPATIENT
Start: 2021-01-26

## 2021-01-26 RX ORDER — LOSARTAN POTASSIUM AND HYDROCHLOROTHIAZIDE 12.5; 5 MG/1; MG/1
1 TABLET ORAL DAILY
Qty: 90 TABLET | Refills: 1 | OUTPATIENT
Start: 2021-01-26

## 2021-02-01 ENCOUNTER — OFFICE VISIT (OUTPATIENT)
Dept: FAMILY MEDICINE CLINIC | Facility: CLINIC | Age: 66
End: 2021-02-01

## 2021-02-01 VITALS
HEIGHT: 70.5 IN | SYSTOLIC BLOOD PRESSURE: 136 MMHG | HEART RATE: 58 BPM | DIASTOLIC BLOOD PRESSURE: 80 MMHG | BODY MASS INDEX: 27.04 KG/M2 | OXYGEN SATURATION: 98 % | WEIGHT: 191 LBS

## 2021-02-01 DIAGNOSIS — Z11.59 NEED FOR HEPATITIS C SCREENING TEST: ICD-10-CM

## 2021-02-01 DIAGNOSIS — I10 ESSENTIAL HYPERTENSION: ICD-10-CM

## 2021-02-01 DIAGNOSIS — R39.11 URINARY HESITANCY: ICD-10-CM

## 2021-02-01 DIAGNOSIS — R97.20 ELEVATED PSA MEASUREMENT: ICD-10-CM

## 2021-02-01 DIAGNOSIS — Z00.00 ANNUAL PHYSICAL EXAM: Primary | ICD-10-CM

## 2021-02-01 DIAGNOSIS — Z85.72 HX OF LYMPHOMA: ICD-10-CM

## 2021-02-01 DIAGNOSIS — E11.65 UNCONTROLLED TYPE 2 DIABETES MELLITUS, WITHOUT LONG-TERM CURRENT USE OF INSULIN (HCC): ICD-10-CM

## 2021-02-01 DIAGNOSIS — R42 DIZZINESS: ICD-10-CM

## 2021-02-01 LAB
ALBUMIN SERPL-MCNC: 4.4 G/DL (ref 3.4–5)
ALBUMIN/GLOB SERPL: 1.2 {RATIO} (ref 1–2)
ALP LIVER SERPL-CCNC: 72 U/L
ALT SERPL-CCNC: 46 U/L
ANION GAP SERPL CALC-SCNC: 5 MMOL/L (ref 0–18)
AST SERPL-CCNC: 22 U/L (ref 15–37)
BASOPHILS # BLD AUTO: 0.04 X10(3) UL (ref 0–0.2)
BASOPHILS NFR BLD AUTO: 0.6 %
BILIRUB SERPL-MCNC: 0.5 MG/DL (ref 0.1–2)
BUN BLD-MCNC: 22 MG/DL (ref 7–18)
BUN/CREAT SERPL: 25.9 (ref 10–20)
CALCIUM BLD-MCNC: 9.6 MG/DL (ref 8.5–10.1)
CHLORIDE SERPL-SCNC: 103 MMOL/L (ref 98–112)
CHOLEST SMN-MCNC: 160 MG/DL (ref ?–200)
CO2 SERPL-SCNC: 29 MMOL/L (ref 21–32)
CREAT BLD-MCNC: 0.85 MG/DL
DEPRECATED RDW RBC AUTO: 44.4 FL (ref 35.1–46.3)
EOSINOPHIL # BLD AUTO: 0.14 X10(3) UL (ref 0–0.7)
EOSINOPHIL NFR BLD AUTO: 2.1 %
ERYTHROCYTE [DISTWIDTH] IN BLOOD BY AUTOMATED COUNT: 12.6 % (ref 11–15)
EST. AVERAGE GLUCOSE BLD GHB EST-MCNC: 186 MG/DL (ref 68–126)
GLOBULIN PLAS-MCNC: 3.8 G/DL (ref 2.8–4.4)
GLUCOSE BLD-MCNC: 115 MG/DL (ref 70–99)
HBA1C MFR BLD HPLC: 8.1 % (ref ?–5.7)
HCT VFR BLD AUTO: 39 %
HCV AB SERPL QL IA: NONREACTIVE
HDLC SERPL-MCNC: 42 MG/DL (ref 40–59)
HGB BLD-MCNC: 13.3 G/DL
IMM GRANULOCYTES # BLD AUTO: 0.01 X10(3) UL (ref 0–1)
IMM GRANULOCYTES NFR BLD: 0.1 %
LDLC SERPL CALC-MCNC: 87 MG/DL (ref ?–100)
LYMPHOCYTES # BLD AUTO: 3.52 X10(3) UL (ref 1–4)
LYMPHOCYTES NFR BLD AUTO: 52.8 %
M PROTEIN MFR SERPL ELPH: 8.2 G/DL (ref 6.4–8.2)
MCH RBC QN AUTO: 32.7 PG (ref 26–34)
MCHC RBC AUTO-ENTMCNC: 34.1 G/DL (ref 31–37)
MCV RBC AUTO: 95.8 FL
MONOCYTES # BLD AUTO: 0.35 X10(3) UL (ref 0.1–1)
MONOCYTES NFR BLD AUTO: 5.2 %
NEUTROPHILS # BLD AUTO: 2.61 X10 (3) UL (ref 1.5–7.7)
NEUTROPHILS # BLD AUTO: 2.61 X10(3) UL (ref 1.5–7.7)
NEUTROPHILS NFR BLD AUTO: 39.2 %
NONHDLC SERPL-MCNC: 118 MG/DL (ref ?–130)
OSMOLALITY SERPL CALC.SUM OF ELEC: 288 MOSM/KG (ref 275–295)
PATIENT FASTING Y/N/NP: NO
PATIENT FASTING Y/N/NP: NO
PLATELET # BLD AUTO: 208 10(3)UL (ref 150–450)
POTASSIUM SERPL-SCNC: 3.5 MMOL/L (ref 3.5–5.1)
RBC # BLD AUTO: 4.07 X10(6)UL
SODIUM SERPL-SCNC: 137 MMOL/L (ref 136–145)
TRIGL SERPL-MCNC: 153 MG/DL (ref 30–149)
VLDLC SERPL CALC-MCNC: 31 MG/DL (ref 0–30)
WBC # BLD AUTO: 6.7 X10(3) UL (ref 4–11)

## 2021-02-01 PROCEDURE — 80061 LIPID PANEL: CPT | Performed by: INTERNAL MEDICINE

## 2021-02-01 PROCEDURE — 80053 COMPREHEN METABOLIC PANEL: CPT | Performed by: INTERNAL MEDICINE

## 2021-02-01 PROCEDURE — 36415 COLL VENOUS BLD VENIPUNCTURE: CPT | Performed by: INTERNAL MEDICINE

## 2021-02-01 PROCEDURE — 83036 HEMOGLOBIN GLYCOSYLATED A1C: CPT | Performed by: INTERNAL MEDICINE

## 2021-02-01 PROCEDURE — 99396 PREV VISIT EST AGE 40-64: CPT | Performed by: INTERNAL MEDICINE

## 2021-02-01 PROCEDURE — G0438 PPPS, INITIAL VISIT: HCPCS | Performed by: INTERNAL MEDICINE

## 2021-02-01 PROCEDURE — 3079F DIAST BP 80-89 MM HG: CPT | Performed by: INTERNAL MEDICINE

## 2021-02-01 PROCEDURE — 3008F BODY MASS INDEX DOCD: CPT | Performed by: INTERNAL MEDICINE

## 2021-02-01 PROCEDURE — 99213 OFFICE O/P EST LOW 20 MIN: CPT | Performed by: INTERNAL MEDICINE

## 2021-02-01 PROCEDURE — 85025 COMPLETE CBC W/AUTO DIFF WBC: CPT | Performed by: INTERNAL MEDICINE

## 2021-02-01 PROCEDURE — 3075F SYST BP GE 130 - 139MM HG: CPT | Performed by: INTERNAL MEDICINE

## 2021-02-01 PROCEDURE — 86803 HEPATITIS C AB TEST: CPT | Performed by: INTERNAL MEDICINE

## 2021-02-01 RX ORDER — LOSARTAN POTASSIUM AND HYDROCHLOROTHIAZIDE 12.5; 5 MG/1; MG/1
1 TABLET ORAL DAILY
Qty: 90 TABLET | Refills: 1 | Status: SHIPPED | OUTPATIENT
Start: 2021-02-01 | End: 2021-05-26

## 2021-02-01 RX ORDER — TAMSULOSIN HYDROCHLORIDE 0.4 MG/1
0.8 CAPSULE ORAL DAILY
Qty: 180 CAPSULE | Refills: 1 | Status: SHIPPED | OUTPATIENT
Start: 2021-02-01 | End: 2021-05-26

## 2021-02-01 NOTE — PROGRESS NOTES
[facility]  Return Patient Progress Note      HPI:   Patient presents with:  Hypertension  Dizziness      Rickey Alexandra is a 63year old male presenting for: medication refills.       Has a significant  has a past medical history 5.70 x10(6)uL    HGB 13.4 13.0 - 17.5 g/dL    HCT 37.7 (L) 39.0 - 53.0 %    MCV 93.3 80.0 - 100.0 fL    MCH 33.2 26.0 - 34.0 pg    MCHC 35.5 31.0 - 37.0 g/dL    RDW-SD 42.0 35.1 - 46.3 fL    RDW 12.3 11.0 - 15.0 %    .0 150.0 - 450.0 10(3)uL    Neut • Anesthesia complication     headache post lumbar puncture   • Diabetes ([facility]    • Diabetes mellitus ([facility] 2014   • Diffuse large B-cell lymphoma of solid organ excluding spleen ([facility] 10/25/2016   • Dry eye syndrome 10/14/2019   • ESBL (extended spectru stool and abdominal distention. Endocrine: Negative for cold intolerance, heat intolerance and polyuria. Genitourinary: Negative for dysuria, urgency and hematuria.    Musculoskeletal: Negative for myalgias, back pain, joint swelling, joint pain, gait p rales. He exhibits no tenderness. Abdominal: Soft. Bowel sounds are normal. He exhibits no distension and no mass. There is no hepatosplenomegaly. There is no abdominal tenderness. There is no rebound and no guarding.    Genitourinary:    Prostate and rec 10/17/2020  Influenza Vaccine(1) due on 09/01/2020  Diabetes Care A1C due on 01/24/2021  LDL Control due on 07/24/2021  PSA due on 04/21/2022  Colonoscopy due on 04/20/2024      Meds & Refills for this Visit:  Requested Prescriptions      No prescriptions

## 2021-02-02 ENCOUNTER — PATIENT MESSAGE (OUTPATIENT)
Dept: FAMILY MEDICINE CLINIC | Facility: CLINIC | Age: 66
End: 2021-02-02

## 2021-02-03 ENCOUNTER — TELEPHONE (OUTPATIENT)
Dept: FAMILY MEDICINE CLINIC | Facility: CLINIC | Age: 66
End: 2021-02-03

## 2021-02-03 DIAGNOSIS — E11.65 UNCONTROLLED TYPE 2 DIABETES MELLITUS, WITHOUT LONG-TERM CURRENT USE OF INSULIN (HCC): Primary | ICD-10-CM

## 2021-02-03 RX ORDER — ATORVASTATIN CALCIUM 10 MG/1
40 TABLET, FILM COATED ORAL DAILY
Qty: 90 TABLET | Refills: 0 | Status: SHIPPED | OUTPATIENT
Start: 2021-02-03 | End: 2021-05-26

## 2021-02-04 RX ORDER — ATORVASTATIN CALCIUM 40 MG/1
40 TABLET, FILM COATED ORAL DAILY
Qty: 90 TABLET | Refills: 1 | OUTPATIENT
Start: 2021-02-04

## 2021-02-04 RX ORDER — ATORVASTATIN CALCIUM 40 MG/1
40 TABLET, FILM COATED ORAL NIGHTLY
Qty: 90 TABLET | Refills: 1 | Status: SHIPPED | OUTPATIENT
Start: 2021-02-04 | End: 2021-05-26

## 2021-02-04 NOTE — TELEPHONE ENCOUNTER
Called and informed of results. His A1C increased. Will start on januvia 100 mg and Lipitor 40 based on results. He expressed understanding. Follow up in 3 months. Repeat lab orders placed.        Alberto Pantoja MD

## 2021-02-11 NOTE — TELEPHONE ENCOUNTER
Conversation  (Newest Message First)  Job Guajardo MD  2/3/21 6:16 PM  Note     Called and informed of results. His A1C increased. Will start on januvia 100 mg and Lipitor 40 based on results. He expressed understanding. Follow up in 3 months.

## 2021-03-12 DIAGNOSIS — Z23 NEED FOR VACCINATION: ICD-10-CM

## 2021-04-14 NOTE — PROGRESS NOTES
Skin Plan Of Care:    Skin Problem:  Potential for impaired skin integrity    Problems related to:    Side effects of radiation therapy    Interventions:  Assess skin Instruct patient on skin care Keep area clean and dry Encourage fluids/diet    Expected O [General Appearance - Well Developed] : well developed [Normal Appearance] : normal appearance [Well Groomed] : well groomed [General Appearance - Well Nourished] : well nourished [No Deformities] : no deformities [General Appearance - In No Acute Distress] : no acute distress [Normal Conjunctiva] : the conjunctiva exhibited no abnormalities [Eyelids - No Xanthelasma] : the eyelids demonstrated no xanthelasmas [Normal Oral Mucosa] : normal oral mucosa [No Oral Pallor] : no oral pallor [No Oral Cyanosis] : no oral cyanosis [Normal Jugular Venous A Waves Present] : normal jugular venous A waves present [Normal Jugular Venous V Waves Present] : normal jugular venous V waves present [No Jugular Venous Parry A Waves] : no jugular venous parry A waves [Respiration, Rhythm And Depth] : normal respiratory rhythm and effort [Exaggerated Use Of Accessory Muscles For Inspiration] : no accessory muscle use [Auscultation Breath Sounds / Voice Sounds] : lungs were clear to auscultation bilaterally [Abdomen Soft] : soft [Abdomen Tenderness] : non-tender [Abdomen Mass (___ Cm)] : no abdominal mass palpated [Abnormal Walk] : normal gait [Gait - Sufficient For Exercise Testing] : the gait was sufficient for exercise testing [Nail Clubbing] : no clubbing of the fingernails [Cyanosis, Localized] : no localized cyanosis [Petechial Hemorrhages (___cm)] : no petechial hemorrhages [Skin Color & Pigmentation] : normal skin color and pigmentation [] : no rash [No Venous Stasis] : no venous stasis [Skin Lesions] : no skin lesions [No Skin Ulcers] : no skin ulcer [No Xanthoma] : no  xanthoma was observed [Oriented To Time, Place, And Person] : oriented to person, place, and time [Affect] : the affect was normal [Mood] : the mood was normal [No Anxiety] : not feeling anxious [FreeTextEntry1] : Regular rate and rhythm, NL S1, S2, non-displaced PMI, chest non-tender; no rubs,heaves  or gallops a  Grade 2/6 systolic murmur noted at the LSB

## 2021-04-20 ENCOUNTER — NURSE ONLY (OUTPATIENT)
Dept: HEMATOLOGY/ONCOLOGY | Facility: HOSPITAL | Age: 66
End: 2021-04-20
Attending: EMERGENCY MEDICINE
Payer: COMMERCIAL

## 2021-04-20 VITALS
DIASTOLIC BLOOD PRESSURE: 74 MMHG | RESPIRATION RATE: 18 BRPM | TEMPERATURE: 97 F | WEIGHT: 187 LBS | SYSTOLIC BLOOD PRESSURE: 127 MMHG | HEART RATE: 59 BPM | BODY MASS INDEX: 26.77 KG/M2 | OXYGEN SATURATION: 98 % | HEIGHT: 70 IN

## 2021-04-20 DIAGNOSIS — Z85.79 ENCOUNTER FOR FOLLOW-UP SURVEILLANCE OF DIFFUSE LARGE B-CELL LYMPHOMA: ICD-10-CM

## 2021-04-20 DIAGNOSIS — Z85.72 HISTORY OF DIFFUSE LARGE B-CELL LYMPHOMA: Primary | ICD-10-CM

## 2021-04-20 DIAGNOSIS — C83.39 DIFFUSE LARGE B-CELL LYMPHOMA OF SOLID ORGAN EXCLUDING SPLEEN (HCC): ICD-10-CM

## 2021-04-20 DIAGNOSIS — Z08 ENCOUNTER FOR FOLLOW-UP SURVEILLANCE OF DIFFUSE LARGE B-CELL LYMPHOMA: ICD-10-CM

## 2021-04-20 PROCEDURE — 85025 COMPLETE CBC W/AUTO DIFF WBC: CPT

## 2021-04-20 PROCEDURE — 83615 LACTATE (LD) (LDH) ENZYME: CPT

## 2021-04-20 PROCEDURE — 36415 COLL VENOUS BLD VENIPUNCTURE: CPT

## 2021-04-20 PROCEDURE — 80053 COMPREHEN METABOLIC PANEL: CPT

## 2021-04-20 PROCEDURE — 99213 OFFICE O/P EST LOW 20 MIN: CPT | Performed by: INTERNAL MEDICINE

## 2021-04-20 NOTE — PROGRESS NOTES
HPI     Krystle Valero is a 63year old male here for follow up of History of diffuse large b-cell lymphoma  (primary encounter diagnosis)  Encounter for follow-up surveillance of diffuse large b-cell lymphoma     S/p Cycle 6 RCHOP/ RT completed:   Florinda Parra daily. 90 tablet 1   • tamsulosin (FLOMAX) cap Take 2 capsules (0.8 mg total) by mouth daily. 180 capsule 1   • metFORMIN HCl 1000 MG Oral Tab Take 1 tablet (1,000 mg total) by mouth 2 (two) times daily with meals.  180 tablet 1     Allergies:   No Known Al (191 lb)  10/21/20 : 84.8 kg (187 lb)  09/15/20 : 83.5 kg (184 lb)  07/29/20 : 83.5 kg (184 lb)  04/21/20 : 85.3 kg (188 lb)    Physical Exam   General: Patient is alert and oriented x 3, not in acute distress. HEENT: EOMs intact. PERRL.  Oropharynx is molina COMP METABOLIC PANEL (14)    Collection Time: 04/20/21  3:29 PM   Result Value Ref Range    Glucose 96 70 - 99 mg/dL    Sodium 139 136 - 145 mmol/L    Potassium 3.6 3.5 - 5.1 mmol/L    Chloride 107 98 - 112 mmol/L    CO2 28.0 21.0 - 32.0 mmol/L    Anion

## 2021-05-22 ENCOUNTER — LAB ENCOUNTER (OUTPATIENT)
Dept: LAB | Facility: HOSPITAL | Age: 66
End: 2021-05-22
Attending: INTERNAL MEDICINE
Payer: COMMERCIAL

## 2021-05-22 DIAGNOSIS — E11.65 UNCONTROLLED TYPE 2 DIABETES MELLITUS, WITHOUT LONG-TERM CURRENT USE OF INSULIN (HCC): ICD-10-CM

## 2021-05-22 PROCEDURE — 36415 COLL VENOUS BLD VENIPUNCTURE: CPT | Performed by: INTERNAL MEDICINE

## 2021-05-22 PROCEDURE — 83036 HEMOGLOBIN GLYCOSYLATED A1C: CPT | Performed by: INTERNAL MEDICINE

## 2021-05-22 PROCEDURE — 80053 COMPREHEN METABOLIC PANEL: CPT

## 2021-05-22 PROCEDURE — 80061 LIPID PANEL: CPT | Performed by: INTERNAL MEDICINE

## 2021-05-26 ENCOUNTER — OFFICE VISIT (OUTPATIENT)
Dept: FAMILY MEDICINE CLINIC | Facility: CLINIC | Age: 66
End: 2021-05-26

## 2021-05-26 VITALS
BODY MASS INDEX: 27 KG/M2 | WEIGHT: 185 LBS | HEART RATE: 62 BPM | TEMPERATURE: 97 F | DIASTOLIC BLOOD PRESSURE: 70 MMHG | SYSTOLIC BLOOD PRESSURE: 120 MMHG | OXYGEN SATURATION: 99 %

## 2021-05-26 DIAGNOSIS — Z01.00 DIABETIC EYE EXAM (HCC): Primary | ICD-10-CM

## 2021-05-26 DIAGNOSIS — E11.65 UNCONTROLLED TYPE 2 DIABETES MELLITUS, WITHOUT LONG-TERM CURRENT USE OF INSULIN (HCC): ICD-10-CM

## 2021-05-26 DIAGNOSIS — R39.11 URINARY HESITANCY: ICD-10-CM

## 2021-05-26 DIAGNOSIS — E11.9 DIABETIC EYE EXAM (HCC): Primary | ICD-10-CM

## 2021-05-26 DIAGNOSIS — I10 ESSENTIAL HYPERTENSION: ICD-10-CM

## 2021-05-26 PROCEDURE — 99214 OFFICE O/P EST MOD 30 MIN: CPT | Performed by: INTERNAL MEDICINE

## 2021-05-26 PROCEDURE — 3074F SYST BP LT 130 MM HG: CPT | Performed by: INTERNAL MEDICINE

## 2021-05-26 PROCEDURE — 3078F DIAST BP <80 MM HG: CPT | Performed by: INTERNAL MEDICINE

## 2021-05-26 RX ORDER — ATORVASTATIN CALCIUM 80 MG/1
80 TABLET, FILM COATED ORAL NIGHTLY
Qty: 90 TABLET | Refills: 1 | Status: SHIPPED | OUTPATIENT
Start: 2021-05-26 | End: 2021-08-05

## 2021-05-26 RX ORDER — LOSARTAN POTASSIUM AND HYDROCHLOROTHIAZIDE 12.5; 5 MG/1; MG/1
1 TABLET ORAL DAILY
Qty: 90 TABLET | Refills: 1 | Status: SHIPPED | OUTPATIENT
Start: 2021-05-26 | End: 2021-05-26

## 2021-05-26 RX ORDER — TAMSULOSIN HYDROCHLORIDE 0.4 MG/1
0.8 CAPSULE ORAL DAILY
Qty: 180 CAPSULE | Refills: 1 | Status: SHIPPED | OUTPATIENT
Start: 2021-05-26 | End: 2021-08-05

## 2021-05-26 RX ORDER — LOSARTAN POTASSIUM AND HYDROCHLOROTHIAZIDE 12.5; 5 MG/1; MG/1
1 TABLET ORAL DAILY
Qty: 90 TABLET | Refills: 1 | Status: SHIPPED | OUTPATIENT
Start: 2021-05-26 | End: 2021-08-05

## 2021-05-26 RX ORDER — ATORVASTATIN CALCIUM 40 MG/1
40 TABLET, FILM COATED ORAL NIGHTLY
Qty: 90 TABLET | Refills: 1 | Status: SHIPPED | OUTPATIENT
Start: 2021-05-26 | End: 2021-05-26

## 2021-05-28 NOTE — PROGRESS NOTES
[facility]  Return Patient Progress Note      HPI:   Patient presents with:  Diabetes      Venus Marquez is a 63year old male presenting for: medication refills.       Has a significant  has a past medical history of Anesthesia 05/22/2021 08:02 AM    ALKPHO 64 05/22/2021 08:02 AM    AST 19 05/22/2021 08:02 AM    ALT 30 05/22/2021 08:02 AM    BILT 0.7 05/22/2021 08:02 AM        Hemoglobin A1C, Microalbumin  Lab Results   Component Value Date/Time    A1C 7.0 (H) 05/22/2021 08:02 AM REMOVAL TESTIS,SIMPLE         Allergies:  No Known Allergies   Social History:  Social History    Tobacco Use      Smoking status: Former Smoker        Packs/day: 1.00        Years: 35.00        Pack years: 35      Smokeless tobacco: Never Used      Tobacc Pulse 62   Temp 97.4 °F (36.3 °C) (Temporal)   Wt 185 lb (83.9 kg)   SpO2 99%   BMI 26.54 kg/m²  Estimated body mass index is 26.54 kg/m² as calculated from the following:    Height as of 4/20/21: 5' 10\" (1.778 m).     Weight as of this encounter: 185 lb Refilled losartan -HCTZ.      (E11.9) Controlled type 2 diabetes mellitus with complication, without long-term current use of insulin (HCC)  -   A1C improved and decreased to 7.0 On metformin and sitaliptin. On ARB.   Not on statin.      (Z01.00,  E11.9) agrees to the above plan. Reasurrance and education provided. All questions answered. Notified to call with any questions, complications, allergies, or worsening or changing symptoms as well as any side effects or complications from the treatments .   Red

## 2021-07-08 ENCOUNTER — TELEPHONE (OUTPATIENT)
Dept: INTERNAL MEDICINE CLINIC | Facility: CLINIC | Age: 66
End: 2021-07-08

## 2021-07-08 NOTE — TELEPHONE ENCOUNTER
Patient is due for DM eye exam.    Referral approved for Dr Duane Hogue. Language Line  [place] [phone] left msg to call back.

## 2021-07-26 ENCOUNTER — TELEPHONE (OUTPATIENT)
Dept: SURGERY | Facility: CLINIC | Age: 66
End: 2021-07-26

## 2021-07-26 NOTE — TELEPHONE ENCOUNTER
I called pt with the assistance of Indonesian interpretor Janessa Liao [place] # [id number] and we had to [place] regarding his appt for 8/3 which needs to be reschd. As it is not a valid appt.

## 2021-08-02 ENCOUNTER — LAB ENCOUNTER (OUTPATIENT)
Dept: LAB | Facility: HOSPITAL | Age: 66
End: 2021-08-02
Attending: UROLOGY
Payer: COMMERCIAL

## 2021-08-02 ENCOUNTER — OFFICE VISIT (OUTPATIENT)
Dept: SURGERY | Facility: CLINIC | Age: 66
End: 2021-08-02

## 2021-08-02 VITALS
BODY MASS INDEX: 26.48 KG/M2 | HEART RATE: 58 BPM | HEIGHT: 70 IN | DIASTOLIC BLOOD PRESSURE: 73 MMHG | WEIGHT: 185 LBS | SYSTOLIC BLOOD PRESSURE: 125 MMHG | RESPIRATION RATE: 16 BRPM

## 2021-08-02 DIAGNOSIS — N40.1 BENIGN PROSTATIC HYPERPLASIA WITH URINARY FREQUENCY: ICD-10-CM

## 2021-08-02 DIAGNOSIS — Z87.438 HISTORY OF PROSTATITIS: ICD-10-CM

## 2021-08-02 DIAGNOSIS — C61 PROSTATE CANCER (HCC): ICD-10-CM

## 2021-08-02 DIAGNOSIS — R31.29 MICROHEMATURIA: ICD-10-CM

## 2021-08-02 DIAGNOSIS — R35.1 NOCTURIA: ICD-10-CM

## 2021-08-02 DIAGNOSIS — N28.1 BILATERAL RENAL CYSTS: ICD-10-CM

## 2021-08-02 DIAGNOSIS — R35.0 BENIGN PROSTATIC HYPERPLASIA WITH URINARY FREQUENCY: ICD-10-CM

## 2021-08-02 DIAGNOSIS — R31.29 MICROHEMATURIA: Primary | ICD-10-CM

## 2021-08-02 LAB
BILIRUB UR QL: NEGATIVE
CLARITY UR: CLEAR
COLOR UR: YELLOW
GLUCOSE UR-MCNC: NEGATIVE MG/DL
HGB UR QL STRIP.AUTO: NEGATIVE
KETONES UR-MCNC: NEGATIVE MG/DL
LEUKOCYTE ESTERASE UR QL STRIP.AUTO: NEGATIVE
NITRITE UR QL STRIP.AUTO: NEGATIVE
PH UR: 8 [PH] (ref 5–8)
PROT UR-MCNC: NEGATIVE MG/DL
PSA SERPL-MCNC: 3.22 NG/ML (ref ?–4)
SP GR UR STRIP: 1.01 (ref 1–1.03)
UROBILINOGEN UR STRIP-ACNC: <2

## 2021-08-02 PROCEDURE — 84153 ASSAY OF PSA TOTAL: CPT

## 2021-08-02 PROCEDURE — 36415 COLL VENOUS BLD VENIPUNCTURE: CPT

## 2021-08-02 PROCEDURE — 99215 OFFICE O/P EST HI 40 MIN: CPT | Performed by: UROLOGY

## 2021-08-02 PROCEDURE — 81003 URINALYSIS AUTO W/O SCOPE: CPT

## 2021-08-02 PROCEDURE — 3008F BODY MASS INDEX DOCD: CPT | Performed by: UROLOGY

## 2021-08-02 PROCEDURE — 88108 CYTOPATH CONCENTRATE TECH: CPT

## 2021-08-02 PROCEDURE — 3074F SYST BP LT 130 MM HG: CPT | Performed by: UROLOGY

## 2021-08-02 PROCEDURE — 3078F DIAST BP <80 MM HG: CPT | Performed by: UROLOGY

## 2021-08-02 NOTE — PROGRESS NOTES
HPI:    Patient ID: Daly Bensno is a 63year old male. HPI      Prostate Cancer  Chronic.  Diagnosed 2013 by Dr. Alfred Duarte; [place] 3+3 and chosen active surveillance thus far. 01/13/2019 cancer surveillance restaging biopsy shows small amount of Aleja Moraes \" about his urinating problem.       History of ESBL E Coli Prostatitis  S/p 01/14/2019 prostate biopsy. 01/15/2019 patient complained of pain with urination; associated vomiting and abdominal pain.  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 EF 63% and negative stress test.  10/01/2018 Office consult with me;  On DOLLY, prostate 2+ enlarged, R>L, soft; slight 1/4+ induration on the right' prostate cancer nik[place] 3+3 biops 0-1+ enlarged, right lobe slightly larger than the left, slight 1/4+ induration on the right lateral edge; Continue tamsulosin 0.8 mg daily   09/15/2020 Office visit with me;  On DOLLY, prostate 1+ enlarged, 2/4+ right lateral edge induration; Prostate cancer • Portacath in place 11/4/2016   • Prostate cancer ([facility] [address] 07/18/2015    low volume, [place] 3+3, Dx by Dr. Adriana Piña.  on observation by Urology   • Vitreous floaters 10/14/2019      Past Surgical History:   Procedure Laterality Date   • COLONOSCOPY  2008 cores approximately 5% of submitted specimen; Other regions negative for cancer.        LABORATORIES   05/22/2021 Creatinine = 0.65; GFR = 104  02/01/2021 Creatinine = 0.85; GFR = 93  10/21/2020 Creatinine = 0.90; GFR = 91  09/11/2020 PSA = 3.89;  Urine cyt = prostatomegaly with impingement of the bladder base; post void residual volume of 42 mL     04/29/2018 CT Chest Abdomen Pelvis (all contrast) = kidneys normal; bladder decompressed; multilevel degenerative hcanefed of the visualized spine; surgical simmons back abnormal, patient will consider cystoscopy with possible bladder biopsy. (C61) Prostate cancer ([facility] [address]   Chronic. Diagnosed 2013 by Dr. Milton Banks; [place] 3+3.  Patient has chosen active surveillance thus far. 01/13/2019 cancer surveillance restaging biop patient's worsening AUA score compared to last office visit, we discussed bladder US. I fully explained to patient the benefits, risks, and alternatives to this treatment option and I answered patient's questions; patient decides to complete bladder US.     know the results whether normal or abnormal      4.   If the urine test which you are submitting this evening come back abnormal, if they show abnormally elevated microscopic blood in your urine or abnormal urine cytology cells in your urine, then I would r

## 2021-08-02 NOTE — PATIENT INSTRUCTIONS
Sunshine Ge M.D.    1.  Continue tamsulosin 0.4 mg daily    2.   Tonight blood draw for PSA to check on status of your prostate cancer and also submit urine specimen for complete urinalysis and urine for cytol

## 2021-08-03 ENCOUNTER — TELEPHONE (OUTPATIENT)
Dept: CASE MANAGEMENT | Age: 66
End: 2021-08-03

## 2021-08-03 LAB — NON GYNE INTERPRETATION: NEGATIVE

## 2021-08-05 DIAGNOSIS — E11.65 UNCONTROLLED TYPE 2 DIABETES MELLITUS, WITHOUT LONG-TERM CURRENT USE OF INSULIN (HCC): ICD-10-CM

## 2021-08-05 DIAGNOSIS — R39.11 URINARY HESITANCY: ICD-10-CM

## 2021-08-05 DIAGNOSIS — I10 ESSENTIAL HYPERTENSION: ICD-10-CM

## 2021-08-05 RX ORDER — TAMSULOSIN HYDROCHLORIDE 0.4 MG/1
0.8 CAPSULE ORAL DAILY
Qty: 180 CAPSULE | Refills: 1 | Status: SHIPPED | OUTPATIENT
Start: 2021-08-05

## 2021-08-05 RX ORDER — ATORVASTATIN CALCIUM 80 MG/1
80 TABLET, FILM COATED ORAL NIGHTLY
Qty: 90 TABLET | Refills: 1 | Status: SHIPPED | OUTPATIENT
Start: 2021-08-05

## 2021-08-05 RX ORDER — LOSARTAN POTASSIUM AND HYDROCHLOROTHIAZIDE 12.5; 5 MG/1; MG/1
1 TABLET ORAL DAILY
Qty: 90 TABLET | Refills: 1 | Status: SHIPPED | OUTPATIENT
Start: 2021-08-05

## 2021-08-25 ENCOUNTER — HOSPITAL ENCOUNTER (OUTPATIENT)
Dept: MRI IMAGING | Facility: HOSPITAL | Age: 66
Discharge: HOME | End: 2021-08-25
Attending: UROLOGY
Payer: COMMERCIAL

## 2021-08-25 DIAGNOSIS — C61 PROSTATE CANCER (HCC): ICD-10-CM

## 2021-08-25 LAB — CREAT BLD-MCNC: 0.7 MG/DL

## 2021-08-25 PROCEDURE — 72197 MRI PELVIS W/O & W/DYE: CPT | Performed by: UROLOGY

## 2021-08-25 PROCEDURE — 82565 ASSAY OF CREATININE: CPT

## 2021-08-25 PROCEDURE — A9575 INJ GADOTERATE MEGLUMI 0.1ML: HCPCS | Performed by: UROLOGY

## 2021-08-30 NOTE — PROGRESS NOTES
I sent patient the following message by means of \"my chart\" =    Sheng Mishra,  8/28/2021 MRI of prostate shows that prostate is 42 mL in volume or approximately 2 times larger than normal.  There were no lesions suspicious for prostate cancer; radiologist gerald

## 2021-10-19 ENCOUNTER — NURSE ONLY (OUTPATIENT)
Dept: HEMATOLOGY/ONCOLOGY | Facility: HOSPITAL | Age: 66
End: 2021-10-19
Attending: EMERGENCY MEDICINE
Payer: COMMERCIAL

## 2021-10-19 ENCOUNTER — OFFICE VISIT (OUTPATIENT)
Dept: HEMATOLOGY/ONCOLOGY | Facility: HOSPITAL | Age: 66
End: 2021-10-19
Attending: INTERNAL MEDICINE
Payer: COMMERCIAL

## 2021-10-19 VITALS
HEART RATE: 60 BPM | SYSTOLIC BLOOD PRESSURE: 117 MMHG | BODY MASS INDEX: 26.2 KG/M2 | RESPIRATION RATE: 18 BRPM | OXYGEN SATURATION: 97 % | DIASTOLIC BLOOD PRESSURE: 64 MMHG | TEMPERATURE: 99 F | WEIGHT: 183 LBS | HEIGHT: 70 IN

## 2021-10-19 DIAGNOSIS — Z85.79 ENCOUNTER FOR FOLLOW-UP SURVEILLANCE OF DIFFUSE LARGE B-CELL LYMPHOMA: ICD-10-CM

## 2021-10-19 DIAGNOSIS — Z08 ENCOUNTER FOR FOLLOW-UP SURVEILLANCE OF DIFFUSE LARGE B-CELL LYMPHOMA: ICD-10-CM

## 2021-10-19 DIAGNOSIS — Z85.72 HISTORY OF DIFFUSE LARGE B-CELL LYMPHOMA: Primary | ICD-10-CM

## 2021-10-19 DIAGNOSIS — C83.39 DIFFUSE LARGE B-CELL LYMPHOMA OF SOLID ORGAN EXCLUDING SPLEEN (HCC): ICD-10-CM

## 2021-10-19 PROCEDURE — 85025 COMPLETE CBC W/AUTO DIFF WBC: CPT

## 2021-10-19 PROCEDURE — 80053 COMPREHEN METABOLIC PANEL: CPT

## 2021-10-19 PROCEDURE — 99213 OFFICE O/P EST LOW 20 MIN: CPT | Performed by: INTERNAL MEDICINE

## 2021-10-19 PROCEDURE — 36415 COLL VENOUS BLD VENIPUNCTURE: CPT

## 2021-10-19 PROCEDURE — 83615 LACTATE (LD) (LDH) ENZYME: CPT

## 2021-10-19 NOTE — PROGRESS NOTES
HPI     Carmel Ferraro is a 61year old male here for follow up of History of diffuse large b-cell lymphoma  (primary encounter diagnosis)  Encounter for follow-up surveillance of diffuse large b-cell lymphoma     S/p Cycle 6 RCHOP/ RT completed:   Hari Jackson (0.8 mg total) by mouth daily.  180 capsule 1     Allergies:   No Known Allergies    Past Medical History:   Diagnosis Date   • Anesthesia complication     headache post lumbar puncture   • Diabetes ([facility] [address]    • Diabetes mellitus ([facility] [address] 2014   • Diffuse large not in acute distress. HEENT: EOMs intact. PERRL. Oropharynx is clear. Neck: No JVD. No palpable lymphadenopathy. Neck is supple. Chest: Clear to auscultation. Heart: Regular rate and rhythm. Abdomen: Soft, non tender with good bowel sounds.   Extrem Glucose 104 (H) 70 - 99 mg/dL    Sodium 143 136 - 145 mmol/L    Potassium 4.0 3.5 - 5.1 mmol/L    Chloride 105 98 - 112 mmol/L    CO2 30.0 21.0 - 32.0 mmol/L    Anion Gap 8 0 - 18 mmol/L    BUN 18 7 - 18 mg/dL    Creatinine 0.64 (L) 0.70 - 1.30 mg/dL    BU

## 2021-11-01 ENCOUNTER — TELEPHONE (OUTPATIENT)
Dept: FAMILY MEDICINE CLINIC | Facility: CLINIC | Age: 66
End: 2021-11-01

## 2021-11-03 RX ORDER — LANCETS 33 GAUGE
1 EACH MISCELLANEOUS 2 TIMES DAILY
Qty: 100 EACH | Refills: 3 | Status: SHIPPED | OUTPATIENT
Start: 2021-11-03

## 2021-11-03 RX ORDER — BLOOD-GLUCOSE METER
1 KIT MISCELLANEOUS 2 TIMES DAILY
Qty: 1 KIT | Refills: 0 | Status: SHIPPED | OUTPATIENT
Start: 2021-11-03

## 2021-11-03 RX ORDER — BLOOD SUGAR DIAGNOSTIC
STRIP MISCELLANEOUS
Qty: 100 STRIP | Refills: 3 | Status: SHIPPED | OUTPATIENT
Start: 2021-11-03

## 2021-11-09 ENCOUNTER — NURSE ONLY (OUTPATIENT)
Dept: FAMILY MEDICINE CLINIC | Facility: CLINIC | Age: 66
End: 2021-11-09

## 2021-11-09 DIAGNOSIS — Z23 NEED FOR INFLUENZA VACCINATION: Primary | ICD-10-CM

## 2021-11-09 PROCEDURE — 90686 IIV4 VACC NO PRSV 0.5 ML IM: CPT | Performed by: INTERNAL MEDICINE

## 2021-11-09 PROCEDURE — 90471 IMMUNIZATION ADMIN: CPT | Performed by: INTERNAL MEDICINE

## 2021-11-10 PROBLEM — H04.129 DRY EYE: Status: ACTIVE | Noted: 2021-11-10

## 2022-02-22 ENCOUNTER — TELEPHONE (OUTPATIENT)
Dept: FAMILY MEDICINE CLINIC | Facility: CLINIC | Age: 67
End: 2022-02-22

## 2022-02-22 NOTE — TELEPHONE ENCOUNTER
Pt is calling requesting blood work order. pt has appointment with doctor on march 17.       Please call and advise

## 2022-03-12 ENCOUNTER — LAB ENCOUNTER (OUTPATIENT)
Dept: LAB | Facility: HOSPITAL | Age: 67
End: 2022-03-12
Attending: INTERNAL MEDICINE
Payer: COMMERCIAL

## 2022-03-12 DIAGNOSIS — IMO0002 UNCONTROLLED TYPE 2 DIABETES MELLITUS, WITHOUT LONG-TERM CURRENT USE OF INSULIN: ICD-10-CM

## 2022-03-12 DIAGNOSIS — E78.1 PURE HYPERGLYCERIDEMIA: ICD-10-CM

## 2022-03-12 DIAGNOSIS — I10 ESSENTIAL HYPERTENSION: ICD-10-CM

## 2022-03-12 LAB
ALBUMIN SERPL-MCNC: 4.2 G/DL (ref 3.4–5)
ALBUMIN/GLOB SERPL: 1.2 {RATIO} (ref 1–2)
ALP LIVER SERPL-CCNC: 76 U/L
ALT SERPL-CCNC: 37 U/L
ANION GAP SERPL CALC-SCNC: 7 MMOL/L (ref 0–18)
AST SERPL-CCNC: 20 U/L (ref 15–37)
BILIRUB SERPL-MCNC: 0.5 MG/DL (ref 0.1–2)
BUN BLD-MCNC: 18 MG/DL (ref 7–18)
BUN/CREAT SERPL: 27.7 (ref 10–20)
CALCIUM BLD-MCNC: 8.9 MG/DL (ref 8.5–10.1)
CHLORIDE SERPL-SCNC: 105 MMOL/L (ref 98–112)
CHOLEST SERPL-MCNC: 145 MG/DL (ref ?–200)
CO2 SERPL-SCNC: 27 MMOL/L (ref 21–32)
CREAT BLD-MCNC: 0.65 MG/DL
EST. AVERAGE GLUCOSE BLD GHB EST-MCNC: 140 MG/DL (ref 68–126)
FASTING PATIENT LIPID ANSWER: YES
FASTING STATUS PATIENT QL REPORTED: YES
GLOBULIN PLAS-MCNC: 3.5 G/DL (ref 2.8–4.4)
GLUCOSE BLD-MCNC: 115 MG/DL (ref 70–99)
HBA1C MFR BLD: 6.5 % (ref ?–5.7)
HDLC SERPL-MCNC: 45 MG/DL (ref 40–59)
LDLC SERPL CALC-MCNC: 82 MG/DL (ref ?–100)
NONHDLC SERPL-MCNC: 100 MG/DL (ref ?–130)
OSMOLALITY SERPL CALC.SUM OF ELEC: 291 MOSM/KG (ref 275–295)
POTASSIUM SERPL-SCNC: 4.2 MMOL/L (ref 3.5–5.1)
PROT SERPL-MCNC: 7.7 G/DL (ref 6.4–8.2)
SODIUM SERPL-SCNC: 139 MMOL/L (ref 136–145)
TRIGL SERPL-MCNC: 93 MG/DL (ref 30–149)
VLDLC SERPL CALC-MCNC: 15 MG/DL (ref 0–30)

## 2022-03-12 PROCEDURE — 3044F HG A1C LEVEL LT 7.0%: CPT | Performed by: INTERNAL MEDICINE

## 2022-03-12 PROCEDURE — 83036 HEMOGLOBIN GLYCOSYLATED A1C: CPT

## 2022-03-12 PROCEDURE — 80053 COMPREHEN METABOLIC PANEL: CPT

## 2022-03-12 PROCEDURE — 36415 COLL VENOUS BLD VENIPUNCTURE: CPT

## 2022-03-12 PROCEDURE — 80061 LIPID PANEL: CPT

## 2022-03-17 ENCOUNTER — OFFICE VISIT (OUTPATIENT)
Dept: FAMILY MEDICINE CLINIC | Facility: CLINIC | Age: 67
End: 2022-03-17
Payer: COMMERCIAL

## 2022-03-17 VITALS
WEIGHT: 178 LBS | DIASTOLIC BLOOD PRESSURE: 70 MMHG | OXYGEN SATURATION: 100 % | BODY MASS INDEX: 25.48 KG/M2 | TEMPERATURE: 97 F | HEIGHT: 70 IN | SYSTOLIC BLOOD PRESSURE: 130 MMHG | HEART RATE: 56 BPM

## 2022-03-17 DIAGNOSIS — Z00.00 ANNUAL PHYSICAL EXAM: Primary | ICD-10-CM

## 2022-03-17 DIAGNOSIS — R39.11 URINARY HESITANCY: ICD-10-CM

## 2022-03-17 DIAGNOSIS — I10 ESSENTIAL HYPERTENSION: ICD-10-CM

## 2022-03-17 DIAGNOSIS — IMO0002 UNCONTROLLED TYPE 2 DIABETES MELLITUS, WITHOUT LONG-TERM CURRENT USE OF INSULIN: ICD-10-CM

## 2022-03-17 DIAGNOSIS — E11.9 DIABETIC EYE EXAM (HCC): ICD-10-CM

## 2022-03-17 DIAGNOSIS — Z01.00 DIABETIC EYE EXAM (HCC): ICD-10-CM

## 2022-03-17 PROCEDURE — 3078F DIAST BP <80 MM HG: CPT | Performed by: INTERNAL MEDICINE

## 2022-03-17 PROCEDURE — 3008F BODY MASS INDEX DOCD: CPT | Performed by: INTERNAL MEDICINE

## 2022-03-17 PROCEDURE — 3075F SYST BP GE 130 - 139MM HG: CPT | Performed by: INTERNAL MEDICINE

## 2022-03-17 PROCEDURE — 99396 PREV VISIT EST AGE 40-64: CPT | Performed by: INTERNAL MEDICINE

## 2022-03-17 RX ORDER — LOSARTAN POTASSIUM AND HYDROCHLOROTHIAZIDE 12.5; 5 MG/1; MG/1
1 TABLET ORAL DAILY
Qty: 90 TABLET | Refills: 1 | Status: SHIPPED | OUTPATIENT
Start: 2022-03-17

## 2022-03-17 RX ORDER — TAMSULOSIN HYDROCHLORIDE 0.4 MG/1
0.8 CAPSULE ORAL DAILY
Qty: 180 CAPSULE | Refills: 1 | Status: SHIPPED | OUTPATIENT
Start: 2022-03-17

## 2022-06-29 ENCOUNTER — TELEPHONE (OUTPATIENT)
Dept: INTERNAL MEDICINE CLINIC | Facility: CLINIC | Age: 67
End: 2022-06-29

## 2022-06-29 NOTE — TELEPHONE ENCOUNTER
Pt is calling stating has diarrhea for 5 days. Pt states no stomach pain or vomiting just diarrhea.         Please call and advise

## 2022-07-07 DIAGNOSIS — I10 ESSENTIAL HYPERTENSION: ICD-10-CM

## 2022-07-11 RX ORDER — LOSARTAN POTASSIUM AND HYDROCHLOROTHIAZIDE 12.5; 5 MG/1; MG/1
1 TABLET ORAL DAILY
Qty: 90 TABLET | Refills: 1 | OUTPATIENT
Start: 2022-07-11

## 2022-08-30 ENCOUNTER — LAB ENCOUNTER (OUTPATIENT)
Dept: LAB | Facility: HOSPITAL | Age: 67
End: 2022-08-30
Attending: NURSE PRACTITIONER
Payer: COMMERCIAL

## 2022-08-30 ENCOUNTER — TELEPHONE (OUTPATIENT)
Dept: HEMATOLOGY/ONCOLOGY | Facility: HOSPITAL | Age: 67
End: 2022-08-30

## 2022-08-30 DIAGNOSIS — Z85.72 HISTORY OF DIFFUSE LARGE B-CELL LYMPHOMA: Primary | ICD-10-CM

## 2022-08-30 DIAGNOSIS — Z85.72 HISTORY OF DIFFUSE LARGE B-CELL LYMPHOMA: ICD-10-CM

## 2022-08-30 LAB
ALBUMIN SERPL-MCNC: 3.9 G/DL (ref 3.4–5)
ALBUMIN/GLOB SERPL: 1 {RATIO} (ref 1–2)
ALP LIVER SERPL-CCNC: 74 U/L
ALT SERPL-CCNC: 36 U/L
ANION GAP SERPL CALC-SCNC: 4 MMOL/L (ref 0–18)
AST SERPL-CCNC: 17 U/L (ref 15–37)
BASOPHILS # BLD AUTO: 0.05 X10(3) UL (ref 0–0.2)
BASOPHILS NFR BLD AUTO: 0.7 %
BILIRUB SERPL-MCNC: 0.3 MG/DL (ref 0.1–2)
BUN BLD-MCNC: 23 MG/DL (ref 7–18)
BUN/CREAT SERPL: 26.7 (ref 10–20)
CALCIUM BLD-MCNC: 9 MG/DL (ref 8.5–10.1)
CHLORIDE SERPL-SCNC: 104 MMOL/L (ref 98–112)
CO2 SERPL-SCNC: 29 MMOL/L (ref 21–32)
CREAT BLD-MCNC: 0.86 MG/DL
DEPRECATED RDW RBC AUTO: 47.5 FL (ref 35.1–46.3)
EOSINOPHIL # BLD AUTO: 0.14 X10(3) UL (ref 0–0.7)
EOSINOPHIL NFR BLD AUTO: 2 %
ERYTHROCYTE [DISTWIDTH] IN BLOOD BY AUTOMATED COUNT: 13.1 % (ref 11–15)
FASTING STATUS PATIENT QL REPORTED: NO
GFR SERPLBLD BASED ON 1.73 SQ M-ARVRAT: 96 ML/MIN/1.73M2 (ref 60–?)
GLOBULIN PLAS-MCNC: 3.8 G/DL (ref 2.8–4.4)
GLUCOSE BLD-MCNC: 126 MG/DL (ref 70–99)
HCT VFR BLD AUTO: 37.7 %
HGB BLD-MCNC: 12.3 G/DL
IMM GRANULOCYTES # BLD AUTO: 0.01 X10(3) UL (ref 0–1)
IMM GRANULOCYTES NFR BLD: 0.1 %
LDH SERPL L TO P-CCNC: 188 U/L
LYMPHOCYTES # BLD AUTO: 2.98 X10(3) UL (ref 1–4)
LYMPHOCYTES NFR BLD AUTO: 43.1 %
MCH RBC QN AUTO: 32.3 PG (ref 26–34)
MCHC RBC AUTO-ENTMCNC: 32.6 G/DL (ref 31–37)
MCV RBC AUTO: 99 FL
MONOCYTES # BLD AUTO: 0.42 X10(3) UL (ref 0.1–1)
MONOCYTES NFR BLD AUTO: 6.1 %
NEUTROPHILS # BLD AUTO: 3.32 X10 (3) UL (ref 1.5–7.7)
NEUTROPHILS # BLD AUTO: 3.32 X10(3) UL (ref 1.5–7.7)
NEUTROPHILS NFR BLD AUTO: 48 %
OSMOLALITY SERPL CALC.SUM OF ELEC: 289 MOSM/KG (ref 275–295)
PLATELET # BLD AUTO: 203 10(3)UL (ref 150–450)
POTASSIUM SERPL-SCNC: 3.7 MMOL/L (ref 3.5–5.1)
PROT SERPL-MCNC: 7.7 G/DL (ref 6.4–8.2)
RBC # BLD AUTO: 3.81 X10(6)UL
SODIUM SERPL-SCNC: 137 MMOL/L (ref 136–145)
WBC # BLD AUTO: 6.9 X10(3) UL (ref 4–11)

## 2022-08-30 PROCEDURE — 80053 COMPREHEN METABOLIC PANEL: CPT

## 2022-08-30 PROCEDURE — 85025 COMPLETE CBC W/AUTO DIFF WBC: CPT

## 2022-08-30 PROCEDURE — 83615 LACTATE (LD) (LDH) ENZYME: CPT

## 2022-08-30 PROCEDURE — 36415 COLL VENOUS BLD VENIPUNCTURE: CPT

## 2022-08-30 NOTE — TELEPHONE ENCOUNTER
Message from Answering Gay. Rona calling from [facility]. [id number]    Patient has appointment on 8/31/2022 at 4pm.  Needs lab work done first.   There are no orders in the system . Rosmery Mcghee   Please fax lab orders to [phone]  So labs can be drawn

## 2022-08-31 ENCOUNTER — APPOINTMENT (OUTPATIENT)
Dept: HEMATOLOGY/ONCOLOGY | Facility: HOSPITAL | Age: 67
End: 2022-08-31
Attending: INTERNAL MEDICINE
Payer: COMMERCIAL

## 2022-09-12 DIAGNOSIS — I10 ESSENTIAL HYPERTENSION: ICD-10-CM

## 2022-09-12 RX ORDER — LOSARTAN POTASSIUM AND HYDROCHLOROTHIAZIDE 12.5; 5 MG/1; MG/1
1 TABLET ORAL DAILY
Qty: 90 TABLET | Refills: 1 | OUTPATIENT
Start: 2022-09-12

## 2022-09-20 ENCOUNTER — TELEPHONE (OUTPATIENT)
Dept: INTERNAL MEDICINE CLINIC | Facility: CLINIC | Age: 67
End: 2022-09-20

## 2022-09-20 NOTE — TELEPHONE ENCOUNTER
Pt is calling stating that he has appointment tomorrow with dr. Nora Rubio but when to get blood test done but was told there is no order put in. Pt was told by doctor needed to get blood work done.        Please call and advise

## 2022-09-20 NOTE — TELEPHONE ENCOUNTER
The only test he is missing to get done is the A1c and will be done here in the office tomorrow, patient aware.

## 2022-09-21 ENCOUNTER — OFFICE VISIT (OUTPATIENT)
Dept: INTERNAL MEDICINE CLINIC | Facility: CLINIC | Age: 67
End: 2022-09-21
Payer: COMMERCIAL

## 2022-09-21 VITALS
WEIGHT: 185 LBS | DIASTOLIC BLOOD PRESSURE: 64 MMHG | BODY MASS INDEX: 27 KG/M2 | HEART RATE: 78 BPM | SYSTOLIC BLOOD PRESSURE: 116 MMHG | OXYGEN SATURATION: 97 % | TEMPERATURE: 97 F

## 2022-09-21 DIAGNOSIS — E11.8 CONTROLLED TYPE 2 DIABETES MELLITUS WITH COMPLICATION, WITHOUT LONG-TERM CURRENT USE OF INSULIN (HCC): Primary | ICD-10-CM

## 2022-09-21 DIAGNOSIS — R39.11 URINARY HESITANCY: ICD-10-CM

## 2022-09-21 DIAGNOSIS — E11.9 CONTROLLED TYPE 2 DIABETES MELLITUS WITHOUT COMPLICATION, WITHOUT LONG-TERM CURRENT USE OF INSULIN (HCC): ICD-10-CM

## 2022-09-21 DIAGNOSIS — Z13.89 NEPHROPATHY SCREEN: ICD-10-CM

## 2022-09-21 DIAGNOSIS — I10 ESSENTIAL HYPERTENSION: ICD-10-CM

## 2022-09-21 LAB
CARTRIDGE LOT#: 978 NUMERIC
CREAT UR-SCNC: <13 MG/DL
CUVETTE LOT #: 978 NUMERIC
HEMOGLOBIN A1C: 6.2 % (ref 4.3–5.6)
HEMOGLOBIN: 6.2 G/DL (ref 13–17)
MICROALBUMIN UR-MCNC: <0.5 MG/DL

## 2022-09-21 PROCEDURE — 82570 ASSAY OF URINE CREATININE: CPT | Performed by: INTERNAL MEDICINE

## 2022-09-21 PROCEDURE — 82043 UR ALBUMIN QUANTITATIVE: CPT | Performed by: INTERNAL MEDICINE

## 2022-09-21 RX ORDER — GABAPENTIN 300 MG/1
300 CAPSULE ORAL NIGHTLY
Qty: 90 CAPSULE | Refills: 0 | Status: SHIPPED | OUTPATIENT
Start: 2022-09-21

## 2022-09-21 RX ORDER — LOSARTAN POTASSIUM AND HYDROCHLOROTHIAZIDE 12.5; 5 MG/1; MG/1
1 TABLET ORAL DAILY
Qty: 90 TABLET | Refills: 1 | Status: SHIPPED | OUTPATIENT
Start: 2022-09-21

## 2022-09-21 RX ORDER — TAMSULOSIN HYDROCHLORIDE 0.4 MG/1
0.8 CAPSULE ORAL DAILY
Qty: 180 CAPSULE | Refills: 1 | Status: SHIPPED | OUTPATIENT
Start: 2022-09-21

## 2022-10-26 ENCOUNTER — NURSE ONLY (OUTPATIENT)
Dept: INTERNAL MEDICINE CLINIC | Facility: CLINIC | Age: 67
End: 2022-10-26
Payer: COMMERCIAL

## 2022-10-26 DIAGNOSIS — Z23 NEED FOR INFLUENZA VACCINATION: Primary | ICD-10-CM

## 2022-10-26 PROCEDURE — 90662 IIV NO PRSV INCREASED AG IM: CPT | Performed by: INTERNAL MEDICINE

## 2022-10-26 PROCEDURE — 90471 IMMUNIZATION ADMIN: CPT | Performed by: INTERNAL MEDICINE

## 2023-01-23 NOTE — PROGRESS NOTES
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 No Complaints, feeling well, denies fever, chills or dysuria or hematuria. Only c/o some fatigue, continues to work.  Requests to have port accessed Problem: Pressure Injury - Risk of  Goal: *Prevention of pressure injury  Description: Document Uday Scale and appropriate interventions in the flowsheet. Outcome: Progressing Towards Goal  Note: Pressure Injury Interventions:  Sensory Interventions: Minimize linen layers, Pressure redistribution bed/mattress (bed type)    Moisture Interventions: Absorbent underpads    Activity Interventions: Increase time out of bed, Pressure redistribution bed/mattress(bed type), PT/OT evaluation    Mobility Interventions: Pressure redistribution bed/mattress (bed type), PT/OT evaluation    Nutrition Interventions: Document food/fluid/supplement intake    Friction and Shear Interventions: Minimize layers                Problem: Patient Education: Go to Patient Education Activity  Goal: Patient/Family Education  Outcome: Progressing Towards Goal     Problem: Falls - Risk of  Goal: *Absence of Falls  Description: Document Shaheen Fall Risk and appropriate interventions in the flowsheet.   Outcome: Progressing Towards Goal  Note: Fall Risk Interventions:  Mobility Interventions: Bed/chair exit alarm, Patient to call before getting OOB, PT Consult for mobility concerns, PT Consult for assist device competence, Utilize walker, cane, or other assistive device, Strengthening exercises (ROM-active/passive)         Medication Interventions: Assess postural VS orthostatic hypotension, Bed/chair exit alarm, Patient to call before getting OOB, Teach patient to arise slowly    Elimination Interventions: Bed/chair exit alarm, Call light in reach    History of Falls Interventions: Bed/chair exit alarm, Door open when patient unattended, Room close to nurse's station         Problem: Patient Education: Go to Patient Education Activity  Goal: Patient/Family Education  Outcome: Progressing Towards Goal     Problem: Risk for Spread of Infection  Goal: Prevent transmission of infectious organism to others  Description: Prevent the transmission of infectious organisms to other patients, staff members, and visitors.   Outcome: Progressing Towards Goal     Problem: Patient Education:  Go to Education Activity  Goal: Patient/Family Education  Outcome: Progressing Towards Goal     Problem: Pain  Goal: *Control of Pain  Outcome: Progressing Towards Goal     Problem: Nausea/Vomiting (Adult)  Goal: *Absence of nausea/vomiting  Outcome: Progressing Towards Goal     Problem: Dysphagia (Adult)  Goal: *Speech Goal: (INSERT TEXT)  Outcome: Progressing Towards Goal     Problem: Fluid Volume - Risk of, Imbalanced  Goal: *Balanced intake and output  Outcome: Progressing Towards Goal     Problem: Patient Education: Go to Patient Education Activity  Goal: Patient/Family Education  Outcome: Progressing Towards Goal     Problem: Patient Education: Go to Patient Education Activity  Goal: Patient/Family Education  Outcome: Progressing Towards Goal

## 2023-02-06 DIAGNOSIS — Z08 ENCOUNTER FOR FOLLOW-UP SURVEILLANCE OF DIFFUSE LARGE B-CELL LYMPHOMA: Primary | ICD-10-CM

## 2023-02-06 DIAGNOSIS — Z85.79 ENCOUNTER FOR FOLLOW-UP SURVEILLANCE OF DIFFUSE LARGE B-CELL LYMPHOMA: Primary | ICD-10-CM

## 2023-02-23 ENCOUNTER — LAB ENCOUNTER (OUTPATIENT)
Dept: LAB | Facility: HOSPITAL | Age: 68
End: 2023-02-23
Attending: INTERNAL MEDICINE
Payer: COMMERCIAL

## 2023-02-23 DIAGNOSIS — Z08 ENCOUNTER FOR FOLLOW-UP SURVEILLANCE OF DIFFUSE LARGE B-CELL LYMPHOMA: ICD-10-CM

## 2023-02-23 DIAGNOSIS — Z85.79 ENCOUNTER FOR FOLLOW-UP SURVEILLANCE OF DIFFUSE LARGE B-CELL LYMPHOMA: ICD-10-CM

## 2023-02-23 LAB
ALBUMIN SERPL-MCNC: 4 G/DL (ref 3.4–5)
ALBUMIN/GLOB SERPL: 1.1 {RATIO} (ref 1–2)
ALP LIVER SERPL-CCNC: 85 U/L
ALT SERPL-CCNC: 40 U/L
ANION GAP SERPL CALC-SCNC: 5 MMOL/L (ref 0–18)
AST SERPL-CCNC: 23 U/L (ref 15–37)
BASOPHILS # BLD AUTO: 0.04 X10(3) UL (ref 0–0.2)
BASOPHILS NFR BLD AUTO: 0.6 %
BILIRUB SERPL-MCNC: 0.4 MG/DL (ref 0.1–2)
BUN BLD-MCNC: 20 MG/DL (ref 7–18)
BUN/CREAT SERPL: 24.1 (ref 10–20)
CALCIUM BLD-MCNC: 9.3 MG/DL (ref 8.5–10.1)
CHLORIDE SERPL-SCNC: 105 MMOL/L (ref 98–112)
CO2 SERPL-SCNC: 27 MMOL/L (ref 21–32)
CREAT BLD-MCNC: 0.83 MG/DL
DEPRECATED RDW RBC AUTO: 44.5 FL (ref 35.1–46.3)
EOSINOPHIL # BLD AUTO: 0.13 X10(3) UL (ref 0–0.7)
EOSINOPHIL NFR BLD AUTO: 2 %
ERYTHROCYTE [DISTWIDTH] IN BLOOD BY AUTOMATED COUNT: 12.9 % (ref 11–15)
FASTING STATUS PATIENT QL REPORTED: NO
GFR SERPLBLD BASED ON 1.73 SQ M-ARVRAT: 97 ML/MIN/1.73M2 (ref 60–?)
GLOBULIN PLAS-MCNC: 3.7 G/DL (ref 2.8–4.4)
GLUCOSE BLD-MCNC: 151 MG/DL (ref 70–99)
HCT VFR BLD AUTO: 36.7 %
HGB BLD-MCNC: 12.6 G/DL
IMM GRANULOCYTES # BLD AUTO: 0.01 X10(3) UL (ref 0–1)
IMM GRANULOCYTES NFR BLD: 0.2 %
LDH SERPL L TO P-CCNC: 160 U/L
LYMPHOCYTES # BLD AUTO: 3.13 X10(3) UL (ref 1–4)
LYMPHOCYTES NFR BLD AUTO: 48.2 %
MCH RBC QN AUTO: 32.6 PG (ref 26–34)
MCHC RBC AUTO-ENTMCNC: 34.3 G/DL (ref 31–37)
MCV RBC AUTO: 95.1 FL
MONOCYTES # BLD AUTO: 0.41 X10(3) UL (ref 0.1–1)
MONOCYTES NFR BLD AUTO: 6.3 %
NEUTROPHILS # BLD AUTO: 2.78 X10 (3) UL (ref 1.5–7.7)
NEUTROPHILS # BLD AUTO: 2.78 X10(3) UL (ref 1.5–7.7)
NEUTROPHILS NFR BLD AUTO: 42.7 %
OSMOLALITY SERPL CALC.SUM OF ELEC: 290 MOSM/KG (ref 275–295)
PLATELET # BLD AUTO: 189 10(3)UL (ref 150–450)
POTASSIUM SERPL-SCNC: 3.7 MMOL/L (ref 3.5–5.1)
PROT SERPL-MCNC: 7.7 G/DL (ref 6.4–8.2)
RBC # BLD AUTO: 3.86 X10(6)UL
SODIUM SERPL-SCNC: 137 MMOL/L (ref 136–145)
WBC # BLD AUTO: 6.5 X10(3) UL (ref 4–11)

## 2023-02-23 PROCEDURE — 36415 COLL VENOUS BLD VENIPUNCTURE: CPT

## 2023-02-23 PROCEDURE — 83615 LACTATE (LD) (LDH) ENZYME: CPT

## 2023-02-23 PROCEDURE — 85025 COMPLETE CBC W/AUTO DIFF WBC: CPT

## 2023-02-23 PROCEDURE — 80053 COMPREHEN METABOLIC PANEL: CPT

## 2023-02-24 ENCOUNTER — APPOINTMENT (OUTPATIENT)
Dept: HEMATOLOGY/ONCOLOGY | Facility: HOSPITAL | Age: 68
End: 2023-02-24
Attending: INTERNAL MEDICINE
Payer: COMMERCIAL

## 2023-02-24 VITALS
HEART RATE: 56 BPM | DIASTOLIC BLOOD PRESSURE: 69 MMHG | OXYGEN SATURATION: 98 % | RESPIRATION RATE: 18 BRPM | BODY MASS INDEX: 26.63 KG/M2 | TEMPERATURE: 98 F | SYSTOLIC BLOOD PRESSURE: 130 MMHG | HEIGHT: 70 IN | WEIGHT: 186 LBS

## 2023-02-24 DIAGNOSIS — Z85.79 ENCOUNTER FOR FOLLOW-UP SURVEILLANCE OF DIFFUSE LARGE B-CELL LYMPHOMA: ICD-10-CM

## 2023-02-24 DIAGNOSIS — Z08 ENCOUNTER FOR FOLLOW-UP SURVEILLANCE OF DIFFUSE LARGE B-CELL LYMPHOMA: ICD-10-CM

## 2023-02-24 DIAGNOSIS — Z85.72 HISTORY OF DIFFUSE LARGE B-CELL LYMPHOMA: Primary | ICD-10-CM

## 2023-02-24 PROCEDURE — 99213 OFFICE O/P EST LOW 20 MIN: CPT | Performed by: INTERNAL MEDICINE

## 2023-05-02 ENCOUNTER — OFFICE VISIT (OUTPATIENT)
Dept: INTERNAL MEDICINE CLINIC | Facility: CLINIC | Age: 68
End: 2023-05-02
Payer: COMMERCIAL

## 2023-05-02 VITALS
HEIGHT: 70.5 IN | WEIGHT: 189 LBS | TEMPERATURE: 97 F | OXYGEN SATURATION: 99 % | DIASTOLIC BLOOD PRESSURE: 64 MMHG | HEART RATE: 65 BPM | BODY MASS INDEX: 26.76 KG/M2 | SYSTOLIC BLOOD PRESSURE: 108 MMHG

## 2023-05-02 DIAGNOSIS — R39.11 URINARY HESITANCY: ICD-10-CM

## 2023-05-02 DIAGNOSIS — E11.9 CONTROLLED TYPE 2 DIABETES MELLITUS WITHOUT COMPLICATION, WITHOUT LONG-TERM CURRENT USE OF INSULIN (HCC): ICD-10-CM

## 2023-05-02 DIAGNOSIS — R97.20 ELEVATED PSA MEASUREMENT: ICD-10-CM

## 2023-05-02 DIAGNOSIS — Z00.00 ANNUAL PHYSICAL EXAM: Primary | ICD-10-CM

## 2023-05-02 DIAGNOSIS — I10 ESSENTIAL HYPERTENSION: ICD-10-CM

## 2023-05-02 DIAGNOSIS — Z85.46 HISTORY OF PROSTATE CANCER: ICD-10-CM

## 2023-05-02 PROCEDURE — 99214 OFFICE O/P EST MOD 30 MIN: CPT | Performed by: INTERNAL MEDICINE

## 2023-05-02 PROCEDURE — 3074F SYST BP LT 130 MM HG: CPT | Performed by: INTERNAL MEDICINE

## 2023-05-02 PROCEDURE — 3008F BODY MASS INDEX DOCD: CPT | Performed by: INTERNAL MEDICINE

## 2023-05-02 PROCEDURE — 99397 PER PM REEVAL EST PAT 65+ YR: CPT | Performed by: INTERNAL MEDICINE

## 2023-05-02 PROCEDURE — 3078F DIAST BP <80 MM HG: CPT | Performed by: INTERNAL MEDICINE

## 2023-05-02 RX ORDER — LOSARTAN POTASSIUM AND HYDROCHLOROTHIAZIDE 12.5; 5 MG/1; MG/1
1 TABLET ORAL DAILY
Qty: 30 TABLET | Refills: 0 | Status: SHIPPED | OUTPATIENT
Start: 2023-05-02

## 2023-05-02 RX ORDER — TAMSULOSIN HYDROCHLORIDE 0.4 MG/1
0.8 CAPSULE ORAL DAILY
Qty: 180 CAPSULE | Refills: 1 | Status: SHIPPED | OUTPATIENT
Start: 2023-05-02

## 2023-05-02 RX ORDER — TADALAFIL 20 MG/1
20 TABLET ORAL AS NEEDED
Qty: 24 TABLET | Refills: 2 | Status: SHIPPED | OUTPATIENT
Start: 2023-05-02 | End: 2023-05-08

## 2023-05-03 ENCOUNTER — LAB ENCOUNTER (OUTPATIENT)
Dept: LAB | Facility: HOSPITAL | Age: 68
End: 2023-05-03
Attending: INTERNAL MEDICINE
Payer: COMMERCIAL

## 2023-05-03 DIAGNOSIS — Z85.46 HISTORY OF PROSTATE CANCER: ICD-10-CM

## 2023-05-03 DIAGNOSIS — E11.9 CONTROLLED TYPE 2 DIABETES MELLITUS WITHOUT COMPLICATION, WITHOUT LONG-TERM CURRENT USE OF INSULIN (HCC): ICD-10-CM

## 2023-05-03 LAB
ALBUMIN SERPL-MCNC: 4.1 G/DL (ref 3.4–5)
ALBUMIN/GLOB SERPL: 1.1 {RATIO} (ref 1–2)
ALP LIVER SERPL-CCNC: 75 U/L
ALT SERPL-CCNC: 38 U/L
ANION GAP SERPL CALC-SCNC: 6 MMOL/L (ref 0–18)
AST SERPL-CCNC: 21 U/L (ref 15–37)
BASOPHILS # BLD AUTO: 0.05 X10(3) UL (ref 0–0.2)
BASOPHILS NFR BLD AUTO: 0.9 %
BILIRUB SERPL-MCNC: 0.4 MG/DL (ref 0.1–2)
BUN BLD-MCNC: 19 MG/DL (ref 7–18)
BUN/CREAT SERPL: 25.3 (ref 10–20)
CALCIUM BLD-MCNC: 9 MG/DL (ref 8.5–10.1)
CHLORIDE SERPL-SCNC: 105 MMOL/L (ref 98–112)
CO2 SERPL-SCNC: 26 MMOL/L (ref 21–32)
CREAT BLD-MCNC: 0.75 MG/DL
DEPRECATED RDW RBC AUTO: 44.6 FL (ref 35.1–46.3)
EOSINOPHIL # BLD AUTO: 0.09 X10(3) UL (ref 0–0.7)
EOSINOPHIL NFR BLD AUTO: 1.6 %
ERYTHROCYTE [DISTWIDTH] IN BLOOD BY AUTOMATED COUNT: 12.7 % (ref 11–15)
EST. AVERAGE GLUCOSE BLD GHB EST-MCNC: 194 MG/DL (ref 68–126)
FASTING STATUS PATIENT QL REPORTED: NO
GFR SERPLBLD BASED ON 1.73 SQ M-ARVRAT: 100 ML/MIN/1.73M2 (ref 60–?)
GLOBULIN PLAS-MCNC: 3.8 G/DL (ref 2.8–4.4)
GLUCOSE BLD-MCNC: 137 MG/DL (ref 70–99)
HBA1C MFR BLD: 8.4 % (ref ?–5.7)
HCT VFR BLD AUTO: 37.6 %
HGB BLD-MCNC: 12.6 G/DL
IMM GRANULOCYTES # BLD AUTO: 0.01 X10(3) UL (ref 0–1)
IMM GRANULOCYTES NFR BLD: 0.2 %
LYMPHOCYTES # BLD AUTO: 2.59 X10(3) UL (ref 1–4)
LYMPHOCYTES NFR BLD AUTO: 45 %
MCH RBC QN AUTO: 31.9 PG (ref 26–34)
MCHC RBC AUTO-ENTMCNC: 33.5 G/DL (ref 31–37)
MCV RBC AUTO: 95.2 FL
MONOCYTES # BLD AUTO: 0.39 X10(3) UL (ref 0.1–1)
MONOCYTES NFR BLD AUTO: 6.8 %
NEUTROPHILS # BLD AUTO: 2.62 X10 (3) UL (ref 1.5–7.7)
NEUTROPHILS # BLD AUTO: 2.62 X10(3) UL (ref 1.5–7.7)
NEUTROPHILS NFR BLD AUTO: 45.5 %
OSMOLALITY SERPL CALC.SUM OF ELEC: 288 MOSM/KG (ref 275–295)
PLATELET # BLD AUTO: 209 10(3)UL (ref 150–450)
POTASSIUM SERPL-SCNC: 3.7 MMOL/L (ref 3.5–5.1)
PROT SERPL-MCNC: 7.9 G/DL (ref 6.4–8.2)
PSA FREE MFR SERPL: 9 %
PSA FREE SERPL-MCNC: 0.26 NG/ML
PSA SERPL-MCNC: 2.93 NG/ML (ref ?–4)
RBC # BLD AUTO: 3.95 X10(6)UL
SODIUM SERPL-SCNC: 137 MMOL/L (ref 136–145)
WBC # BLD AUTO: 5.8 X10(3) UL (ref 4–11)

## 2023-05-03 PROCEDURE — 3052F HG A1C>EQUAL 8.0%<EQUAL 9.0%: CPT | Performed by: INTERNAL MEDICINE

## 2023-05-03 PROCEDURE — 83036 HEMOGLOBIN GLYCOSYLATED A1C: CPT

## 2023-05-03 PROCEDURE — 84154 ASSAY OF PSA FREE: CPT

## 2023-05-03 PROCEDURE — 85025 COMPLETE CBC W/AUTO DIFF WBC: CPT | Performed by: INTERNAL MEDICINE

## 2023-05-03 PROCEDURE — 80053 COMPREHEN METABOLIC PANEL: CPT

## 2023-05-03 PROCEDURE — 84153 ASSAY OF PSA TOTAL: CPT

## 2023-05-03 PROCEDURE — 36415 COLL VENOUS BLD VENIPUNCTURE: CPT

## 2023-05-04 RX ORDER — GLIPIZIDE 5 MG/1
5 TABLET, FILM COATED, EXTENDED RELEASE ORAL DAILY
Qty: 90 TABLET | Refills: 0 | Status: SHIPPED | OUTPATIENT
Start: 2023-05-04

## 2023-05-04 NOTE — PROGRESS NOTES
Attempted to contact patient to discuss results. No answer. Mychart sent . Your labs have been reviewed. Your hemoglobin A1C is A1C is above 8.0. This is considered uncontrolled. I recommend focusing on dietary changes. Continue metformin. Increase consumption of vegetables, fruits, herbs, nuts, beans and whole grains. Decrease intake of saturated fats, processed foods, meats and sweets. Moderate amounts of dairy, poultry and seafood. Consider starting glipizide 5 mg. If you agree I have sent this to your pharmacy. Please follow up in 3 months for repeat A1C in our office. Rest of labs with no sign abnormality. PSA is wnl. Recommend to still follow up with urology.

## 2023-05-05 ENCOUNTER — TELEPHONE (OUTPATIENT)
Dept: INTERNAL MEDICINE CLINIC | Facility: CLINIC | Age: 68
End: 2023-05-05

## 2023-05-08 ENCOUNTER — TELEPHONE (OUTPATIENT)
Dept: INTERNAL MEDICINE CLINIC | Facility: CLINIC | Age: 68
End: 2023-05-08

## 2023-05-08 RX ORDER — TADALAFIL 20 MG/1
20 TABLET ORAL
Qty: 24 TABLET | Refills: 2 | Status: SHIPPED | OUTPATIENT
Start: 2023-05-08

## 2023-06-28 ENCOUNTER — OFFICE VISIT (OUTPATIENT)
Dept: SURGERY | Facility: CLINIC | Age: 68
End: 2023-06-28

## 2023-06-28 VITALS
HEART RATE: 69 BPM | WEIGHT: 189 LBS | SYSTOLIC BLOOD PRESSURE: 151 MMHG | BODY MASS INDEX: 27 KG/M2 | DIASTOLIC BLOOD PRESSURE: 84 MMHG

## 2023-06-28 DIAGNOSIS — N13.8 BPH WITH OBSTRUCTION/LOWER URINARY TRACT SYMPTOMS: ICD-10-CM

## 2023-06-28 DIAGNOSIS — N52.9 ERECTILE DYSFUNCTION, UNSPECIFIED ERECTILE DYSFUNCTION TYPE: ICD-10-CM

## 2023-06-28 DIAGNOSIS — C61 PROSTATE CANCER (HCC): Primary | ICD-10-CM

## 2023-06-28 DIAGNOSIS — N40.1 BPH WITH OBSTRUCTION/LOWER URINARY TRACT SYMPTOMS: ICD-10-CM

## 2023-06-28 PROCEDURE — 3077F SYST BP >= 140 MM HG: CPT | Performed by: UROLOGY

## 2023-06-28 PROCEDURE — 3079F DIAST BP 80-89 MM HG: CPT | Performed by: UROLOGY

## 2023-06-28 PROCEDURE — 99215 OFFICE O/P EST HI 40 MIN: CPT | Performed by: UROLOGY

## 2023-08-29 ENCOUNTER — OFFICE VISIT (OUTPATIENT)
Dept: INTERNAL MEDICINE CLINIC | Facility: CLINIC | Age: 68
End: 2023-08-29
Payer: COMMERCIAL

## 2023-08-29 VITALS
OXYGEN SATURATION: 98 % | TEMPERATURE: 98 F | HEART RATE: 53 BPM | SYSTOLIC BLOOD PRESSURE: 112 MMHG | DIASTOLIC BLOOD PRESSURE: 60 MMHG | WEIGHT: 189 LBS | BODY MASS INDEX: 27 KG/M2

## 2023-08-29 DIAGNOSIS — E11.65 UNCONTROLLED TYPE 2 DIABETES MELLITUS WITH HYPERGLYCEMIA (HCC): Primary | ICD-10-CM

## 2023-08-29 LAB
CARTRIDGE LOT#: 611 NUMERIC
HEMOGLOBIN A1C: 7.1 % (ref 4.3–5.6)

## 2023-08-29 PROCEDURE — 3051F HG A1C>EQUAL 7.0%<8.0%: CPT | Performed by: INTERNAL MEDICINE

## 2023-08-29 PROCEDURE — 83036 HEMOGLOBIN GLYCOSYLATED A1C: CPT | Performed by: INTERNAL MEDICINE

## 2023-08-29 PROCEDURE — 3078F DIAST BP <80 MM HG: CPT | Performed by: INTERNAL MEDICINE

## 2023-08-29 PROCEDURE — 99214 OFFICE O/P EST MOD 30 MIN: CPT | Performed by: INTERNAL MEDICINE

## 2023-08-29 PROCEDURE — 3074F SYST BP LT 130 MM HG: CPT | Performed by: INTERNAL MEDICINE

## 2023-08-29 RX ORDER — LOSARTAN POTASSIUM AND HYDROCHLOROTHIAZIDE 12.5; 5 MG/1; MG/1
1 TABLET ORAL DAILY
Qty: 90 TABLET | Refills: 1 | Status: SHIPPED | OUTPATIENT
Start: 2023-08-29

## 2023-08-29 RX ORDER — GLIPIZIDE 5 MG/1
5 TABLET, FILM COATED, EXTENDED RELEASE ORAL DAILY
Qty: 90 TABLET | Refills: 1 | Status: SHIPPED | OUTPATIENT
Start: 2023-08-29

## 2023-08-31 ENCOUNTER — HOSPITAL ENCOUNTER (OUTPATIENT)
Dept: MRI IMAGING | Facility: HOSPITAL | Age: 68
Discharge: HOME | End: 2023-08-31
Attending: UROLOGY
Payer: COMMERCIAL

## 2023-08-31 DIAGNOSIS — C61 PROSTATE CANCER (HCC): ICD-10-CM

## 2023-08-31 PROCEDURE — A9575 INJ GADOTERATE MEGLUMI 0.1ML: HCPCS

## 2023-08-31 PROCEDURE — 72197 MRI PELVIS W/O & W/DYE: CPT | Performed by: UROLOGY

## 2023-08-31 RX ORDER — GADOTERATE MEGLUMINE 376.9 MG/ML
20 INJECTION INTRAVENOUS
Status: COMPLETED | OUTPATIENT
Start: 2023-08-31 | End: 2023-08-31

## 2023-08-31 RX ADMIN — GADOTERATE MEGLUMINE 17 ML: 376.9 INJECTION INTRAVENOUS at 15:31:00

## 2023-10-27 ENCOUNTER — TELEPHONE (OUTPATIENT)
Dept: INTERNAL MEDICINE CLINIC | Facility: CLINIC | Age: 68
End: 2023-10-27

## 2023-10-27 NOTE — TELEPHONE ENCOUNTER
[place] and [place] in February. Last eye exam was normal with no retinopathy, HM was updated.

## 2024-02-20 ENCOUNTER — TELEPHONE (OUTPATIENT)
Facility: CLINIC | Age: 69
End: 2024-02-20

## 2024-02-20 NOTE — TELEPHONE ENCOUNTER
----- Message from Daksha Page CMA sent at 4/29/2019  1:23 PM CDT -----  Regarding: recall colon  Recall colon for 5 years per . Last colon done 4/20/19

## 2024-02-23 DIAGNOSIS — Z85.72 HISTORY OF DIFFUSE LARGE B-CELL LYMPHOMA: Primary | ICD-10-CM

## 2024-02-26 ENCOUNTER — APPOINTMENT (OUTPATIENT)
Dept: HEMATOLOGY/ONCOLOGY | Facility: HOSPITAL | Age: 69
End: 2024-02-26
Attending: INTERNAL MEDICINE

## 2024-05-28 ENCOUNTER — OFFICE VISIT (OUTPATIENT)
Dept: INTERNAL MEDICINE CLINIC | Facility: CLINIC | Age: 69
End: 2024-05-28

## 2024-05-28 VITALS
SYSTOLIC BLOOD PRESSURE: 120 MMHG | WEIGHT: 184 LBS | HEIGHT: 70.5 IN | DIASTOLIC BLOOD PRESSURE: 74 MMHG | TEMPERATURE: 98 F | OXYGEN SATURATION: 98 % | HEART RATE: 74 BPM | BODY MASS INDEX: 26.05 KG/M2

## 2024-05-28 DIAGNOSIS — C85.99 LYMPHOMA OF TESTIS (HCC): ICD-10-CM

## 2024-05-28 DIAGNOSIS — R01.1 HEART MURMUR: ICD-10-CM

## 2024-05-28 DIAGNOSIS — N40.1 BENIGN PROSTATIC HYPERPLASIA WITH LOWER URINARY TRACT SYMPTOMS, SYMPTOM DETAILS UNSPECIFIED: ICD-10-CM

## 2024-05-28 DIAGNOSIS — Z00.00 HEALTH MAINTENANCE EXAMINATION: ICD-10-CM

## 2024-05-28 DIAGNOSIS — Z85.72 HISTORY OF DIFFUSE LARGE B-CELL LYMPHOMA: ICD-10-CM

## 2024-05-28 DIAGNOSIS — E11.8 CONTROLLED TYPE 2 DIABETES MELLITUS WITH COMPLICATION, WITHOUT LONG-TERM CURRENT USE OF INSULIN (HCC): Primary | ICD-10-CM

## 2024-05-28 DIAGNOSIS — C61 PROSTATE CANCER (HCC): ICD-10-CM

## 2024-05-28 DIAGNOSIS — H25.9 AGE-RELATED CATARACT OF BOTH EYES, UNSPECIFIED AGE-RELATED CATARACT TYPE: ICD-10-CM

## 2024-05-28 PROBLEM — H04.129 DRY EYE: Status: RESOLVED | Noted: 2021-11-10 | Resolved: 2024-05-28

## 2024-05-28 PROBLEM — R97.20 ELEVATED PSA MEASUREMENT: Status: RESOLVED | Noted: 2018-07-30 | Resolved: 2024-05-28

## 2024-05-28 PROBLEM — M25.552 CHRONIC LEFT HIP PAIN: Status: RESOLVED | Noted: 2018-10-30 | Resolved: 2024-05-28

## 2024-05-28 PROBLEM — N40.0 BPH (BENIGN PROSTATIC HYPERPLASIA): Status: ACTIVE | Noted: 2024-05-28

## 2024-05-28 PROBLEM — Z08 ENCOUNTER FOR FOLLOW-UP SURVEILLANCE OF DIFFUSE LARGE B-CELL LYMPHOMA: Status: RESOLVED | Noted: 2019-07-17 | Resolved: 2024-05-28

## 2024-05-28 PROBLEM — R39.11 URINARY HESITANCY: Status: RESOLVED | Noted: 2018-04-25 | Resolved: 2024-05-28

## 2024-05-28 PROBLEM — N52.01 ERECTILE DYSFUNCTION DUE TO ARTERIAL INSUFFICIENCY: Status: RESOLVED | Noted: 2018-07-30 | Resolved: 2024-05-28

## 2024-05-28 PROBLEM — E78.1 PURE HYPERGLYCERIDEMIA: Status: RESOLVED | Noted: 2017-07-18 | Resolved: 2024-05-28

## 2024-05-28 PROBLEM — K57.30 DIVERTICULOSIS OF LARGE INTESTINE WITHOUT HEMORRHAGE: Status: RESOLVED | Noted: 2019-04-20 | Resolved: 2024-05-28

## 2024-05-28 PROBLEM — Z28.21 IMMUNIZATION NOT CARRIED OUT BECAUSE OF PATIENT REFUSAL: Status: RESOLVED | Noted: 2018-07-30 | Resolved: 2024-05-28

## 2024-05-28 PROBLEM — H04.123 DRY EYE SYNDROME OF BOTH EYES: Status: RESOLVED | Noted: 2018-08-23 | Resolved: 2024-05-28

## 2024-05-28 PROBLEM — M79.661 PAIN OF RIGHT LOWER LEG: Status: RESOLVED | Noted: 2019-10-10 | Resolved: 2024-05-28

## 2024-05-28 PROBLEM — H43.393 VITREOUS FLOATERS OF BOTH EYES: Status: RESOLVED | Noted: 2019-10-14 | Resolved: 2024-05-28

## 2024-05-28 PROBLEM — Z85.79 ENCOUNTER FOR FOLLOW-UP SURVEILLANCE OF DIFFUSE LARGE B-CELL LYMPHOMA: Status: RESOLVED | Noted: 2019-07-17 | Resolved: 2024-05-28

## 2024-05-28 PROBLEM — G89.29 CHRONIC LEFT HIP PAIN: Status: RESOLVED | Noted: 2018-10-30 | Resolved: 2024-05-28

## 2024-05-28 PROCEDURE — G0009 ADMIN PNEUMOCOCCAL VACCINE: HCPCS | Performed by: FAMILY MEDICINE

## 2024-05-28 PROCEDURE — 90677 PCV20 VACCINE IM: CPT | Performed by: FAMILY MEDICINE

## 2024-05-28 RX ORDER — ATORVASTATIN CALCIUM 10 MG/1
10 TABLET, FILM COATED ORAL NIGHTLY
Qty: 90 TABLET | Refills: 0 | Status: SHIPPED | OUTPATIENT
Start: 2024-05-28

## 2024-05-28 NOTE — ASSESSMENT & PLAN NOTE
Patient with a history of diabetes.  He reports that currently he is only on metformin 1000 mg daily  Check A1c, CMP, lipid panel.  Start atorvastatin 10 mg nightly.  Referral to ophthalmology provided.

## 2024-05-28 NOTE — PATIENT INSTRUCTIONS
PATIENT INSTRUCTIONS    Thank you for seeing me today, it was a pleasure taking care of you.  Please check out at the  and schedule a follow up appointment.  Return in about 2 weeks (around 6/11/2024) for medicare visit.  Please remember that the charlene period for all appointments is 5 minutes. This is to help maximize the amount of time that we can spend together at our visits.    Please continue current medications   Start atorvastatin 10 mg nightly   Can stop blood pressure medication   Continue metformin   See ophthalmology Dr Welch   See oncology Dr Curry  See urology Dr Perry  Schedule echo for heart   Please call [phone] to schedule your imaging appointment.   Can see dietician to discuss diet     Best,  Dr. Prieto

## 2024-05-28 NOTE — PROGRESS NOTES
FAMILY MEDICINE CLINIC NOTE    HPI  Mickey Ramos is a 68 year old male presenting for     #DM  -Hba1c: Indicated  -Microalbuminuria: Indicated  -B12: Consider future screen  -Pneumonia vaccine: Prevnar 23 8/2019  -HepB: Consider future screen  -Eye exam: sees Dr Welch - needs to go see  -Diabetic foot exam: 5/28/24 Bilateral barefoot skin diabetic exam is normal, visualized feet and the appearance is normal. Bilateral monofilament/sensation of both feet is normal. Pulsation pedal pulse exam of both lower legs/feet is normal as well.  -Current medications: Metformin 1000 mg daily. No longer on glipizide.   -Blood sugars:  -AM:      -Noon:   -PM:      -hypoglycemia:    #HTN---resolved  -not on any blood pressure meds  -blood pressures are normal today     #History of large B-Cell lymphoma  #Testicular lymphoma   -hematology Dr Curry    #Cataracts  -ophthalmology Dr Welch     #Prostate cancer  #BPH  #ED   -urology Dr Perry  -tamsulosin 0.4 mg nightly         ROS  GENERAL: No fever/chills, no recent weight loss  HEENT: No visual changes, no changes in hearing, no sore throats  NECK: No pain, no swelling  RESP: No cough, no SOB  CV: No chest pain, no palpitations  GI: No abd pain, no N/V/D  MSK: No edema  SKIN: No new rashes  NEURO: No numbness, no tingling, no headaches    HEALTH MAINTENANCE  Health Maintenance Topics with due status: Overdue       Topic Date Due    Zoster Vaccines Never done    COVID-19 Vaccine 09/01/2023    Diabetes Care: Microalb/Creat Ratio 09/21/2023    MA Annual Health Assessment Never done    Annual Depression Screening 01/01/2024    Fall Risk Screening (Annual) 01/01/2024    Diabetes Care A1C 02/29/2024    Colorectal Cancer Screening 04/20/2024    Diabetes Care: GFR 05/03/2024    Diabetes Care Foot Exam 05/04/2024       ALLERGIES  No Known Allergies    MEDICATIONS  Current Outpatient Medications   Medication Sig Dispense Refill    atorvastatin 10 MG Oral Tab Take 1 tablet (10 mg  total) by mouth nightly. 90 tablet 0    metFORMIN HCl 1000 MG Oral Tab Take 1 tablet (1,000 mg total) by mouth daily with breakfast. 90 tablet 1    tamsulosin 0.4 MG Oral Cap Take 2 capsules (0.8 mg total) by mouth daily. 180 capsule 1       ACTIVE PROBLEMS  Patient Active Problem List   Diagnosis    History of diffuse large B-cell lymphoma    Prostate cancer (HCC)    Controlled type 2 diabetes mellitus with complication, without long-term current use of insulin (HCC)    Overweight (BMI 25.0-29.9)    Former smoker    Age-related cataract of both eyes    Lymphoma of testis (HCC)    BPH (benign prostatic hyperplasia)    Health maintenance examination    Heart murmur       PAST MEDICAL HISTORY  Past Medical History:    Anesthesia complication    headache post lumbar puncture    BPH (benign prostatic hyperplasia)    Diabetes mellitus (HCC)    Diffuse large B-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 Uriostegui. on observation by Urology    Vitreous floaters       PAST SOCIAL HISTORY  Social History     Socioeconomic History    Marital status:      Spouse name: Not on file    Number of children: 4    Years of education: Not on file    Highest education level: Not on file   Occupational History    Occupation: works for shipping   Tobacco Use    Smoking status: Former     Current packs/day: 0.00     Average packs/day: 1 pack/day for 47.0 years (47.0 ttl pk-yrs)     Types: Cigarettes     Start date:      Quit date: 2018     Years since quittin.4    Smokeless tobacco: Never   Vaping Use    Vaping status: Never Used   Substance and Sexual Activity    Alcohol use: Yes     Comment: occasionally 1-2 drinks on weekends     Drug use: No    Sexual activity: Not Currently     Partners: Female   Other Topics Concern     Service Not Asked    Blood Transfusions Not Asked    Caffeine Concern Yes     Comment: Coffee    Occupational Exposure Not Asked    Hobby Hazards Not Asked    Sleep Concern Not Asked    Stress Concern Not Asked    Weight Concern Not Asked    Special Diet Not Asked    Back Care Not Asked    Exercise No    Bike Helmet Not Asked    Seat Belt Not Asked    Self-Exams Not Asked   Social History Narrative    Not on file     Social Determinants of Health     Financial Resource Strain: Not on file   Food Insecurity: Not on file   Transportation Needs: Not on file   Physical Activity: Not on file   Stress: Not on file   Social Connections: Not on file   Housing Stability: Not on file       PAST SURGICAL HISTORY  Past Surgical History:   Procedure Laterality Date    Colonoscopy  2008    [place]    Colonoscopy N/A 04/20/2019    Procedure: COLONOSCOPY;  Surgeon: Harjinder Silvestre MD;  Location: [facility] ENDOSCOPY    Inguinal hernia repair      Port, indwelling, imp      Removal testis,simple Right        PAST FAMILY HISTORY  Family History   Problem Relation Age of Onset    Heart Disease Mother     Heart Disease Father     Breast Cancer Sister 40    Diabetes Sister     Cancer Sister         Kidney    No Known Problems Sister     No Known Problems Sister     No Known Problems Sister     No Known Problems Brother     No Known Problems Maternal Grandmother     No Known Problems Maternal Grandfather     No Known Problems Paternal Grandmother     No Known Problems Paternal Grandfather     Colon Cancer Neg     Prostate Cancer Neg          PHYSICAL EXAM  Vitals:    05/28/24 1053   BP: 120/74   Pulse: 74   Temp: 98.4 °F (36.9 °C)   SpO2: 98%   Weight: 184 lb (83.5 kg)   Height: 5' 10.5\" (1.791 m)      Body mass index is 26.03 kg/m².    GENERAL: NAD  RESP: Non-labored respirations, CTAB, no wheezing, no rales, no ronchi  CV: RRR,  2/6 systolic murmur auscultated  MSK: No edema. Bilateral barefoot skin diabetic exam is normal, visualized feet and the appearance is normal. Bilateral monofilament/sensation of both feet is normal. Pulsation pedal pulse exam of both lower legs/feet is normal as well.  SKIN: Warm and dry, no rashes  NEURO: Answering questions appropriately    LABS  Lab Results   Component Value Date    WBC 5.8 05/03/2023    HGB 12.6 (L) 05/03/2023    HCT 37.6 (L) 05/03/2023    .0 05/03/2023    NEPERCENT 45.5 05/03/2023    LYPERCENT 45.0 05/03/2023    MOPERCENT 6.8 05/03/2023    EOPERCENT 1.6 05/03/2023    BAPERCENT 0.9 05/03/2023    NE 2.62 05/03/2023    LYMABS 2.59 05/03/2023    MOABSO 0.39 05/03/2023    EOABSO 0.09 05/03/2023    BAABSO 0.05 05/03/2023       Lab Results   Component Value Date     05/03/2023    K 3.7 05/03/2023     05/03/2023    CO2 26.0 05/03/2023    ANIONGAP 6 05/03/2023    BUN 19 (H) 05/03/2023    CREATSERUM 0.75 05/03/2023    BUNCREA 25.3 (H) 05/03/2023     (H) 05/03/2023    CA 9.0 05/03/2023    OSMOCALC 288 05/03/2023    GFRNAA 103 03/12/2022    GFRAA 119 03/12/2022    ALT 38 05/03/2023    AST 21 05/03/2023    ALKPHO 75 05/03/2023    BILT 0.4 05/03/2023    TP 7.9 05/03/2023    ALB 4.1 05/03/2023    GLOBULIN 3.8 05/03/2023    ELECTAG 1.1 05/03/2023    FASTING No 05/03/2023         Lab Results   Component Value Date    CHOLEST 145 03/12/2022    TRIG 93 03/12/2022    HDL 45 03/12/2022    LDL 82 03/12/2022    VLDL 15 03/12/2022    NONHDLC 100 03/12/2022        DIAGNOSTICS      ASSESSMENT/PLAN  Problem List Items Addressed This Visit          HCC Problems    Controlled type 2 diabetes mellitus with complication, without long-term current use of insulin (HCC) - Primary     Patient with a history of diabetes.  He reports that currently he is only on metformin 1000 mg daily  Check A1c, CMP, lipid panel.  Start atorvastatin 10 mg nightly.  Referral to ophthalmology provided.         Relevant  Medications    atorvastatin 10 MG Oral Tab    Other Relevant Orders    Ophthalmology Referral - In Network    DIETITIAN EDUCATION INITIAL, DIET (INTERNAL)    PCV20 VACCINE FOR INTRAMUSCULAR USE    Microalb/Creat Ratio, Random Urine    Lymphoma of testis (HCC)     Patient with a history of diffuse large B-cell lymphoma, lymphoma testis  Typically follows with hematology.  Referral to hematology provided.         Relevant Orders    Oncology/Hematology Referral - In Network    Prostate cancer (HCC)     Patient with a history of prostate cancer  Monitor PSA  Advise close follow-up with urology.         Relevant Orders    Urology Referral - In Network    PSA, TOTAL [5363][Q]       Cardiac and Vasculature    Heart murmur     Systolic murmur auscultated.  Check echo.         Relevant Orders    CARD ECHO 2D DOPPLER (CPT=93306)       Genitourinary and Reproductive    BPH (benign prostatic hyperplasia)     Currently on tamsulosin 0.4 mg nightly  Follows with urology            Hematology and Neoplasia    History of diffuse large B-cell lymphoma     Patient with a history of diffuse large B-cell lymphoma, lymphoma testis  Typically follows with hematology.  Referral to hematology provided.         Relevant Orders    Oncology/Hematology Referral - In Network       Eye    Age-related cataract of both eyes     Referral to ophthalmology provided.            Health Encounters    Health maintenance examination     Labs ordered.  Follow-up in 2 weeks for Medicare visit.         Relevant Orders    CBC With Differential With Platelet    Comp Metabolic Panel (14)    HCV Antibody    Hemoglobin A1C    Lipid Panel    TSH W Reflex To Free T4       Return in about 2 weeks (around 2024) for medicare visit.    No topic due editable text     Lavon Prieto MD  Family Medicine           Pre-chartin minutes  Reviewing/obtaining: 10 minutes  Medical Exam:1 minutes  Counseling/education: 5 minutes  Notes: 5  minutes  Referring/communicatin minutes  Care coordination: 0 minutes    My total time spent caring for the patient on the day of the encounter: 23 minutes

## 2024-05-28 NOTE — ASSESSMENT & PLAN NOTE
Patient with a history of diffuse large B-cell lymphoma, lymphoma testis  Typically follows with hematology.  Referral to hematology provided.

## 2024-05-29 DIAGNOSIS — E11.8 CONTROLLED TYPE 2 DIABETES MELLITUS WITH COMPLICATION, WITHOUT LONG-TERM CURRENT USE OF INSULIN (HCC): Primary | ICD-10-CM

## 2024-05-29 LAB
ABSOLUTE BASOPHILS: 41 CELLS/UL (ref 0–200)
ABSOLUTE EOSINOPHILS: 70 CELLS/UL (ref 15–500)
ABSOLUTE LYMPHOCYTES: 2233 CELLS/UL (ref 850–3900)
ABSOLUTE MONOCYTES: 354 CELLS/UL (ref 200–950)
ABSOLUTE NEUTROPHILS: 3103 CELLS/UL (ref 1500–7800)
ALBUMIN/GLOBULIN RATIO: 1.7 (CALC) (ref 1–2.5)
ALBUMIN: 4.5 G/DL (ref 3.6–5.1)
ALKALINE PHOSPHATASE: 83 U/L (ref 35–144)
ALT: 22 U/L (ref 9–46)
AST: 17 U/L (ref 10–35)
BASOPHILS: 0.7 %
BILIRUBIN, TOTAL: 0.5 MG/DL (ref 0.2–1.2)
BUN: 18 MG/DL (ref 7–25)
CALCIUM: 9.5 MG/DL (ref 8.6–10.3)
CARBON DIOXIDE: 27 MMOL/L (ref 20–32)
CHLORIDE: 100 MMOL/L (ref 98–110)
CHOL/HDLC RATIO: 3.5 (CALC)
CHOLESTEROL, TOTAL: 162 MG/DL
CREATININE, RANDOM URINE: 52 MG/DL (ref 20–320)
CREATININE: 0.72 MG/DL (ref 0.7–1.35)
EGFR: 101 ML/MIN/1.73M2
EOSINOPHILS: 1.2 %
GLOBULIN: 2.7 G/DL (CALC) (ref 1.9–3.7)
GLUCOSE: 296 MG/DL (ref 65–99)
HDL CHOLESTEROL: 46 MG/DL
HEMATOCRIT: 41 % (ref 38.5–50)
HEMOGLOBIN A1C: 11.3 % OF TOTAL HGB
HEMOGLOBIN: 13.8 G/DL (ref 13.2–17.1)
LDL-CHOLESTEROL: 85 MG/DL (CALC)
LYMPHOCYTES: 38.5 %
MCH: 32.9 PG (ref 27–33)
MCHC: 33.7 G/DL (ref 32–36)
MCV: 97.9 FL (ref 80–100)
MICROALBUMIN/CREATININE RATIO, RANDOM URINE: 13 MG/G CREAT
MICROALBUMIN: 0.7 MG/DL
MONOCYTES: 6.1 %
MPV: 12.5 FL (ref 7.5–12.5)
NEUTROPHILS: 53.5 %
NON-HDL CHOLESTEROL: 116 MG/DL (CALC)
PLATELET COUNT: 197 THOUSAND/UL (ref 140–400)
POTASSIUM: 4.5 MMOL/L (ref 3.5–5.3)
PROTEIN, TOTAL: 7.2 G/DL (ref 6.1–8.1)
PSA, TOTAL: 1.4 NG/ML
RDW: 12.6 % (ref 11–15)
RED BLOOD CELL COUNT: 4.19 MILLION/UL (ref 4.2–5.8)
SODIUM: 134 MMOL/L (ref 135–146)
TRIGLYCERIDES: 211 MG/DL
TSH W/REFLEX TO FT4: 1.07 MIU/L (ref 0.4–4.5)
WHITE BLOOD CELL COUNT: 5.8 THOUSAND/UL (ref 3.8–10.8)

## 2024-05-29 RX ORDER — LANCETS
1 EACH MISCELLANEOUS 3 TIMES DAILY
Qty: 100 EACH | Refills: 0 | Status: SHIPPED | OUTPATIENT
Start: 2024-05-29 | End: 2025-05-29

## 2024-05-29 RX ORDER — BLOOD-GLUCOSE METER
1 EACH MISCELLANEOUS DAILY
Qty: 1 KIT | Refills: 0 | Status: SHIPPED | OUTPATIENT
Start: 2024-05-29

## 2024-05-29 RX ORDER — BLOOD SUGAR DIAGNOSTIC
STRIP MISCELLANEOUS
Qty: 100 STRIP | Refills: 3 | Status: SHIPPED | OUTPATIENT
Start: 2024-05-29

## 2024-05-29 RX ORDER — GLIPIZIDE 5 MG/1
5 TABLET, FILM COATED, EXTENDED RELEASE ORAL DAILY
Qty: 90 TABLET | Refills: 0 | Status: SHIPPED | OUTPATIENT
Start: 2024-05-29

## 2024-05-29 NOTE — PROGRESS NOTES
Discussed poorly controlled diabetes.  Increase metformin to 1000 mg twice daily.  Start glipizide ER 5 mg daily.  Blood sugar device sent to pharmacy.  Advised checking blood sugars regularly and bring in log to next office visit with me.

## 2024-05-30 ENCOUNTER — OFFICE VISIT (OUTPATIENT)
Dept: SURGERY | Facility: CLINIC | Age: 69
End: 2024-05-30

## 2024-05-30 DIAGNOSIS — N52.9 ERECTILE DYSFUNCTION, UNSPECIFIED ERECTILE DYSFUNCTION TYPE: ICD-10-CM

## 2024-05-30 DIAGNOSIS — N13.8 BPH WITH OBSTRUCTION/LOWER URINARY TRACT SYMPTOMS: ICD-10-CM

## 2024-05-30 DIAGNOSIS — N40.1 BPH WITH OBSTRUCTION/LOWER URINARY TRACT SYMPTOMS: ICD-10-CM

## 2024-05-30 DIAGNOSIS — C61 PROSTATE CANCER (HCC): Primary | ICD-10-CM

## 2024-05-30 PROCEDURE — 99214 OFFICE O/P EST MOD 30 MIN: CPT | Performed by: UROLOGY

## 2024-05-30 NOTE — PROGRESS NOTES
[facility]  Follow-Up Visit    HPI: Mickey Ramos is a 68 year old male presents for a follow up visit. Patient was last seen on 8/28/23.    INTERVAL HISTORY: Following up regarding low risk prostate cancer on active surveillance, BPH with LUTS, ED.    Former [facility] patient.    PSA level 5/2024 stable at 1.4 ng/mL.    Prostate MRI 8/2023 revealing a 37 cc gland, PI-RADS 2 study without any suspicious dominant lesions.    On tamsulosin 0.4 mg daily for BPH with LUTS.  Reports worsening symptoms over the past year or so.  His IPSS is 25 (4/4/3/4/3/3/4) with a quality-of-life score of 5.      He reports sensation of incomplete bladder emptying, nocturia x 4, intermittency, Jorge, urgency, and weak stream.  No gross hematuria or dysuria    Bladder scan reveals a PVR of 6 mL.    Erectile dysfunction, on tadalafil as needed with acceptable results.  No significant side effects.      1. Low risk prostate cancer  2. BPH with LUTS  3. ED  Initially diagnosed in 2013 with low volume [place] 6 prostate cancer by [facility].    Has been on active surveillance.    Follow-up surveillance prostate biopsy 1/2019 by Dr. PAREKH revealing grade group 1 prostate cancer in 2/14 cores up to 15% involvement.    Prostate MRI 8/2021 assigned PI-RADS 2 without any definite suspicious lesions of the prostate.  Prostate measured 42 cc.    His PSA levels have been stable.    PSA 5/3/2023 was 2.93 ng/mL.    Also with erectile dysfunction, usually responsive to tadalafil as needed.    History of right inguinal orchiectomy, pathology revealing lymphoma.    BPH with LUTS, on tamsulosin 0.4 mg daily.  His IPSS is 12 (1/1/2/1/2/2/3) with a quality-of-life score of 3.  He awakens about 3 times at night to urinate.    Bladder scan PVR 2 mL.    - 5/2024 F/U:  - PSA stable.  MRI 8/2023 PI-RADS 2.  DOLLY without nodules.  Continue active surveillance.    - On Flomax 0.4 mg daily for BPH.  Worsening LUTS.  IPSS 25 (4/4/3/4/3/3/4) with a QoL score of 5.   PVR 6 mL.  Interested in MIST's.    - On tadalafil as needed for ED with acceptable results.      Reviewed past medical, surgical, family, and social history.  Reviewed med list and allergies.      REVIEW OF SYSTEMS:  Pertinent positives and negatives per HPI. A 10-point ROS was performed and is otherwise negative.       EXAM:  There were no vitals taken for this visit.    Physical Exam  Constitutional:       General: He is not in acute distress.     Appearance: He is well-developed.   HENT:      Head: Normocephalic.   Eyes:      General: No scleral icterus.  Cardiovascular:      Rate and Rhythm: Normal rate.   Pulmonary:      Effort: Pulmonary effort is normal.   Abdominal:      General: There is no distension.      Palpations: Abdomen is soft.      Tenderness: There is no abdominal tenderness.   Genitourinary:     Comments: Uncircumcised male phallus.  DOLLY revealing a 2+ enlarged prostate, smooth, nontender, nonnodular and symmetrical.  Skin:     General: Skin is warm and dry.   Neurological:      Mental Status: He is alert and oriented to person, place, and time.   Psychiatric:         Mood and Affect: Mood normal.         Behavior: Behavior normal.       PATHOLOGY:  See HPI for details.      LABS:     PSA   Latest Ref Rng <=4.00 ng/mL   8/16/2016 5.6 (H)    6/24/2017 3.0    7/28/2018 4.2 (H)    10/27/2018 3.9    8/24/2019 4.24 (H)    11/10/2019 4.50 (H)    4/21/2020 4.09 (H)    9/11/2020 3.89    8/2/2021 3.22    5/3/2023 2.93    5/28/2024 1.40        IMAGING:    MP MRI prostate (8/2023):   1. Multiple BPH nodules within the transitional zones bilaterally.   2. No peripheral zone lesions identified.   3. PI-RADS category 2 study.       UROLOGY PROCEDURE:  Not performed today.      IMPRESSION:  68 year old  male with low risk prostate cancer diagnosed in 2015, on active surveillance.  Erectile dysfunction and BPH with LUTS.    Regarding his prostate cancer, PSA levels remain stable.  DOLLY unremarkable.   Given his very low risk prostate cancer, active surveillance remains an option.  Components of active surveillance were discussed.  Definitive local treatment options were discussed including radiation therapy and surgical treatment options.    Patient agrees to continued active surveillance.    Regarding his ED: Responsive to 5 PDE inhibitors as needed.  He may continue current management.    Regarding his BPH with LUTS: On tamsulosin daily with moderate LUTS.  Worsening bothersome LUTS over the past year.  Management options discussed including continuing current management, maximizing medical therapy or considering minimally invasive surgical treatments.  Benefits, risk, side effects and alternatives of treatment were discussed.      Patient is interested in considering minimally invasive surgical treatment options for BPH.  I recommend scheduling uroflowmetry, cystoscopy, and prostate TRUS in the office for further evaluation and to establish his candidacy for surgical treatment options for BPH.     Patient agreeable. All questions answered.      PLAN:  1.  Continue active surveillance of low risk prostate cancer.  Follow-up in 6 months with a PSA.    2.  Continue tadalafil as needed for management of erectile dysfunction.  Prescribed by PCP.    3.  Continue tamsulosin 0.4 mg daily for management of BPH with LUTS.    4.  Schedule for cystoscopy, uroflowmetry, and prostate TRUS for further evaluation of BPH with worsening lower urinary tract symptoms, on medical therapy.      Oswaldo Perry MD  5/30/2024

## 2024-06-05 ENCOUNTER — PROCEDURE (OUTPATIENT)
Dept: SURGERY | Facility: CLINIC | Age: 69
End: 2024-06-05
Payer: MEDICARE

## 2024-06-05 ENCOUNTER — LAB ENCOUNTER (OUTPATIENT)
Dept: LAB | Facility: HOSPITAL | Age: 69
End: 2024-06-05
Attending: UROLOGY
Payer: MEDICARE

## 2024-06-05 VITALS — DIASTOLIC BLOOD PRESSURE: 70 MMHG | SYSTOLIC BLOOD PRESSURE: 118 MMHG

## 2024-06-05 DIAGNOSIS — N13.8 BPH WITH OBSTRUCTION/LOWER URINARY TRACT SYMPTOMS: Primary | ICD-10-CM

## 2024-06-05 DIAGNOSIS — C61 PROSTATE CANCER (HCC): ICD-10-CM

## 2024-06-05 DIAGNOSIS — Z79.01 MONITORING FOR ANTICOAGULANT USE: ICD-10-CM

## 2024-06-05 DIAGNOSIS — N40.1 BPH WITH OBSTRUCTION/LOWER URINARY TRACT SYMPTOMS: Primary | ICD-10-CM

## 2024-06-05 DIAGNOSIS — R39.89 OTHER SYMPTOMS AND SIGNS INVOLVING THE GENITOURINARY SYSTEM: ICD-10-CM

## 2024-06-05 DIAGNOSIS — Z51.81 MONITORING FOR ANTICOAGULANT USE: ICD-10-CM

## 2024-06-05 LAB
APTT PPP: 26.8 SECONDS (ref 23–36)
BILIRUB UR QL: NEGATIVE
CLARITY UR: CLEAR
COLOR UR: COLORLESS
GLUCOSE UR-MCNC: NORMAL MG/DL
HGB UR QL STRIP.AUTO: NEGATIVE
INR BLD: 1.02 (ref 0.8–1.2)
KETONES UR-MCNC: NEGATIVE MG/DL
LEUKOCYTE ESTERASE UR QL STRIP.AUTO: NEGATIVE
NITRITE UR QL STRIP.AUTO: NEGATIVE
PH UR: 5.5 [PH] (ref 5–8)
PROT UR-MCNC: NEGATIVE MG/DL
PROTHROMBIN TIME: 14 SECONDS (ref 11.6–14.8)
SP GR UR STRIP: <1.005 (ref 1–1.03)
UROBILINOGEN UR STRIP-ACNC: NORMAL

## 2024-06-05 PROCEDURE — 36415 COLL VENOUS BLD VENIPUNCTURE: CPT | Performed by: UROLOGY

## 2024-06-05 PROCEDURE — 3078F DIAST BP <80 MM HG: CPT | Performed by: UROLOGY

## 2024-06-05 PROCEDURE — 3074F SYST BP LT 130 MM HG: CPT | Performed by: UROLOGY

## 2024-06-05 PROCEDURE — 87086 URINE CULTURE/COLONY COUNT: CPT | Performed by: UROLOGY

## 2024-06-05 PROCEDURE — 52000 CYSTOURETHROSCOPY: CPT | Performed by: UROLOGY

## 2024-06-05 PROCEDURE — 85730 THROMBOPLASTIN TIME PARTIAL: CPT | Performed by: UROLOGY

## 2024-06-05 PROCEDURE — 51741 ELECTRO-UROFLOWMETRY FIRST: CPT | Performed by: UROLOGY

## 2024-06-05 PROCEDURE — 1159F MED LIST DOCD IN RCRD: CPT | Performed by: UROLOGY

## 2024-06-05 PROCEDURE — 81003 URINALYSIS AUTO W/O SCOPE: CPT | Performed by: UROLOGY

## 2024-06-05 PROCEDURE — 85610 PROTHROMBIN TIME: CPT | Performed by: UROLOGY

## 2024-06-05 PROCEDURE — 76872 US TRANSRECTAL: CPT | Performed by: UROLOGY

## 2024-06-05 PROCEDURE — 99214 OFFICE O/P EST MOD 30 MIN: CPT | Performed by: UROLOGY

## 2024-06-05 RX ORDER — CIPROFLOXACIN 500 MG/1
500 TABLET, FILM COATED ORAL ONCE
Status: COMPLETED | OUTPATIENT
Start: 2024-06-05 | End: 2024-06-05

## 2024-06-05 RX ADMIN — CIPROFLOXACIN 500 MG: 500 TABLET, FILM COATED ORAL at 14:01:00

## 2024-06-05 NOTE — H&P
[facility] [place] [facility]  Follow-Up Visit    HPI: Mickey Ramos is a 68 year old male presents for a follow up visit. Patient was last seen on 5/30/24.    INTERVAL HISTORY: Following up regarding low risk prostate cancer on active surveillance, BPH with LUTS, ED.    Former [facility] patient.    PSA level 5/2024 stable at 1.4 ng/mL.    Prostate MRI 8/2023 revealing a 37 cc gland, PI-RADS 2 study without any suspicious dominant lesions.    On tamsulosin 0.4 mg daily for BPH with LUTS.  Reports worsening symptoms over the past year or so.  His IPSS is 25 (4/4/3/4/3/3/4) with a quality-of-life score of 5.      He reports sensation of incomplete bladder emptying, nocturia x 4, intermittency, frequency, urgency, and weak stream.  No gross hematuria or dysuria    He presents for cystoscopy, uroflowmetry, prostate TRUS for further evaluation of his worsening LUTS and consideration for minimally invasive surgical treatment options for BPH.    Erectile dysfunction, on tadalafil as needed with acceptable results.  No significant side effects.      1. Low risk prostate cancer  2. BPH with LUTS  3. ED  Initially diagnosed in 2013 with low volume Crescencio 6 prostate cancer by [facility].    Has been on active surveillance.    Follow-up surveillance prostate biopsy 1/2019 by Dr. PAREKH revealing grade group 1 prostate cancer in 2/14 cores up to 15% involvement.    Prostate MRI 8/2021 assigned PI-RADS 2 without any definite suspicious lesions of the prostate.  Prostate measured 42 cc.    His PSA levels have been stable.    PSA 5/3/2023 was 2.93 ng/mL.    Also with erectile dysfunction, usually responsive to tadalafil as needed.    History of right inguinal orchiectomy, pathology revealing lymphoma.    BPH with LUTS, on tamsulosin 0.4 mg daily.  His IPSS is 12 (1/1/2/1/2/2/3) with a quality-of-life score of 3.  He awakens about 3 times at night to urinate.    Bladder scan PVR 2 mL.    - 5/2024 F/U:  - PSA stable.  MRI 8/2023 PI-RADS 2.  DOLLY without  nodules.  Continue active surveillance.    - On Flomax 0.4 mg daily for BPH.  Worsening LUTS.  IPSS 25 (4/4/3/4/3/3/4) with a QoL score of 5.  PVR 6 mL.  Interested in MIST's.    - On tadalafil as needed for ED with acceptable results.    - 6/5/24 F/U:   - PSA stable.  MRI 8/2023 PI-RADS 2.  Continue active surveillance.    - Uroflow + PVR: Voided 360 mL, Qmax 15.3 mL/s, Qave 7.3 mL/s, flow time 49.5 sec, voiding time 57.1 sec, voiding curve shape prolonged and flattened with low peak,  mL.      - Prostate volume on TRUS: 37.5 cc.    - Cystoscopy revealing mild to moderate prostate enlargement with kissing lateral lobes and evidence of bladder outlet obstruction.  No significant median lobe enlargement.      Reviewed past medical, surgical, family, and social history.  Reviewed med list and allergies.      REVIEW OF SYSTEMS:  Pertinent positives and negatives per HPI. A 10-point ROS was performed and is otherwise negative.       EXAM:  There were no vitals taken for this visit.    Physical Exam  Constitutional:       General: He is not in acute distress.     Appearance: He is well-developed.   HENT:      Head: Normocephalic.   Eyes:      General: No scleral icterus.  Cardiovascular:      Rate and Rhythm: Normal rate.   Pulmonary:      Effort: Pulmonary effort is normal.   Abdominal:      General: There is no distension.      Palpations: Abdomen is soft.      Tenderness: There is no abdominal tenderness.   Genitourinary:     Comments: DOLLY performed 5/30/2024 revealing a 2+ enlarged prostate, smooth, nontender, nonnodular and symmetrical.  Skin:     General: Skin is warm and dry.   Neurological:      Mental Status: He is alert and oriented to person, place, and time.   Psychiatric:         Mood and Affect: Mood normal.         Behavior: Behavior normal.       PATHOLOGY:  See HPI for details.      LABS:     PSA   Latest Ref Rng <=4.00 ng/mL   8/16/2016 5.6 (H)    6/24/2017 3.0    7/28/2018 4.2 (H)    10/27/2018  3.9    8/24/2019 4.24 (H)    11/10/2019 4.50 (H)    4/21/2020 4.09 (H)    9/11/2020 3.89    8/2/2021 3.22    5/3/2023 2.93    5/28/2024 1.40        IMAGING:    MP MRI prostate (8/2023):   1. Multiple BPH nodules within the transitional zones bilaterally.   2. No peripheral zone lesions identified.   3. PI-RADS category 2 study.       UROLOGY PROCEDURE:    TRANSRECTAL ULTRASOUND OF THE PROSTATE  Transrectal ultrasonography of the prostate and seminal vesicles was performed in the transverse ad sagittal planes using a [employer] ultrasound System, and a 8 MHZ endorectal probe. Representative pictures were taken of the prostate gland and these were preserved on hard copy. Pathology was noted. Prostate volume was determined by imaging the prostate in the transverse and sagittal planes and determining maximum height, width and length dimensions.      FINDING:      Seminal vesicles: WNL   Hypoechoic prostate lesions suspicious for malignancy: None.   Presence of intravesical median lobe: Not clinically significant  Prostate volume on ultrasound: 37.5 cc (W 4.71 cm x H 3.3 cm x L 4.61 cm)        CYSTOURETHROSCOPY  Informed consent was obtained for the procedure. The site was prepped and drape in the usual sterile fashion.  A 16 [employer] flexible cystoscope was utilized.     Anesthesia:  2% lidocaine gel     Urethra: No strictures, stones, or lesions     Prostate / Pelvic: Mild to moderate prostate enlargement with kissing lateral lobes and evidence of lateral obstruction.  No significant median lobe enlargement.     Bladder: No stones, tumors, masses, or lesions.     U.O's: Normal     Trabeculation: +1-2     POST CYSTOSCOPY MEDICATIONS: sample one tablet Cipro 500 mg given to patient     DIAGNOSIS: Cystoscopy revealing mild to moderate BPH with evidence of bladder outlet obstruction      IMPRESSION:  68 year old  male with low risk prostate cancer diagnosed in 2015, on active surveillance.  Erectile dysfunction and  BPH with LUTS.    Regarding his prostate cancer, PSA levels remain stable.  DOLLY unremarkable.  Given his very low risk prostate cancer, active surveillance remains an option.  Components of active surveillance were discussed.  Definitive local treatment options were discussed including radiation therapy and surgical treatment options.    Patient agrees to continued active surveillance.    Regarding his ED: Responsive to 5 PDE inhibitors as needed.  He may continue current management.    Regarding his BPH with LUTS: On tamsulosin daily with moderate LUTS.  Worsening bothersome LUTS over the past year despite medical therapy.      Further evaluation today included cystoscopy, uroflowmetry, prostate TRUS.  This revealed enlargement of the prostate with evidence of bladder outlet obstruction and obstructive/decreased uroflow.    Following the above procedures I had a separate, clinically significant and clearly identifiable discussion with the patient regarding those results and further management of his BPH.    Management options discussed including continuing current management, maximizing medical therapy or considering minimally invasive surgical treatments.    I informed him that given his prostate shape and size, he is a candidate for UroLift.  The procedure was described including rationale and approach, benefits, risks, side effects and alternatives of treatment.    We discussed the expected postoperative course of recovery.  We discussed risks including but not limited to medical and anesthetic complications, bleeding, infection, damage to surrounding organs or structures, need for additional procedures, catheterization rates, retreatment rates, as well as possible failure of the procedure to alleviate his symptoms.    Patient verbalized understanding.  He would like to proceed with UroLift.    All questions answered.      PLAN:  1.  Continue active surveillance of low risk prostate cancer.  Follow-up in 6  months with a PSA.    2.  Continue tadalafil as needed for management of erectile dysfunction.  Prescribed by PCP.    3.  Continue tamsulosin 0.4 mg daily for management of BPH with LUTS.    4.  Schedule for cystoscopy and UroLift.      Oswaldo Perry MD  6/5/2024

## 2024-06-05 NOTE — PROGRESS NOTES
Patient seen in office, scheduled Cystoscopy, Urolift, Friday 06/14/2024, went over pre-op instructions, labs being done today.

## 2024-06-05 NOTE — H&P (VIEW-ONLY)
[facility]rology  Follow-Up Visit    HPI: Mickey Ramos is a 68 year old male presents for a follow up visit. Patient was last seen on 5/30/24.    INTERVAL HISTORY: Following up regarding low risk prostate cancer on active surveillance, BPH with LUTS, ED.    Former [facility] patient.    PSA level 5/2024 stable at 1.4 ng/mL.    Prostate MRI 8/2023 revealing a 37 cc gland, PI-RADS 2 study without any suspicious dominant lesions.    On tamsulosin 0.4 mg daily for BPH with LUTS.  Reports worsening symptoms over the past year or so.  His IPSS is 25 (4/4/3/4/3/3/4) with a quality-of-life score of 5.      He reports sensation of incomplete bladder emptying, nocturia x 4, intermittency, frequency, urgency, and weak stream.  No gross hematuria or dysuria    He presents for cystoscopy, uroflowmetry, prostate TRUS for further evaluation of his worsening LUTS and consideration for minimally invasive surgical treatment options for BPH.    Erectile dysfunction, on tadalafil as needed with acceptable results.  No significant side effects.      1. Low risk prostate cancer  2. BPH with LUTS  3. ED  Initially diagnosed in 2013 with low volume Crescencio 6 prostate cancer by [facility].    Has been on active surveillance.    Follow-up surveillance prostate biopsy 1/2019 by Dr. PAREKH revealing grade group 1 prostate cancer in 2/14 cores up to 15% involvement.    Prostate MRI 8/2021 assigned PI-RADS 2 without any definite suspicious lesions of the prostate.  Prostate measured 42 cc.    His PSA levels have been stable.    PSA 5/3/2023 was 2.93 ng/mL.    Also with erectile dysfunction, usually responsive to tadalafil as needed.    History of right inguinal orchiectomy, pathology revealing lymphoma.    BPH with LUTS, on tamsulosin 0.4 mg daily.  His IPSS is 12 (1/1/2/1/2/2/3) with a quality-of-life score of 3.  He awakens about 3 times at night to urinate.    Bladder scan PVR 2 mL.    - 5/2024 F/U:  - PSA stable.  MRI 8/2023 PI-RADS 2.  DOLLY without  nodules.  Continue active surveillance.    - On Flomax 0.4 mg daily for BPH.  Worsening LUTS.  IPSS 25 (4/4/3/4/3/3/4) with a QoL score of 5.  PVR 6 mL.  Interested in MIST's.    - On tadalafil as needed for ED with acceptable results.    - 6/5/24 F/U:   - PSA stable.  MRI 8/2023 PI-RADS 2.  Continue active surveillance.    - Uroflow + PVR: Voided 360 mL, Qmax 15.3 mL/s, Qave 7.3 mL/s, flow time 49.5 sec, voiding time 57.1 sec, voiding curve shape prolonged and flattened with low peak,  mL.      - Prostate volume on TRUS: 37.5 cc.    - Cystoscopy revealing mild to moderate prostate enlargement with kissing lateral lobes and evidence of bladder outlet obstruction.  No significant median lobe enlargement.      Reviewed past medical, surgical, family, and social history.  Reviewed med list and allergies.      REVIEW OF SYSTEMS:  Pertinent positives and negatives per HPI. A 10-point ROS was performed and is otherwise negative.       EXAM:  There were no vitals taken for this visit.    Physical Exam  Constitutional:       General: He is not in acute distress.     Appearance: He is well-developed.   HENT:      Head: Normocephalic.   Eyes:      General: No scleral icterus.  Cardiovascular:      Rate and Rhythm: Normal rate.   Pulmonary:      Effort: Pulmonary effort is normal.   Abdominal:      General: There is no distension.      Palpations: Abdomen is soft.      Tenderness: There is no abdominal tenderness.   Genitourinary:     Comments: DOLLY performed 5/30/2024 revealing a 2+ enlarged prostate, smooth, nontender, nonnodular and symmetrical.  Skin:     General: Skin is warm and dry.   Neurological:      Mental Status: He is alert and oriented to person, place, and time.   Psychiatric:         Mood and Affect: Mood normal.         Behavior: Behavior normal.       PATHOLOGY:  See HPI for details.      LABS:     PSA   Latest Ref Rng <=4.00 ng/mL   8/16/2016 5.6 (H)    6/24/2017 3.0    7/28/2018 4.2 (H)    10/27/2018  3.9    8/24/2019 4.24 (H)    11/10/2019 4.50 (H)    4/21/2020 4.09 (H)    9/11/2020 3.89    8/2/2021 3.22    5/3/2023 2.93    5/28/2024 1.40        IMAGING:    MP MRI prostate (8/2023):   1. Multiple BPH nodules within the transitional zones bilaterally.   2. No peripheral zone lesions identified.   3. PI-RADS category 2 study.       UROLOGY PROCEDURE:    TRANSRECTAL ULTRASOUND OF THE PROSTATE  Transrectal ultrasonography of the prostate and seminal vesicles was performed in the transverse ad sagittal planes using a [employer] ultrasound System, and a 8 MHZ endorectal probe. Representative pictures were taken of the prostate gland and these were preserved on hard copy. Pathology was noted. Prostate volume was determined by imaging the prostate in the transverse and sagittal planes and determining maximum height, width and length dimensions.      FINDING:      Seminal vesicles: WNL   Hypoechoic prostate lesions suspicious for malignancy: None.   Presence of intravesical median lobe: Not clinically significant  Prostate volume on ultrasound: 37.5 cc (W 4.71 cm x H 3.3 cm x L 4.61 cm)        CYSTOURETHROSCOPY  Informed consent was obtained for the procedure. The site was prepped and drape in the usual sterile fashion.  A 16 [place] Ambu flexible cystoscope was utilized.     Anesthesia:  2% lidocaine gel     Urethra: No strictures, stones, or lesions     Prostate / Pelvic: Mild to moderate prostate enlargement with kissing lateral lobes and evidence of lateral obstruction.  No significant median lobe enlargement.     Bladder: No stones, tumors, masses, or lesions.     U.O's: Normal     Trabeculation: +1-2     POST CYSTOSCOPY MEDICATIONS: sample one tablet Cipro 500 mg given to patient     DIAGNOSIS: Cystoscopy revealing mild to moderate BPH with evidence of bladder outlet obstruction      IMPRESSION:  68 year old  male with low risk prostate cancer diagnosed in 2015, on active surveillance.  Erectile dysfunction and  BPH with LUTS.    Regarding his prostate cancer, PSA levels remain stable.  DOLLY unremarkable.  Given his very low risk prostate cancer, active surveillance remains an option.  Components of active surveillance were discussed.  Definitive local treatment options were discussed including radiation therapy and surgical treatment options.    Patient agrees to continued active surveillance.    Regarding his ED: Responsive to 5 PDE inhibitors as needed.  He may continue current management.    Regarding his BPH with LUTS: On tamsulosin daily with moderate LUTS.  Worsening bothersome LUTS over the past year despite medical therapy.      Further evaluation today included cystoscopy, uroflowmetry, prostate TRUS.  This revealed enlargement of the prostate with evidence of bladder outlet obstruction and obstructive/decreased uroflow.    Following the above procedures I had a separate, clinically significant and clearly identifiable discussion with the patient regarding those results and further management of his BPH.    Management options discussed including continuing current management, maximizing medical therapy or considering minimally invasive surgical treatments.    I informed him that given his prostate shape and size, he is a candidate for UroLift.  The procedure was described including rationale and approach, benefits, risks, side effects and alternatives of treatment.    We discussed the expected postoperative course of recovery.  We discussed risks including but not limited to medical and anesthetic complications, bleeding, infection, damage to surrounding organs or structures, need for additional procedures, catheterization rates, retreatment rates, as well as possible failure of the procedure to alleviate his symptoms.    Patient verbalized understanding.  He would like to proceed with UroLift.    All questions answered.      PLAN:  1.  Continue active surveillance of low risk prostate cancer.  Follow-up in 6  months with a PSA.    2.  Continue tadalafil as needed for management of erectile dysfunction.  Prescribed by PCP.    3.  Continue tamsulosin 0.4 mg daily for management of BPH with LUTS.    4.  Schedule for cystoscopy and UroLift.      Oswaldo Perry MD  6/5/2024

## 2024-06-10 NOTE — PROGRESS NOTES
FAMILY MEDICINE CLINIC NOTE - MEDICARE    HPI  Mickey Ramos is a 68 year old male presenting for a Medicare Subsequent Annual Wellness visit (Pt already had Initial Annual Wellness).    #Health Maintenance  -Diet: Not eating very healthy.   -Exercise: Exercising regularly - running and lifting weights   -Lung cancer screen: Indicated  -Colon cancer screen: Indicated - 4/2019 Dr Silvestre, repeat in 5 years   -Prostate cancer screen: - History of prostate cancer, last PSA 5/29/24  -Aortic aneurysm screen: Indicated  -Statin:  - 5/29/24  -ASA: Not indicated  -HIV screen: Not indicated  -Hep C screen: - 5/29/24  -Gonorrhea/chlamydia:  Not indicated  -Syphillis: Not indicated  -TB: Not indicated  -Tobacco/alcohol: Per below      #Immunizations  -Tdap:  Medicare  -Flu shot: Not indicated - not season  -PCV13: Not indicated   -PCV20:  5/28/24    -PPSV23:  8/28/19    -RZV (preferred) or VZL: Indicated   -RSV: Indicated   -COVID: Indicated    #DM  -Hba1c: 11.3 5/29/24   -Microalbuminuria: 5/29/24  -B12: Consider future screen  -Pneumonia vaccine: Prevnar 23 8/2019, Prevnar 20 5/28/24  -HepB: Consider future screen  -Eye exam: sees Dr Welch - needs to go see  -Diabetic foot exam: 5/28/24 Bilateral barefoot skin diabetic exam is normal, visualized feet and the appearance is normal. Bilateral monofilament/sensation of both feet is normal. Pulsation pedal pulse exam of both lower legs/feet is normal as well.  -Current medications: Metformin 1000 mg daily -> recently increased to twice daily. Restarted glipizide ER 5 mg daily -> will increase to 10.   -Statin: Started on atorvastatin 10 mg nightly   -Blood sugars:  -AM:    reviewed log - 120-180s  -Noon:   -PM:      -hypoglycemia:       #History of large B-Cell lymphoma  #Testicular lymphoma   -hematology Dr Curry     #Cataracts  -ophthalmology Dr Welch      #Prostate cancer  #BPH  #ED   -urology Dr Perry  -tamsulosin 0.4 mg nightly   -has plans for cystoscopy, urolift            #Additional screenings  History/Other:   Fall Risk Assessment:   He has been screened for Falls and is low risk.      Cognitive Assessment:   He had a completely normal cognitive assessment - see flowsheet entries       Functional Ability/Status:   Mickey Ramos has a completely normal functional assessment. See flowsheet for details.      Depression Screening (PHQ-2/PHQ-9): PHQ-2 SCORE: 0  , done 6/11/2024        Advanced Directives:   He does NOT have a Living Will. [Do you have a living will?: No]  He does NOT have a Power of  for Health Care. [Do you have a healthcare power of ?: No]  Discussed Advance Care Planning with patient (and family/surrogate if present). Standard forms made available to patient in After Visit Summary.    #Patient Care Team  Patient Care Team:  Lavon Prieto MD as PCP - General (Family Medicine)  Seb Massey MD as Referring Physician (Family Practice)  Kalpesh Pittman MD (Radiation Oncology)  Bryant Tijerina MD (Radiation Oncology)  Johnny Montiel MD as Consulting Physician (Anesthesiology)  Won Linares MD as Consulting Physician (NEUROLOGY)  Akin Curry MD as Referring Physician (Hematology and Oncology)  Oswaldo Perry MD (UROLOGY)      ROS  GENERAL: No fever/chills, no recent weight loss   HEENT: No visual changes, no changes in hearing, no sore throats  NECK: No pain, no swelling  RESP: No cough, no SOB  CV: No chest pain, no palpitations  GI: No abd pain, no N/V/D  MSK: No edema, no pain  SKIN: No new rashes  NEURO: No numbness, no tingling, no HA    HEALTH MAINTENANCE CHECKLIST  Health Maintenance Topics with due status: Overdue       Topic Date Due    Zoster Vaccines Never done    Lung Cancer Screening Never done    COVID-19 Vaccine 09/01/2023    MA Annual Health Assessment Never done    Annual Depression Screening 01/01/2024    Fall Risk Screening (Annual) 01/01/2024    Colorectal Cancer Screening 04/20/2024        ALLERGIES  No Known Allergies    MEDICATIONS  Current Outpatient Medications   Medication Sig Dispense Refill    glipiZIDE ER 10 MG Oral Tablet 24 Hr Take 1 tablet (10 mg total) by mouth daily. 90 tablet 0    metFORMIN HCl 1000 MG Oral Tab Take 1 tablet (1,000 mg total) by mouth 2 (two) times daily with meals. 180 tablet 1    Blood Glucose Monitoring Suppl (ACCU-CHEK GUIDE ME) w/Device Does not apply Kit 1 each daily. Check blood sugars once as prescribed. 1 kit 0    Glucose Blood (ACCU-CHEK GUIDE) In Vitro Strip Check blood sugars 3 times daily 100 strip 3    Accu-Chek Softclix Lancets Does not apply Misc 1 Lancet by Finger stick route in the morning, at noon, and at bedtime. Use as directed. 100 each 0    atorvastatin 10 MG Oral Tab Take 1 tablet (10 mg total) by mouth nightly. 90 tablet 0    tamsulosin 0.4 MG Oral Cap Take 2 capsules (0.8 mg total) by mouth daily. 180 capsule 1       ACTIVE PROBLEM  Patient Active Problem List   Diagnosis    History of diffuse large B-cell lymphoma    Prostate cancer (HCC)    Type 2 diabetes mellitus with hyperglycemia, without long-term current use of insulin (HCC)    Overweight (BMI 25.0-29.9)    Former smoker    Age-related cataract of both eyes    Lymphoma of testis (HCC)    BPH (benign prostatic hyperplasia)    Health maintenance examination    Heart murmur       PAST MEDICAL HISTORY  Past Medical History:    Anesthesia complication    headache post lumbar puncture    BPH (benign prostatic hyperplasia)    Diabetes mellitus (HCC)    Diffuse large B-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 Uriostegui. on observation by Urology    Vitreous floaters       PAST SOCIAL  HISTORY  Social History     Socioeconomic History    Marital status:      Spouse name: Not on file    Number of children: 4    Years of education: Not on file    Highest education level: Not on file   Occupational History    Occupation: works for shipping   Tobacco Use    Smoking status: Former     Current packs/day: 0.00     Average packs/day: 1 pack/day for 47.0 years (47.0 ttl pk-yrs)     Types: Cigarettes     Start date:      Quit date: 2018     Years since quittin.4    Smokeless tobacco: Never   Vaping Use    Vaping status: Never Used   Substance and Sexual Activity    Alcohol use: Yes     Comment: occasionally 1-2 drinks on weekends    Drug use: No    Sexual activity: Not Currently     Partners: Female   Other Topics Concern     Service Not Asked    Blood Transfusions Not Asked    Caffeine Concern Yes     Comment: Coffee    Occupational Exposure Not Asked    Hobby Hazards Not Asked    Sleep Concern Not Asked    Stress Concern Not Asked    Weight Concern Not Asked    Special Diet Not Asked    Back Care Not Asked    Exercise No    Bike Helmet Not Asked    Seat Belt Not Asked    Self-Exams Not Asked   Social History Narrative    Relationships:  - Roz    Children: Marcia (with Roz). 3 other kids - Sugey Ibrahim Robert.     Pets: None    School: N/A    Work: Retired - used to do  for metal stamping    Origin: From [place], came [place] in     Interests: Enjoys exercising     Spiritual: Not Spiritism, not spiritual         Social Determinants of Health     Financial Resource Strain: Not on file   Food Insecurity: Not on file   Transportation Needs: Not on file   Physical Activity: Not on file   Stress: Not on file   Social Connections: Not on file   Housing Stability: Not on file       CAGE Alcohol Screen:   CAGE screening score of 0 on 2024, showing low risk of alcohol abuse.          PAST SURGICAL HISTORY  Past Surgical History:   Procedure  Laterality Date    Colonoscopy  2008    [place]    Colonoscopy N/A 04/20/2019    Procedure: COLONOSCOPY;  Surgeon: Harjinder Silvestre MD;  Location: [facility] ENDOSCOPY    Inguinal hernia repair      Port, indwelling, imp      Removal testis,simple Right        PAST FAMILY HISTORY  Family History   Problem Relation Age of Onset    Heart Disease Mother     Heart Disease Father     Breast Cancer Sister 40    Diabetes Sister     Cancer Sister         Kidney    No Known Problems Sister     No Known Problems Sister     No Known Problems Sister     No Known Problems Brother     No Known Problems Maternal Grandmother     No Known Problems Maternal Grandfather     No Known Problems Paternal Grandmother     No Known Problems Paternal Grandfather     Colon Cancer Neg     Prostate Cancer Neg        PHYSICAL EXAM  Vitals:    06/11/24 1512   BP: 134/76   Pulse: 74   Temp: 98.4 °F (36.9 °C)   SpO2: 98%   Weight: 186 lb (84.4 kg)   Height: 5' 10.5\" (1.791 m)      Body mass index is 26.31 kg/m².    Medicare Hearing Assessment:  Hearing Screening    Time taken: 6/11/2024  3:40 PM  Entry User: Denisha Preston CMA  Screening Method: Questionnaire  I have a problem hearing over the telephone: No I have trouble following the conversations when two or more people are talking at the same time: No   I have trouble understanding things on the TV: No I have to strain to understand conversations: No   I have to worry about missing the telephone ring or doorbell: No I have trouble hearing conversations in a noisy background such as a crowded room or restaurant: No   I get confused about where sounds come from: No I misunderstand some words in a sentence and need to ask people to repeat themselves: No   I especially have trouble understanding the speech of women and children: No I have trouble understanding the speaker in a large room such as at a meeting or place of Rastafarian: No   Many people I talk to seem to mumble (or don't speak  clearly): No People get annoyed because I misunderstand what they say: No   I misunderstand what others are saying and make inappropriate responses: No I avoid social activities because I cannot hear well and fear I will reply improperly: No   Family members and friends have told me they think I may have hearing loss: No           Visual Acuity:   Visual Acuity:   Right Eye Visual Acuity: Uncorrected Right Eye Chart Acuity: 20/70   Left Eye Visual Acuity: Uncorrected Left Eye Chart Acuity: 20/70   Both Eyes Visual Acuity: Uncorrected Both Eyes Chart Acuity: 20/70   Able To Tolerate Visual Acuity: Yes        GENERAL: NAD  HEENT: Moist mucous membranes, no tonsillar swelling or erythema, PERRLA bilat, TM translucent and non-bulging  NECK: Supple, non-tender  RESP: CTAB, no wheezing, no rales, no ronchi  CV: RRR, 2/6 systolic murmur  GI: Soft, non-distended, non-tender, no guarding, no rebound, no masses  MSK: No edema  SKIN: Warm and dry, no rashes  NEURO: Answering questions appropriately    LABS    Lab Results   Component Value Date    WBC 5.8 05/28/2024    HGB 13.8 05/28/2024     05/28/2024       Lab Results   Component Value Date    AST 17 05/28/2024    ALT 22 05/28/2024    CA 9.5 05/28/2024    ALB 4.5 05/28/2024    CREATSERUM 0.72 05/28/2024     (H) 05/28/2024       Lab Results   Component Value Date    CHOLEST 162 05/28/2024    HDL 46 05/28/2024    LDL 85 05/28/2024    TRIG 211 (H) 05/28/2024         DIAGNOSTICS    ASSESSMENT/PLAN  Problem List Items Addressed This Visit          HCC Problems    Lymphoma of testis (HCC)     Patient with a history of diffuse large B-cell lymphoma, lymphoma testis  Continue to follow with hematology         Prostate cancer (HCC)     Patient with a history of prostate cancer  Follows with urology         Type 2 diabetes mellitus with hyperglycemia, without long-term current use of insulin (HCC)     Patient with a history of diabetes.  Blood sugars have started to come  down.   Continue metformin 1000 mg twice daily  Increase glipizide ER to 10 mg daily.  Monitor blood sugars once daily throughout the day-can send over blood sugar log to me so that I can monitor.  Started on atorvastatin 10 mg nightly.  Needs to see ophthalmology.  Follow-up in 3 months.         Relevant Medications    glipiZIDE ER 10 MG Oral Tablet 24 Hr       Cardiac and Vasculature    Heart murmur     Systolic murmur auscultated.  Check echo.            Endocrine and Metabolic    Overweight (BMI 25.0-29.9)     Focus on a healthy diet and exercise.         Relevant Medications    glipiZIDE ER 10 MG Oral Tablet 24 Hr       Genitourinary and Reproductive    BPH (benign prostatic hyperplasia)     Currently on tamsulosin 0.4 mg nightly  Follows with urology            Hematology and Neoplasia    History of diffuse large B-cell lymphoma     Patient with a history of diffuse large B-cell lymphoma, lymphoma testis  Continue to follow with hematology            Eye    Age-related cataract of both eyes     Referral to ophthalmology provided.            Tobacco    Former smoker     CT lung screen ordered  AAA ultrasound         Relevant Orders    CT LUNG LD SCREENING(CPT=71271)    US ABDOMINAL AORTIC ANEURYSM SCREENING (CPT=76706)       Health Encounters    Health maintenance examination - Primary     Exercise and diet advised.  Labs reviewed in office.   Shingles vaccine - advised to complete at pharmacy   Advanced directive information provided.  Advised COVID vaccine.  Colonoscopy referral provided.  CT lung screen ordered.  AAA screen ordered.         Relevant Orders    GASTRO - INTERNAL       Return in about 3 months (around 9/11/2024) for follow up.    Lavon Prieto MD  Family Medicine    6/10/2024         Supplementary Documentation:   General Health:  In the past six months, have you lost more than 10 pounds without trying?: 2 - No  Has your appetite been poor?: No  Type of Diet: Diabetic  How does the patient  maintain a good energy level?: Daily Walks  How would you describe your daily physical activity?: Moderate  How would you describe your current health state?: Fair  How do you maintain positive mental well-being?: Visiting Family  On a scale of 0 to 10, with 0 being no pain and 10 being severe pain, what is your pain level?: 0 - (None)  In the past six months, have you experienced urine leakage?: 1-Yes  At any time do you feel concerned for the safety/well-being of yourself and/or your children, in your home or elsewhere?: No  Have you had any immunizations at another office such as Influenza, Hepatitis B, Tetanus, or Pneumococcal?: No        Mickey Ramos's SCREENING SCHEDULE   Tests on this list are recommended by your physician but may not be covered, or covered at this frequency, by your insurer.   Please check with your insurance carrier before scheduling to verify coverage.   PREVENTATIVE SERVICES FREQUENCY &  COVERAGE DETAILS LAST COMPLETION DATE   Diabetes Screening    Fasting Blood Sugar / Glucose    One screening every 12 months if never tested or if previously tested but not diagnosed with pre-diabetes   One screening every 6 months if diagnosed with pre-diabetes Lab Results   Component Value Date     (H) 05/28/2024        Cardiovascular Disease Screening    Lipid Panel  Cholesterol  Lipoprotein (HDL)  Triglycerides Covered every 5 years for all Medicare beneficiaries without apparent signs or symptoms of cardiovascular disease Lab Results   Component Value Date    CHOLEST 162 05/28/2024    HDL 46 05/28/2024    LDL 85 05/28/2024    TRIG 211 (H) 05/28/2024         Electrocardiogram (EKG)   Covered if needed at Welcome to Medicare, and non-screening if indicated for medical reasons 09/21/2016      Ultrasound Screening for Abdominal Aortic Aneurysm (AAA) Covered once in a lifetime for one of the following risk factors    Men who are 65-75 years old and have ever smoked    Anyone with a family  history -     Colorectal Cancer Screening  Covered for ages 50-85; only need ONE of the following:    Colonoscopy   Covered every 10 years    Covered every 2 years if patient is at high risk or previous colonoscopy was abnormal 04/20/2019    Health Maintenance   Topic Date Due    Colorectal Cancer Screening  04/20/2024       Flexible Sigmoidoscopy   Covered every 4 years -    Fecal Occult Blood Test Covered annually -   Prostate Cancer Screening    Prostate-Specific Antigen (PSA) Annually Lab Results   Component Value Date    PSA 2.93 05/03/2023     Health Maintenance   Topic Date Due    PSA  05/28/2026      Immunizations    Influenza Covered once per flu season  Please get every year -  No recommendations at this time    Pneumococcal Each vaccine ([id number] & [id number]) covered once after 65 Prevnar 13: -    [id number]: 08/28/2019     No recommendations at this time    Hepatitis B One screening covered for patients with certain risk factors   -  No recommendations at this time    Tetanus Toxoid Not covered by Medicare Part B unless medically necessary (cut with metal); may be covered with your pharmacy prescription benefits -    Tetanus, Diptheria and Pertusis TD and TDaP Not covered by Medicare Part B -  No recommendations at this time    Zoster Not covered by Medicare Part B; may be covered with your pharmacy  prescription benefits -  Zoster Vaccines(1 of 2) Never done     Diabetes      Hemoglobin A1C Annually; if last result is elevated, may be asked to retest more frequently.    Medicare covers every 3 months Lab Results   Component Value Date     (H) 05/03/2023    A1C 11.3 (H) 05/28/2024       No recommendations at this time    Creat/alb ratio Annually Lab Results   Component Value Date    MICROALBCREA  09/21/2022      Comment:      Unable to calculate due to Urine Creatinine <13.00 mg/dL    Unable to calculate due to Urine Microalbumin <0.5 mg/dL         LDL Annually Lab Results   Component Value  Date    LDL 85 05/28/2024       Dilated Eye Exam Annually Last Diabetic Eye Exam:  No data recorded  No data recorded

## 2024-06-11 ENCOUNTER — OFFICE VISIT (OUTPATIENT)
Dept: INTERNAL MEDICINE CLINIC | Facility: CLINIC | Age: 69
End: 2024-06-11
Payer: MEDICARE

## 2024-06-11 VITALS
DIASTOLIC BLOOD PRESSURE: 76 MMHG | HEART RATE: 74 BPM | OXYGEN SATURATION: 98 % | WEIGHT: 186 LBS | SYSTOLIC BLOOD PRESSURE: 134 MMHG | TEMPERATURE: 98 F | HEIGHT: 70.5 IN | BODY MASS INDEX: 26.33 KG/M2

## 2024-06-11 DIAGNOSIS — H25.9 AGE-RELATED CATARACT OF BOTH EYES, UNSPECIFIED AGE-RELATED CATARACT TYPE: ICD-10-CM

## 2024-06-11 DIAGNOSIS — Z87.891 FORMER SMOKER: ICD-10-CM

## 2024-06-11 DIAGNOSIS — E66.3 OVERWEIGHT (BMI 25.0-29.9): ICD-10-CM

## 2024-06-11 DIAGNOSIS — C85.99 LYMPHOMA OF TESTIS (HCC): ICD-10-CM

## 2024-06-11 DIAGNOSIS — C61 PROSTATE CANCER (HCC): ICD-10-CM

## 2024-06-11 DIAGNOSIS — R01.1 HEART MURMUR: ICD-10-CM

## 2024-06-11 DIAGNOSIS — Z85.72 HISTORY OF DIFFUSE LARGE B-CELL LYMPHOMA: ICD-10-CM

## 2024-06-11 DIAGNOSIS — E11.65 TYPE 2 DIABETES MELLITUS WITH HYPERGLYCEMIA, WITHOUT LONG-TERM CURRENT USE OF INSULIN (HCC): ICD-10-CM

## 2024-06-11 DIAGNOSIS — Z00.00 HEALTH MAINTENANCE EXAMINATION: Primary | ICD-10-CM

## 2024-06-11 DIAGNOSIS — N40.1 BENIGN PROSTATIC HYPERPLASIA WITH LOWER URINARY TRACT SYMPTOMS, SYMPTOM DETAILS UNSPECIFIED: ICD-10-CM

## 2024-06-11 RX ORDER — GLIPIZIDE 10 MG/1
10 TABLET, FILM COATED, EXTENDED RELEASE ORAL DAILY
Qty: 90 TABLET | Refills: 0 | Status: SHIPPED | OUTPATIENT
Start: 2024-06-11

## 2024-06-11 NOTE — ASSESSMENT & PLAN NOTE
Patient with a history of diabetes.  Blood sugars have started to come down.   Continue metformin 1000 mg twice daily  Increase glipizide ER to 10 mg daily.  Monitor blood sugars once daily throughout the day-can send over blood sugar log to me so that I can monitor.  Started on atorvastatin 10 mg nightly.  Needs to see ophthalmology.  Follow-up in 3 months.

## 2024-06-11 NOTE — ASSESSMENT & PLAN NOTE
Exercise and diet advised.  Labs reviewed in office.   Shingles vaccine - advised to complete at pharmacy   Advanced directive information provided.  Advised COVID vaccine.  Colonoscopy referral provided.  CT lung screen ordered.  AAA screen ordered.

## 2024-06-11 NOTE — PATIENT INSTRUCTIONS
PATIENT INSTRUCTIONS    Thank you for seeing me today, it was a pleasure taking care of you.  Please check out at the  and schedule a follow up appointment.  Return in about 3 months (around 9/11/2024) for follow up.  Please remember that the charlene period for all appointments is 5 minutes. This is to help maximize the amount of time that we can spend together at our visits.    The following imaging studies were ordered: CT lungs, AAA ultrasound  Please call [phone] to schedule your imaging appointment.   Please also follow up with the following specialists: Urology, Oncology, Ophthalmology, GI - colonoscopy  Please call [phone] to talk to an individual who will help you schedule your colonoscopy.  Please fill out the advance directive information (power of  documents) and bring a copy to our clinic.  Continue to focus on a healthy diet and exercise  Can continue monitor your blood sugars, and can send me blood sugar values after 1 month         Best,   Dr. Conner Ramos's SCREENING SCHEDULE   Tests on this list are recommended by your physician but may not be covered, or covered at this frequency, by your insurer.   Please check with your insurance carrier before scheduling to verify coverage.   PREVENTATIVE SERVICES FREQUENCY &  COVERAGE DETAILS LAST COMPLETION DATE   Diabetes Screening    Fasting Blood Sugar / Glucose    One screening every 12 months if never tested or if previously tested but not diagnosed with pre-diabetes   One screening every 6 months if diagnosed with pre-diabetes Lab Results   Component Value Date     (H) 05/28/2024        Cardiovascular Disease Screening    Lipid Panel  Cholesterol  Lipoprotein (HDL)  Triglycerides Covered every 5 years for all Medicare beneficiaries without apparent signs or symptoms of cardiovascular disease Lab Results   Component Value Date    CHOLEST 162 05/28/2024    HDL 46 05/28/2024    LDL 85 05/28/2024    TRIG 211  (H) 05/28/2024         Electrocardiogram (EKG)   Covered if needed at Welcome to Medicare, and non-screening if indicated for medical reasons 09/21/2016      Ultrasound Screening for Abdominal Aortic Aneurysm (AAA) Covered once in a lifetime for one of the following risk factors   • Men who are 65-75 years old and have ever smoked   • Anyone with a family history -     Colorectal Cancer Screening  Covered for ages 50-85; only need ONE of the following:    Colonoscopy   Covered every 10 years    Covered every 2 years if patient is at high risk or previous colonoscopy was abnormal 04/20/2019    Health Maintenance   Topic Date Due   • Colorectal Cancer Screening  04/20/2024       Flexible Sigmoidoscopy   Covered every 4 years -    Fecal Occult Blood Test Covered annually -   Prostate Cancer Screening    Prostate-Specific Antigen (PSA) Annually Lab Results   Component Value Date    PSA 2.93 05/03/2023     Health Maintenance   Topic Date Due   • PSA  05/28/2026      Immunizations    Influenza Covered once per flu season  Please get every year -  No recommendations at this time    Pneumococcal Each vaccine ([id number] & [id number]) covered once after 65 Prevnar 13: -    Vychjixlz47: 08/28/2019     No recommendations at this time    Hepatitis B One screening covered for patients with certain risk factors   -  No recommendations at this time    Tetanus Toxoid Not covered by Medicare Part B unless medically necessary (cut with metal); may be covered with your pharmacy prescription benefits -    Tetanus, Diptheria and Pertusis TD and TDaP Not covered by Medicare Part B -  No recommendations at this time    Zoster Not covered by Medicare Part B; may be covered with your pharmacy  prescription benefits -  Zoster Vaccines(1 of 2) Never done     Diabetes      Hemoglobin A1C Annually; if last result is elevated, may be asked to retest more frequently.    Medicare covers every 3 months Lab Results   Component Value Date      (H) 05/03/2023    A1C 11.3 (H) 05/28/2024       No recommendations at this time    Creat/alb ratio Annually Lab Results   Component Value Date    MICROALBCREA  09/21/2022      Comment:      Unable to calculate due to Urine Creatinine <13.00 mg/dL    Unable to calculate due to Urine Microalbumin <0.5 mg/dL         LDL Annually Lab Results   Component Value Date    LDL 85 05/28/2024       Dilated Eye Exam Annually [unfilled]

## 2024-06-11 NOTE — ASSESSMENT & PLAN NOTE
Patient with a history of diffuse large B-cell lymphoma, lymphoma testis  Continue to follow with hematology

## 2024-06-12 NOTE — DISCHARGE INSTRUCTIONS
CIRUGIA AMBULATORIA: INSTRUCCIONES DESPUES DE MAYNOR RECIBIDO ANESTESIA  Debido a la Anestesia y a las medicamentos que se le aplicaron jenny la cirugia, ruba reflejos y capacidaddiscernimiento pueden verse afectados. Tambien podria tener un poco de mareo.Aparte de siguir las precauciones de sentico comun, le recomendamos lo siguiente:     El paciente debe estar acompañado por alguien hasta la mañana siguiente.     No maneje ningun vehiculo automotor ni monte bicicleta.     No tome ninguna decision importante marycarmen por ejemplo firmar de documentos importantes.     No opere herramientas electricas ni electrodomesticos, tales marycarmen cuchillos electricos, batidoras electricas o serruchos electricos. No zully el cesped con cortadoras electricas. No practique deportes.     No anu ejercicio.     Para evitar las nauseas, coma menos de lo normal mas o menos la mitad de lo habitual y/o sandee solo liquidos hasta la manana siguiente. Consulte con muro doctor si esta llevando akhil dieta especial.     No tome bebidas alcoholicas, tranquilizantes, pastilles para dormir etc., y verifique con muro doctor acerca de cualquier medicamento que concepcion tomando actualmente.     El efecto de la medicación usada en muro anestesia habra pasado cas por completo a la medianoche. Por lo tanto, puede reanudar ruba habitos cotidianos en la mañana.     Los adultos deben descansar lo alyssa posible por las siguientes 24 horas. Los niños deben permanecer en cama lo alyssa posible por las siguientes 24 horas.     Si se presenta cualquier problema, puede llamar a muro propio doctor personal o aceda al centro de [facility].    Si sigue estas instrucciones, se sentira major y estara mas seguro despues de muro cirugia ambulatoria. Sitiene cualquier pregunta, llame al hospital y pida que lo comuniquen con la enfermera de cirugia [phone], extension [phone].    Instrucciones para el irish: después de muro cirugía   Acaba de  someterse a akhil cirugía. Jenny la cirugía, le administraron un tipo de medicamento llamado anestesia para que esté relajado y no sienta dolor. Después de la cirugía, oni vez sienta algo de dolor o náuseas. [place] es común. Estos son algunos consejos para sentirse mejor y recuperarse preeti después de la cirugía.   El regreso a casa  Muro proveedor de atención médica le enseñará cómo cuidarse cuando regrese a muro casa. También responderá ruba preguntas. Pida a un familiar o amigo adulto que lo conduzca a muro casa. Jenny las primeras 24 horas después de la cirugía, siga estas recomendaciones:   No conduzca ni use maquinaria pesada.  No tome decisiones importantes ni firme ningún documento legal.  Adminístrese los medicamentos según las indicaciones.  Evite el consumo de alcohol.  Si es necesario, coordine para que alguien se quede con usted. Esta persona puede vigilar cualquier problema que se presente y lo ayudará a permanecer seguro.  Asegúrese de asistir a todas ruba visitas de control con muro proveedor de atención médica. Y descanse después de la cirugía jenny el tiempo que le indique muro proveedor.   Cómo sobrellevar el dolor  Si siente dolor después de la cirugía, los analgésicos lo ayudarán a sentirse mejor. [place] los analgésicos según las indicaciones, antes de que el dolor se intensifique. Además, pregunte a muro proveedor de atención médica o al farmacéutico acerca de otras formas de controlar el dolor. Estas podrían incluir aplicar calor o hielo, o hacer ejercicios de relajación. Y siga todas las instrucciones que le dé muro cirujano o enfermero.      [place] el cronograma de ruba medicamentos.     Consejos para ayo analgésicos  Para aliviar el dolor lo hien posible, recuerde estos puntos:   Los analgésicos pueden causar malestar estomacal. Tomarlos con un poco de comida puede aliviar concepcion efecto.  La mayoría de los calmantes que se fartun por la boca necesitan por lo menos de 20 a 30 minutos para surtir efecto.  No  espere hasta que muro dolor se vuelva intenso para radha el analgésico que le indicaron. Intente que el momento en que puede radha muro medicamento coincida con otra actividad. [place]ina podría ser el momento antes de vestirse, chaitanya un paseo o sentarse a la augustine para cenar.  El estreñimiento es un efecto secundario frecuente de algunos analgésicos. Consulte a muro proveedor de atención médica antes de usar cualquier medicamento, marycarmen laxantes o ablandadores de heces, para ayudar a aliviar el estreñimiento. También consulte si es preciso evitar algún tipo de alimento. Radha mucha cantidad de líquido y comer alimentos marycarmen frutas y verduras con alto contenido de fibra también puede ser beneficioso. Recuerde que no debe radha laxantes a menos que muro cirujano se los indique.  Mezclar bebidas alcohólicas y analgésicos puede causar mareos y enlentecer muro respiración. Y hasta puede ser mortal. No sandee alcohol mientras esté tomando calmantes.  Los analgésicos pueden hacer que tenga reacciones más lentas. No conduzca ni opere maquinaria mientras esté tomando analgésicos.  Muro proveedor de atención médica puede indicarle que tome acetaminofén (paracetamol) para ayudar a aliviar el dolor. Pregúntele qué cantidad debe radha por día. El acetaminofén y otros analgésicos pueden interactuar con ruba medicamentos recetados u otros medicamentos de venta obi (OTC, por ruba siglas en inglés). Algunos medicamentos recetados contienen acetaminofén y otros ingredientes. Combinar medicamentos recetados y acetaminofén de venta obi para aliviar el dolor puede provocarle akhil sobredosis accidental. [place] atentamente la etiqueta del envase de ruba medicamentos OTC. [place] lo ayudará a saber con exactitud la lista de ingredientes, la cantidad que debe radha y cualquier advertencia. [place] también puede ayudarlo a evitar radha demasiado acetaminofén. Si tiene preguntas o no entiende la información, pídale a muro farmacéutico o proveedor de atención médica que se la  explique antes de ayo el medicamento OTC.   Manejo de las náuseas  Algunas personas pueden sentir malestar estomacal (náuseas) después de la cirugía. [place] suele suceder debido a la anestesia, el dolor, los analgésicos, la disminución del movimiento de la comida en el estómago o el estrés de la cirugía. Estos consejos lo ayudarán a manejar las náuseas y a comer alimentos más saludables mientras se recupera. Si seguía un plan alimentario especial antes de la cirugía, pregúntele a muro proveedor de atención médica si debe continuarlo mientras se recupera. Consulte con muro proveedor cómo debería continuar muro alimentación. Esta puede variar según el tipo de cirugía a la que se sometió. Los siguientes consejos generales pueden serle útiles:   No se fuerce a comer. Guíese por muro cuerpo para saber cuándo comer y qué cantidad.  Comience con líquidos transparentes y sopa. Estos son más fáciles de digerir.  Tan pronto marycarmen se sienta listo, intente comer alimentos semisólidos. Estos incluyen puré de cynthia, puré de manzana y gelatina.  Lentamente, pase a alimentos sólidos. Al principio no coma alimentos grasosos, pesados ni condimentados.  No se fuerce a hacer yumiko comidas grandes al día. En cambio, coma cantidades pequeñas, frances con mayor frecuencia.  [place] los analgésicos con akhil pequeña cantidad de alimentos sólidos, marycarmen galletas saladas o akhil tostada. [place] ayuda a prevenir las náuseas.  Cuándo llamar a muro proveedor de atención médica   Llame de inmediato a muro proveedor de atención médica si nota alguno de los siguientes síntomas:   Sigue teniendo mucho dolor, o el dolor empeora, después de ayo el medicamento. Puede que el medicamento no sea lo suficientemente [place]. O preeti, puede alec complicaciones de la cirugía.  Se siente demasiado somnoliento, mareado o adormecido. Quizás el medicamento sea demasiado [place].  Tiene efectos secundarios, marycarmen náuseas o vómitos. Muro proveedor de atención médica puede recomendarle ayo  otros medicamentos.  Tiene cambios en la piel, marycarmen sarpullido, picazón o urticaria. [place] puede significar que tiene akhil reacción alérgica. Muro proveedor puede recomendarle ayo otros medicamentos.  La incisión tiene un aspecto diferente (por ejemplo, se abre akhil parte).  Tiene sangrado o supuración de líquido de la herida y no le dijeron que eso era esperable.  Fiebre de 100.4 °F (38 °C) o más, o según le indique muro proveedor.  Cuándo llamar al 911  Llame al  911  de inmediato si tiene:   Dificultad para respirar  Edna hinchada    Si tiene apnea del sueño obstructiva   [place] la cirugía, le administraron anestesia para que esté cómodo y no sienta dolor. Después de la cirugía, es probable que tenga más ataques de apnea causados por la anestesia y otros medicamentos que le administraron. Los ataques pueden durar más de lo habitual.    En umro casa, anu lo siguiente:  Cuando duerma, siga usando muro dispositivo de presión positiva continua en las vías respiratorias (CPAP, por ruba siglas en inglés). A menos que muro proveedor de atención médica le indique lo contrario, úselo siempre que duerma, ya sea de día o de noche. El dispositivo de CPAP suele usarse para tratar la apnea obstructiva del sueño.  Consulte a muro proveedor antes de ayo cualquier analgésico, relajante muscular o sedante. Muro proveedor le dará información sobre los peligros de ayo estos medicamentos.  Comuníquese con muro proveedor si tiene el sueño demasiado alterado, incluso cuando esté tomando los medicamentos según las instrucciones.  © 8861-0948 [employer]. Todos los derechos reservados. Esta información no pretende sustituir la atención médica profesional. Sólo muro médico puede diagnosticar y tratar un problema de imelda.

## 2024-06-13 ENCOUNTER — OFFICE VISIT (OUTPATIENT)
Dept: HEMATOLOGY/ONCOLOGY | Facility: HOSPITAL | Age: 69
End: 2024-06-13
Attending: INTERNAL MEDICINE
Payer: MEDICARE

## 2024-06-13 ENCOUNTER — TELEPHONE (OUTPATIENT)
Dept: INTERNAL MEDICINE CLINIC | Facility: CLINIC | Age: 69
End: 2024-06-13

## 2024-06-13 VITALS
TEMPERATURE: 98 F | HEIGHT: 70 IN | SYSTOLIC BLOOD PRESSURE: 140 MMHG | DIASTOLIC BLOOD PRESSURE: 72 MMHG | OXYGEN SATURATION: 99 % | BODY MASS INDEX: 26.31 KG/M2 | WEIGHT: 183.81 LBS | HEART RATE: 53 BPM | RESPIRATION RATE: 16 BRPM

## 2024-06-13 DIAGNOSIS — Z85.72 HISTORY OF DIFFUSE LARGE B-CELL LYMPHOMA: Primary | ICD-10-CM

## 2024-06-13 DIAGNOSIS — E11.8 CONTROLLED TYPE 2 DIABETES MELLITUS WITH COMPLICATION, WITHOUT LONG-TERM CURRENT USE OF INSULIN (HCC): Primary | ICD-10-CM

## 2024-06-13 DIAGNOSIS — Z08 ENCOUNTER FOR FOLLOW-UP SURVEILLANCE OF DIFFUSE LARGE B-CELL LYMPHOMA: ICD-10-CM

## 2024-06-13 DIAGNOSIS — Z85.72 HISTORY OF DIFFUSE LARGE B-CELL LYMPHOMA: ICD-10-CM

## 2024-06-13 DIAGNOSIS — Z85.79 ENCOUNTER FOR FOLLOW-UP SURVEILLANCE OF DIFFUSE LARGE B-CELL LYMPHOMA: ICD-10-CM

## 2024-06-13 LAB
ALBUMIN SERPL-MCNC: 4.3 G/DL (ref 3.2–4.8)
ALBUMIN/GLOB SERPL: 1.7 {RATIO} (ref 1–2)
ALP LIVER SERPL-CCNC: 73 U/L
ALT SERPL-CCNC: 21 U/L
ANION GAP SERPL CALC-SCNC: 5 MMOL/L (ref 0–18)
AST SERPL-CCNC: 17 U/L (ref ?–34)
BASOPHILS # BLD AUTO: 0.08 X10(3) UL (ref 0–0.2)
BASOPHILS NFR BLD AUTO: 1.5 %
BILIRUB SERPL-MCNC: 0.6 MG/DL (ref 0.2–1.1)
BUN BLD-MCNC: 10 MG/DL (ref 9–23)
BUN/CREAT SERPL: 15.6 (ref 10–20)
CALCIUM BLD-MCNC: 9.3 MG/DL (ref 8.7–10.4)
CHLORIDE SERPL-SCNC: 107 MMOL/L (ref 98–112)
CO2 SERPL-SCNC: 28 MMOL/L (ref 21–32)
CREAT BLD-MCNC: 0.64 MG/DL
DEPRECATED RDW RBC AUTO: 42.5 FL (ref 35.1–46.3)
EGFRCR SERPLBLD CKD-EPI 2021: 104 ML/MIN/1.73M2 (ref 60–?)
EOSINOPHIL # BLD AUTO: 0.16 X10(3) UL (ref 0–0.7)
EOSINOPHIL NFR BLD AUTO: 3.1 %
ERYTHROCYTE [DISTWIDTH] IN BLOOD BY AUTOMATED COUNT: 12.4 % (ref 11–15)
GLOBULIN PLAS-MCNC: 2.6 G/DL (ref 2–3.5)
GLUCOSE BLD-MCNC: 109 MG/DL (ref 70–99)
HCT VFR BLD AUTO: 36.4 %
HGB BLD-MCNC: 12.7 G/DL
IMM GRANULOCYTES # BLD AUTO: 0.01 X10(3) UL (ref 0–1)
IMM GRANULOCYTES NFR BLD: 0.2 %
LDH SERPL L TO P-CCNC: 164 U/L
LYMPHOCYTES # BLD AUTO: 2.57 X10(3) UL (ref 1–4)
LYMPHOCYTES NFR BLD AUTO: 49.2 %
MCH RBC QN AUTO: 32.8 PG (ref 26–34)
MCHC RBC AUTO-ENTMCNC: 34.9 G/DL (ref 31–37)
MCV RBC AUTO: 94.1 FL
MONOCYTES # BLD AUTO: 0.32 X10(3) UL (ref 0.1–1)
MONOCYTES NFR BLD AUTO: 6.1 %
NEUTROPHILS # BLD AUTO: 2.08 X10 (3) UL (ref 1.5–7.7)
NEUTROPHILS # BLD AUTO: 2.08 X10(3) UL (ref 1.5–7.7)
NEUTROPHILS NFR BLD AUTO: 39.9 %
OSMOLALITY SERPL CALC.SUM OF ELEC: 290 MOSM/KG (ref 275–295)
PLATELET # BLD AUTO: 186 10(3)UL (ref 150–450)
POTASSIUM SERPL-SCNC: 4 MMOL/L (ref 3.5–5.1)
PROT SERPL-MCNC: 6.9 G/DL (ref 5.7–8.2)
RBC # BLD AUTO: 3.87 X10(6)UL
SODIUM SERPL-SCNC: 140 MMOL/L (ref 136–145)
WBC # BLD AUTO: 5.2 X10(3) UL (ref 4–11)

## 2024-06-13 PROCEDURE — 80053 COMPREHEN METABOLIC PANEL: CPT

## 2024-06-13 PROCEDURE — 85025 COMPLETE CBC W/AUTO DIFF WBC: CPT

## 2024-06-13 PROCEDURE — 83615 LACTATE (LD) (LDH) ENZYME: CPT

## 2024-06-13 PROCEDURE — 36415 COLL VENOUS BLD VENIPUNCTURE: CPT

## 2024-06-13 PROCEDURE — 99213 OFFICE O/P EST LOW 20 MIN: CPT | Performed by: INTERNAL MEDICINE

## 2024-06-13 NOTE — PROGRESS NOTES
HPI     Mickey Ramos is a 68 year old male here for follow up of   Encounter Diagnoses   Name Primary?    History of diffuse large B-cell lymphoma Yes    Encounter for follow-up surveillance of diffuse large B-cell lymphoma         S/p Cycle 6 RCHOP/ RT completed:  The patient was treated to the contralateral testis and scrotum to a dose of 3000 cGy in 150 cGy daily fractions.  This was done utilizing a 16 MEV electron beam with custom shielding.  He began treatment on 04/19/2017 and completed on 05/17/2017 for a total of 28 elapsed days in which he received all 20 prescribed radiation treatments.  Completed 4 IT MTX prophylactic.      Pt here today for follow up.     States feeling well.      No fevers. No NS. No weight loss.    No new palpable LNs.      States up to date on vaccines.    ECOG PS  0    Review of Systems:   Review of Systems   Constitutional:  Negative for appetite change, fatigue, fever and unexpected weight change.   Respiratory:  Negative for cough and shortness of breath.    Cardiovascular:  Negative for chest pain.   Gastrointestinal:  Negative for abdominal pain.   Musculoskeletal:  Negative for arthralgias, back pain and neck pain.        No bone pain   Neurological:  Negative for dizziness, headaches and numbness.   Hematological:  Negative for adenopathy. Does not bruise/bleed easily.   Psychiatric/Behavioral:  Positive for sleep disturbance.          Meds:  Current Outpatient Medications   Medication Sig Dispense Refill    glipiZIDE ER 10 MG Oral Tablet 24 Hr Take 1 tablet (10 mg total) by mouth daily. 90 tablet 0    metFORMIN HCl 1000 MG Oral Tab Take 1 tablet (1,000 mg total) by mouth 2 (two) times daily with meals. 180 tablet 1    Blood Glucose Monitoring Suppl (ACCU-CHEK GUIDE ME) w/Device Does not apply Kit 1 each daily. Check blood sugars once as prescribed. 1 kit 0    Glucose Blood (ACCU-CHEK GUIDE) In Vitro Strip Check blood sugars 3 times daily 100 strip 3    Accu-Chek  Softclix Lancets Does not apply Misc 1 Lancet by Finger stick route in the morning, at noon, and at bedtime. Use as directed. 100 each 0    atorvastatin 10 MG Oral Tab Take 1 tablet (10 mg total) by mouth nightly. 90 tablet 0    tamsulosin 0.4 MG Oral Cap Take 2 capsules (0.8 mg total) by mouth daily. 180 capsule 1     Allergies:   No Known Allergies    Past Medical History:    Anesthesia complication    headache post lumbar puncture    BPH (benign prostatic hyperplasia)    Diabetes mellitus (HCC)    Diffuse large B-cell lymphoma of solid organ excluding spleen (HCC)    Diverticulosis of large intestine without hemorrhage    Erectile dysfunction due to arterial insufficiency    ESBL (extended spectrum beta-lactamase) producing bacteria infection    Internal hemorrhoids    Portacath in place    Prostate cancer (HCC)    low volume, [place] 3+3, Dx by Dr. Uriostegui. on observation by Urology    Vitreous floaters     Past Surgical History:   Procedure Laterality Date    Colonoscopy      [place]    Colonoscopy N/A 2019    Procedure: COLONOSCOPY;  Surgeon: Harjinder Silvestre MD;  Location: [facility] ENDOSCOPY    Inguinal hernia repair      Port, indwelling, imp      Removal testis,simple Right      Social History     Socioeconomic History    Marital status:     Number of children: 4   Occupational History    Occupation: works for [employer] w/   Tobacco Use    Smoking status: Former     Current packs/day: 0.00     Average packs/day: 1 pack/day for 47.0 years (47.0 ttl pk-yrs)     Types: Cigarettes     Start date:      Quit date: 2018     Years since quittin.4    Smokeless tobacco: Never   Vaping Use    Vaping status: Never Used   Substance and Sexual Activity    Alcohol use: Yes     Comment: occasionally 1-2 drinks on weekends    Drug use: No    Sexual activity: Not Currently     Partners: Female   Other Topics Concern    Caffeine Concern Yes     Comment: Coffee    Exercise No   Social History Narrative     Relationships:  - Roz    Children: Marcia (with Roz). 3 other kids - Alexandria, Sugey, Memo.     Pets: None    School: N/A    Work: Retired - used to do  for metal stamping    Origin: From [place], came to [place] in [place]    Interests: Enjoys exercising     Spiritual: Not Faith, not spiritual           Family History   Problem Relation Age of Onset    Heart Disease Mother     Heart Disease Father     Breast Cancer Sister 40    Diabetes Sister     Cancer Sister         Kidney    No Known Problems Sister     No Known Problems Sister     No Known Problems Sister     No Known Problems Brother     No Known Problems Maternal Grandmother     No Known Problems Maternal Grandfather     No Known Problems Paternal Grandmother     No Known Problems Paternal Grandfather     Colon Cancer Neg     Prostate Cancer Neg          PHYSICAL EXAM:    /72 (BP Location: Left arm, Patient Position: Sitting, Cuff Size: adult)   Pulse 53   Temp 97.9 °F (36.6 °C) (Oral)   Resp 16   Ht 1.778 m (5' 10\")   Wt 83.4 kg (183 lb 12.8 oz)   SpO2 99%   BMI 26.37 kg/m²   Wt Readings from Last 6 Encounters:   06/13/24 83.4 kg (183 lb 12.8 oz)   06/11/24 84.4 kg (186 lb)   05/28/24 83.5 kg (184 lb)   08/29/23 85.7 kg (189 lb)   06/28/23 85.7 kg (189 lb)   05/02/23 85.7 kg (189 lb)     Physical Exam   General: Patient is alert and oriented x 3, not in acute distress.  HEENT: EOMs intact. PERRL.  Neck: No JVD. No palpable lymphadenopathy. Neck is supple.  Chest: Clear to auscultation.  Heart: Regular rate and rhythm.   Abdomen: Soft, non tender with good bowel sounds.  Extremities: No edema.  Neurological: Grossly intact.   Lymphatics: There is no palpable lymphadenopathy throughout in the cervical, supraclavicular, axillary, or inguinal regions.  Psych/Depression: normal        ASSESSMENT/PLAN:     Encounter Diagnoses   Name Primary?    History of diffuse large B-cell lymphoma Yes    Encounter for  follow-up surveillance of diffuse large B-cell lymphoma        Stage IV DLBCL with testicular involvement.     Discussed with the patient the aggressive nature of the disease, however, goal of treatment is still curative.      Complete standard treatment with RCHOP x 6 cycles followed by L testicular RT, followed by 4 doses of IT MTX for CNS prophylaxis as recommended for testicular lymphoma.    EF 63% and negative stress test.    Tolerated well. S/p RT L testicular RT.  Completed 4 doses of prophylactic IT MTX.      Had persistent HA after IT chemo.  Possible subdural hematoma.  HA resolved.      On surveillance as per NCCN guidelines with CBC, CMP and LDH every 3 months for 2 yrs and CT scan every 6 months for 2 yrs (from October 2017 to October 2019).      Last CT in October of 2019 with GERALDO.      Yearly follow up with labs.     Recommended flu shot and COVID vaccine yearly.    No orders of the defined types were placed in this encounter.  MDM low    Results From Past 48 Hours:  Recent Results (from the past 48 hour(s))   COMP METABOLIC PANEL [E]    Collection Time: 06/13/24  7:29 AM   Result Value Ref Range    Glucose 109 (H) 70 - 99 mg/dL    Sodium 140 136 - 145 mmol/L    Potassium 4.0 3.5 - 5.1 mmol/L    Chloride 107 98 - 112 mmol/L    CO2 28.0 21.0 - 32.0 mmol/L    Anion Gap 5 0 - 18 mmol/L    BUN 10 9 - 23 mg/dL    Creatinine 0.64 (L) 0.70 - 1.30 mg/dL    BUN/CREA Ratio 15.6 10.0 - 20.0    Calcium, Total 9.3 8.7 - 10.4 mg/dL    Calculated Osmolality 290 275 - 295 mOsm/kg    eGFR-Cr 104 >=60 mL/min/1.73m2    ALT 21 10 - 49 U/L    AST 17 <=34 U/L    Alkaline Phosphatase 73 45 - 117 U/L    Bilirubin, Total 0.6 0.2 - 1.1 mg/dL    Total Protein 6.9 5.7 - 8.2 g/dL    Albumin 4.3 3.2 - 4.8 g/dL    Globulin  2.6 2.0 - 3.5 g/dL    A/G Ratio 1.7 1.0 - 2.0    Patient Fasting for CMP? Patient not present    LDH [E]    Collection Time: 06/13/24  7:29 AM   Result Value Ref Range     120 - 246 U/L   CBC W/  DIFFERENTIAL    Collection Time: 06/13/24  7:29 AM   Result Value Ref Range    WBC 5.2 4.0 - 11.0 x10(3) uL    RBC 3.87 3.80 - 5.80 x10(6)uL    HGB 12.7 (L) 13.0 - 17.5 g/dL    HCT 36.4 (L) 39.0 - 53.0 %    MCV 94.1 80.0 - 100.0 fL    MCH 32.8 26.0 - 34.0 pg    MCHC 34.9 31.0 - 37.0 g/dL    RDW-SD 42.5 35.1 - 46.3 fL    RDW 12.4 11.0 - 15.0 %    .0 150.0 - 450.0 10(3)uL    Neutrophil Absolute Prelim 2.08 1.50 - 7.70 x10 (3) uL    Neutrophil Absolute 2.08 1.50 - 7.70 x10(3) uL    Lymphocyte Absolute 2.57 1.00 - 4.00 x10(3) uL    Monocyte Absolute 0.32 0.10 - 1.00 x10(3) uL    Eosinophil Absolute 0.16 0.00 - 0.70 x10(3) uL    Basophil Absolute 0.08 0.00 - 0.20 x10(3) uL    Immature Granulocyte Absolute 0.01 0.00 - 1.00 x10(3) uL    Neutrophil % 39.9 %    Lymphocyte % 49.2 %    Monocyte % 6.1 %    Eosinophil % 3.1 %    Basophil % 1.5 %    Immature Granulocyte % 0.2 %          Imaging & Referrals:  None   No orders of the defined types were placed in this encounter.    Component      Latest Ref Rng & Units 2/23/2023 8/30/2022   WBC      4.0 - 11.0 x10(3) uL 6.5 6.9   RBC      3.80 - 5.80 x10(6)uL 3.86 3.81   Hemoglobin      13.0 - 17.5 g/dL 12.6 (L) 12.3 (L)   Hematocrit      39.0 - 53.0 % 36.7 (L) 37.7 (L)   MCV      80.0 - 100.0 fL 95.1 99.0   MCH      26.0 - 34.0 pg 32.6 32.3   MCHC      31.0 - 37.0 g/dL 34.3 32.6   RDW-SD      35.1 - 46.3 fL 44.5 47.5 (H)   RDW      11.0 - 15.0 % 12.9 13.1   Platelet Count      150.0 - 450.0 10(3)uL 189.0 203.0   Prelim Neutrophil Abs      1.50 - 7.70 x10 (3) uL 2.78 3.32   Neutrophils Absolute      1.50 - 7.70 x10(3) uL 2.78 3.32   Lymphocytes Absolute      1.00 - 4.00 x10(3) uL 3.13 2.98   Monocytes Absolute      0.10 - 1.00 x10(3) uL 0.41 0.42   Eosinophils Absolute      0.00 - 0.70 x10(3) uL 0.13 0.14   Basophils Absolute      0.00 - 0.20 x10(3) uL 0.04 0.05   Immature Granulocyte Absolute      0.00 - 1.00 x10(3) uL 0.01 0.01   Neutrophils %      % 42.7 48.0   Lymphocytes  %      % 48.2 43.1   Monocytes %      % 6.3 6.1   Eosinophils %      % 2.0 2.0   Basophils %      % 0.6 0.7   Immature Granulocyte %      % 0.2 0.1   Glucose      70 - 99 mg/dL 151 (H) 126 (H)   Sodium      136 - 145 mmol/L 137 137   Potassium      3.5 - 5.1 mmol/L 3.7 3.7   Chloride      98 - 112 mmol/L 105 104   Carbon Dioxide, Total      21.0 - 32.0 mmol/L 27.0 29.0   ANION GAP      0 - 18 mmol/L 5 4   BUN      7 - 18 mg/dL 20 (H) 23 (H)   CREATININE      0.70 - 1.30 mg/dL 0.83 0.86   BUN/CREATININE RATIO      10.0 - 20.0 24.1 (H) 26.7 (H)   CALCIUM      8.5 - 10.1 mg/dL 9.3 9.0   CALCULATED OSMOLALITY      275 - 295 mOsm/kg 290 289   eGFR-Cr      >=60 mL/min/1.73m2 97 96   ALT (SGPT)      16 - 61 U/L 40 36   AST (SGOT)      15 - 37 U/L 23 17   ALKALINE PHOSPHATASE      45 - 117 U/L 85 74   Total Bilirubin      0.1 - 2.0 mg/dL 0.4 0.3   PROTEIN, TOTAL      6.4 - 8.2 g/dL 7.7 7.7   Albumin      3.4 - 5.0 g/dL 4.0 3.9   Globulin      2.8 - 4.4 g/dL 3.7 3.8   A/G Ratio      1.0 - 2.0 1.1 1.0   Patient Fasting for CMP?       No No   LDH      87 - 241 U/L 160 188

## 2024-06-13 NOTE — TELEPHONE ENCOUNTER
Patient walked in requesting new Ophthalmologist referral for Dr. Triston Vigil as  has retired and office is requesting a new referral.     Please fax authorized referral to Dr. Vigil.     [address] [facility] [address]

## 2024-06-14 ENCOUNTER — HOSPITAL ENCOUNTER (OUTPATIENT)
Facility: HOSPITAL | Age: 69
Setting detail: HOSPITAL OUTPATIENT SURGERY
Discharge: HOME | End: 2024-06-14
Attending: UROLOGY | Admitting: UROLOGY

## 2024-06-14 ENCOUNTER — ANESTHESIA EVENT (OUTPATIENT)
Dept: SURGERY | Facility: HOSPITAL | Age: 69
End: 2024-06-14
Payer: MEDICARE

## 2024-06-14 ENCOUNTER — ANESTHESIA (OUTPATIENT)
Dept: SURGERY | Facility: HOSPITAL | Age: 69
End: 2024-06-14
Payer: MEDICARE

## 2024-06-14 VITALS
WEIGHT: 182 LBS | DIASTOLIC BLOOD PRESSURE: 82 MMHG | HEIGHT: 71 IN | SYSTOLIC BLOOD PRESSURE: 160 MMHG | RESPIRATION RATE: 14 BRPM | BODY MASS INDEX: 25.48 KG/M2 | HEART RATE: 67 BPM | OXYGEN SATURATION: 99 % | TEMPERATURE: 98 F

## 2024-06-14 PROBLEM — N40.1 BPH WITH OBSTRUCTION/LOWER URINARY TRACT SYMPTOMS: Status: ACTIVE | Noted: 2024-05-28

## 2024-06-14 PROBLEM — N13.8 BPH WITH OBSTRUCTION/LOWER URINARY TRACT SYMPTOMS: Status: ACTIVE | Noted: 2024-05-28

## 2024-06-14 LAB
GLUCOSE BLDC GLUCOMTR-MCNC: 84 MG/DL (ref 70–99)
GLUCOSE BLDC GLUCOMTR-MCNC: 95 MG/DL (ref 70–99)

## 2024-06-14 PROCEDURE — 52442 CYSTO INS TRNSPRSTC IMPLT EA: CPT | Performed by: UROLOGY

## 2024-06-14 PROCEDURE — 52441 CYSTO INSJ TRNSPRSTC 1 IMPLT: CPT | Performed by: UROLOGY

## 2024-06-14 PROCEDURE — 0T7D8DZ DILATION OF URETHRA WITH INTRALUMINAL DEVICE, VIA NATURAL OR ARTIFICIAL OPENING ENDOSCOPIC: ICD-10-PCS | Performed by: UROLOGY

## 2024-06-14 DEVICE — KIT CART HNDL FOR UROLIFT 2: Type: IMPLANTABLE DEVICE | Site: PROSTATE | Status: FUNCTIONAL

## 2024-06-14 DEVICE — CARTRIDGE IMPL PRELD UROLIFT 2 SYS: Type: IMPLANTABLE DEVICE | Site: PROSTATE | Status: FUNCTIONAL

## 2024-06-14 RX ORDER — HYDROMORPHONE HYDROCHLORIDE 1 MG/ML
0.6 INJECTION, SOLUTION INTRAMUSCULAR; INTRAVENOUS; SUBCUTANEOUS EVERY 5 MIN PRN
Status: DISCONTINUED | OUTPATIENT
Start: 2024-06-14 | End: 2024-06-14

## 2024-06-14 RX ORDER — SULFAMETHOXAZOLE AND TRIMETHOPRIM 800; 160 MG/1; MG/1
1 TABLET ORAL 2 TIMES DAILY
Qty: 6 TABLET | Refills: 0 | Status: SHIPPED | OUTPATIENT
Start: 2024-06-14 | End: 2024-06-17

## 2024-06-14 RX ORDER — PHENAZOPYRIDINE HYDROCHLORIDE 100 MG/1
100 TABLET, FILM COATED ORAL 3 TIMES DAILY PRN
Qty: 9 TABLET | Refills: 0 | Status: SHIPPED | OUTPATIENT
Start: 2024-06-14

## 2024-06-14 RX ORDER — NICOTINE POLACRILEX 4 MG
15 LOZENGE BUCCAL
Status: DISCONTINUED | OUTPATIENT
Start: 2024-06-14 | End: 2024-06-14

## 2024-06-14 RX ORDER — MEPERIDINE HYDROCHLORIDE 25 MG/ML
12.5 INJECTION INTRAMUSCULAR; INTRAVENOUS; SUBCUTANEOUS AS NEEDED
Status: DISCONTINUED | OUTPATIENT
Start: 2024-06-14 | End: 2024-06-14

## 2024-06-14 RX ORDER — MORPHINE SULFATE 10 MG/ML
6 INJECTION, SOLUTION INTRAMUSCULAR; INTRAVENOUS EVERY 10 MIN PRN
Status: DISCONTINUED | OUTPATIENT
Start: 2024-06-14 | End: 2024-06-14

## 2024-06-14 RX ORDER — METOCLOPRAMIDE HYDROCHLORIDE 5 MG/ML
10 INJECTION INTRAMUSCULAR; INTRAVENOUS EVERY 8 HOURS PRN
Status: DISCONTINUED | OUTPATIENT
Start: 2024-06-14 | End: 2024-06-14

## 2024-06-14 RX ORDER — ACETAMINOPHEN 500 MG
1000 TABLET ORAL ONCE
Status: COMPLETED | OUTPATIENT
Start: 2024-06-14 | End: 2024-06-14

## 2024-06-14 RX ORDER — NALOXONE HYDROCHLORIDE 0.4 MG/ML
80 INJECTION, SOLUTION INTRAMUSCULAR; INTRAVENOUS; SUBCUTANEOUS AS NEEDED
Status: DISCONTINUED | OUTPATIENT
Start: 2024-06-14 | End: 2024-06-14

## 2024-06-14 RX ORDER — HYDROMORPHONE HYDROCHLORIDE 1 MG/ML
0.2 INJECTION, SOLUTION INTRAMUSCULAR; INTRAVENOUS; SUBCUTANEOUS EVERY 5 MIN PRN
Status: DISCONTINUED | OUTPATIENT
Start: 2024-06-14 | End: 2024-06-14

## 2024-06-14 RX ORDER — PHENAZOPYRIDINE HYDROCHLORIDE 200 MG/1
200 TABLET, FILM COATED ORAL ONCE AS NEEDED
Status: COMPLETED | OUTPATIENT
Start: 2024-06-14 | End: 2024-06-14

## 2024-06-14 RX ORDER — MORPHINE SULFATE 4 MG/ML
4 INJECTION, SOLUTION INTRAMUSCULAR; INTRAVENOUS EVERY 10 MIN PRN
Status: DISCONTINUED | OUTPATIENT
Start: 2024-06-14 | End: 2024-06-14

## 2024-06-14 RX ORDER — SODIUM CHLORIDE 9 MG/ML
INJECTION, SOLUTION INTRAVENOUS CONTINUOUS
Status: DISCONTINUED | OUTPATIENT
Start: 2024-06-14 | End: 2024-06-14

## 2024-06-14 RX ORDER — NICOTINE POLACRILEX 4 MG
30 LOZENGE BUCCAL
Status: DISCONTINUED | OUTPATIENT
Start: 2024-06-14 | End: 2024-06-14

## 2024-06-14 RX ORDER — DEXTROSE MONOHYDRATE 25 G/50ML
50 INJECTION, SOLUTION INTRAVENOUS
Status: DISCONTINUED | OUTPATIENT
Start: 2024-06-14 | End: 2024-06-14

## 2024-06-14 RX ORDER — EPHEDRINE SULFATE 50 MG/ML
INJECTION INTRAVENOUS AS NEEDED
Status: DISCONTINUED | OUTPATIENT
Start: 2024-06-14 | End: 2024-06-14 | Stop reason: SURG

## 2024-06-14 RX ORDER — LIDOCAINE HYDROCHLORIDE 20 MG/ML
JELLY TOPICAL AS NEEDED
Status: DISCONTINUED | OUTPATIENT
Start: 2024-06-14 | End: 2024-06-14 | Stop reason: HOSPADM

## 2024-06-14 RX ORDER — MORPHINE SULFATE 4 MG/ML
2 INJECTION, SOLUTION INTRAMUSCULAR; INTRAVENOUS EVERY 10 MIN PRN
Status: DISCONTINUED | OUTPATIENT
Start: 2024-06-14 | End: 2024-06-14

## 2024-06-14 RX ORDER — SODIUM CHLORIDE, SODIUM LACTATE, POTASSIUM CHLORIDE, CALCIUM CHLORIDE 600; 310; 30; 20 MG/100ML; MG/100ML; MG/100ML; MG/100ML
INJECTION, SOLUTION INTRAVENOUS CONTINUOUS
Status: DISCONTINUED | OUTPATIENT
Start: 2024-06-14 | End: 2024-06-14

## 2024-06-14 RX ORDER — ONDANSETRON 2 MG/ML
4 INJECTION INTRAMUSCULAR; INTRAVENOUS EVERY 6 HOURS PRN
Status: DISCONTINUED | OUTPATIENT
Start: 2024-06-14 | End: 2024-06-14

## 2024-06-14 RX ORDER — HYDROMORPHONE HYDROCHLORIDE 1 MG/ML
0.4 INJECTION, SOLUTION INTRAMUSCULAR; INTRAVENOUS; SUBCUTANEOUS EVERY 5 MIN PRN
Status: DISCONTINUED | OUTPATIENT
Start: 2024-06-14 | End: 2024-06-14

## 2024-06-14 RX ADMIN — EPHEDRINE SULFATE 10 MG: 50 INJECTION INTRAVENOUS at 16:32:00

## 2024-06-14 NOTE — INTERVAL H&P NOTE
Pre-op Diagnosis: BPH with obstruction/lower urinary tract symptoms [N40.1, N13.8]    The above referenced H&P was reviewed by Oswaldo Perry MD on 6/14/2024, the patient was examined and no significant changes have occurred in the patient's condition since the H&P was performed.  I discussed with the patient and/or legal representative the potential benefits, risks and side effects of this procedure; the likelihood of the patient achieving goals; and potential problems that might occur during recuperation.  I discussed reasonable alternatives to the procedure, including risks, benefits and side effects related to the alternatives and risks related to not receiving this procedure.  We will proceed with procedure as planned.

## 2024-06-14 NOTE — ANESTHESIA PREPROCEDURE EVALUATION
Anesthesia PreOp Note    HPI:     Mickey Ramos is a 68 year old male who presents for preoperative consultation requested by: Oswaldo Perry MD    Date of Surgery: 6/14/2024    Procedure(s):  Cystoscopy, Urolift  Indication: BPH with obstruction/lower urinary tract symptoms [N40.1, N13.8]    Relevant Problems   No relevant active problems       NPO:  Last Liquid Consumption Date: 06/14/24  Last Liquid Consumption Time: 0900 (black coffee)  Last Solid Consumption Date: 06/13/24  Last Solid Consumption Time: 1335  Last Liquid Consumption Date: 06/14/24          History Review:  Patient Active Problem List    Diagnosis Date Noted    BPH (benign prostatic hyperplasia) 05/28/2024    Health maintenance examination 05/28/2024    Heart murmur 05/28/2024    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    Age-related cataract of both eyes 08/23/2018    Overweight (BMI 25.0-29.9) 07/18/2017    Former smoker 07/18/2017    Type 2 diabetes mellitus with hyperglycemia, without long-term current use of insulin (HCC) 12/06/2016    History of diffuse large B-cell lymphoma 10/25/2016    Prostate cancer (HCC) 07/18/2015       Past Medical History:    Anesthesia complication    headache post lumbar puncture    BPH (benign prostatic hyperplasia)    Diabetes (HCC)    Diabetes mellitus (HCC)    Diffuse large B-cell lymphoma of solid organ excluding spleen (HCC)    Diverticulosis of large intestine without hemorrhage    Erectile dysfunction due to arterial insufficiency    ESBL (extended spectrum beta-lactamase) producing bacteria infection    High cholesterol    Internal hemorrhoids    Portacath in place    Prostate cancer (HCC)    low volume, [place] 3+3, Dx by Dr. Uriostegui. on observation by Urology    Visual impairment    readers    Vitreous floaters       Past Surgical History:   Procedure Laterality Date    Colonoscopy  2008    [place]    Colonoscopy N/A 04/20/2019    Procedure: COLONOSCOPY;  Surgeon: Harjinder Silvestre MD;   Location: [facility] ENDOSCOPY    Inguinal hernia repair      Port, indwelling, imp      Removal testis,simple Right        Medications Prior to Admission   Medication Sig Dispense Refill Last Dose    glipiZIDE ER 10 MG Oral Tablet 24 Hr Take 1 tablet (10 mg total) by mouth daily. 90 tablet 0 6/13/2024    metFORMIN HCl 1000 MG Oral Tab Take 1 tablet (1,000 mg total) by mouth 2 (two) times daily with meals. 180 tablet 1 6/13/2024    atorvastatin 10 MG Oral Tab Take 1 tablet (10 mg total) by mouth nightly. 90 tablet 0 6/13/2024    tamsulosin 0.4 MG Oral Cap Take 2 capsules (0.8 mg total) by mouth daily. 180 capsule 1 6/13/2024     Current Facility-Administered Medications Ordered in Epic   Medication Dose Route Frequency Provider Last Rate Last Admin    sodium chloride 0.9% infusion   Intravenous Continuous Oswaldo Perry MD 20 mL/hr at 06/14/24 1356 New Bag at 06/14/24 1356    gentamicin (Garamycin) 180 mg in sodium chloride 0.9% 100 mL IVPB  180 mg Intravenous Once Oswaldo Perry MD        cefTRIAXone (Rocephin) 1 g in D5W 100 mL IVPB-ADD  1 g Intravenous Once Oswaldo Perry MD         No current [facility]-ordered outpatient medications on file.       No Known Allergies    Family History   Problem Relation Age of Onset    Heart Disease Mother     Heart Disease Father     Breast Cancer Sister 40    Diabetes Sister     Cancer Sister         Kidney    No Known Problems Sister     No Known Problems Sister     No Known Problems Sister     No Known Problems Brother     No Known Problems Maternal Grandmother     No Known Problems Maternal Grandfather     No Known Problems Paternal Grandmother     No Known Problems Paternal Grandfather     Colon Cancer Neg     Prostate Cancer Neg      Social History     Socioeconomic History    Marital status:     Number of children: 4   Occupational History    Occupation: works for [employer]   Tobacco Use    Smoking status: Former     Current packs/day: 0.00     Average packs/day: 1  pack/day for 47.0 years (47.0 ttl pk-yrs)     Types: Cigarettes     Start date:      Quit date: 2018     Years since quittin.4    Smokeless tobacco: Never   Vaping Use    Vaping status: Never Used   Substance and Sexual Activity    Alcohol use: Not Currently     Comment: occasionally 1-2 drinks on weekends    Drug use: No    Sexual activity: Not Currently     Partners: Female   Other Topics Concern    Caffeine Concern Yes     Comment: Coffee    Exercise No       Available pre-op labs reviewed.  Lab Results   Component Value Date    WBC 5.2 2024    WBC 5.8 2024    RBC 3.87 2024    RBC 4.19 (L) 2024    HGB 12.7 (L) 2024    HGB 13.8 2024    HCT 36.4 (L) 2024    HCT 41.0 2024    MCV 94.1 2024    MCV 97.9 2024    MCH 32.8 2024    MCH 32.9 2024    MCHC 34.9 2024    MCHC 33.7 2024    RDW 12.4 2024    RDW 12.6 2024    .0 2024     2024     Lab Results   Component Value Date     2024     (L) 2024    K 4.0 2024    K 4.5 2024     2024     2024    CO2 28.0 2024    CO2 27 2024    BUN 10 2024    BUN 18 2024    CREATSERUM 0.64 (L) 2024    CREATSERUM 0.72 2024     (H) 2024     (H) 2024    PGLU 84 2024    CA 9.3 2024    CA 9.5 2024     Lab Results   Component Value Date    INR 1.02 2024       Vital Signs:  Body mass index is 25.38 kg/m².   height is 1.803 m (5' 11\") and weight is 82.6 kg (182 lb). His oral temperature is 98 °F (36.7 °C). His blood pressure is 128/73 and his pulse is 55. His respiration is 16 and oxygen saturation is 98%.   Vitals:    24 0905 24 1338   BP:  128/73   Pulse:  55   Resp:  16   Temp:  98 °F (36.7 °C)   TempSrc:  Oral   SpO2:  98%   Weight: 83 kg (183 lb) 82.6 kg (182 lb)   Height: 1.803 m (5' 11\")         Anesthesia  Evaluation     Patient summary reviewed and Nursing notes reviewed    Airway   Mallampati: II  TM distance: >3 FB  Neck ROM: full  Dental      Pulmonary - normal exam    breath sounds clear to auscultation  Cardiovascular - negative ROS and normal exam    Rhythm: regular  Rate: normal    Neuro/Psych - negative ROS     GI/Hepatic/Renal - negative ROS     Endo/Other    (+) diabetes mellitus  Abdominal                  Anesthesia Plan:   ASA:  2  Plan:   General  Airway:  LMA  Post-op Pain Management: IV analgesics  Informed Consent Plan and Risks Discussed With:  Patient      I have informed Mickey Ramos and/or legal guardian or family member of the nature of the anesthetic plan, benefits, risks including possible dental damage if relevant, major complications, and any alternative forms of anesthetic management.   All of the patient's questions were answered to the best of my ability. The patient desires the anesthetic management as planned.  EVA BELLO MD  6/14/2024 4:14 PM  Present on Admission:   BPH (benign prostatic hyperplasia)

## 2024-06-14 NOTE — ANESTHESIA POSTPROCEDURE EVALUATION
Patient: Mickey Ramos    Procedure Summary       Date: 06/14/24 Room / Location: [facility] MAIN OR 14 / [facility] MAIN OR    Anesthesia Start: 1621 Anesthesia Stop:     Procedure: Cystoscopy, Urolift (Bladder) Diagnosis:       BPH with obstruction/lower urinary tract symptoms      (BPH with obstruction/lower urinary tract symptoms [N40.1, N13.8])    Surgeons: Oswaldo Perry MD Anesthesiologist: Johnny Montiel MD    Anesthesia Type: general ASA Status: 2            Anesthesia Type: general    Vitals Value Taken Time   /75 06/14/24 1652   Temp 98.0 06/14/24 1652   Pulse 71 06/14/24 1652   Resp 11 06/14/24 1652   SpO2 97 % 06/14/24 1652   Vitals shown include unfiled device data.    [facility] AN Post Evaluation:   Patient Evaluated in PACU  Patient Participation: complete - patient participated  Level of Consciousness: awake and alert  Pain Score: 2  Pain Management: adequate  Airway Patency:patent  Yes    Nausea/Vomiting: none  Cardiovascular Status: acceptable  Respiratory Status: acceptable  Postoperative Hydration acceptable      Paloma Olivera MD, PhD  6/14/2024 4:52 PM

## 2024-06-14 NOTE — OPERATIVE REPORT
[facility]  part of [facility]  Urology Operative Note         Mickey Ramos Location: OR   [facility] [id number] MRN [id number]   Admission Date 6/14/2024 Operation Date 6/14/2024   Attending Physician Oswaldo Perry MD       Patient Name: Mickey Ramos     Preoperative Diagnosis: BPH with obstruction/lower urinary tract symptoms [N40.1, N13.8]     Postoperative Diagnosis: BPH with obstruction/lower urinary tract symptoms [N40.1, N13.8]     Procedure(s): Cystoscopy, UroLift.    Primary Surgeon: Oswaldo Perry MD     Anesthesia: General LMA.     Specimen: * No specimens in log *     Estimated Blood Loss: 1 mL.   Complications: None.     Indications for procedure: Patient is a 68-year-old male with BPH and bothersome lower urinary tract symptoms refractory to medical therapy with tamsulosin.  Management options were discussed with the patient who elected to proceed with the above-mentioned procedure.  Procedure was discussed in detail including rationale, approach, benefits, risks, possible complications, and reasonable alternatives of treatment.  The expected postoperative course of recovery was reviewed.  We discussed surgical risks including but not limited to medical and anesthetic complications, bleeding, infection, damage to surrounding organs or structures, need for additional procedures, minimal risk of ejaculatory and erectile dysfunction with UroLift, catheterization rates, retreatment rates, as well as possible failure of the procedure to alleviate his symptoms.      He verbalized understanding and wishes to proceed.     Surgical Findings: No modification of the intended routine. 5 UroLift implants deployed, creating a contiguous anterior prostatic urethral voiding channel.  Hemostasis satisfactory.    Operative Summary:  The patient was brought to the operating room and identified including their name, medical record number, and date of birth. They were transferred to the  operating room table. Appropriate monitoring devices were connected to the patient. SCD's were applied to the bilateral lower extremities for DVT prophylaxis.  General LMA anesthesia was achieved without difficulty. IV antibiotics were administered for surgical prophylaxis. The patient was then positioned in dorsal lithotomy with their legs in Les stirrups and all pressure areas and bony prominences padded appropriately. The surgical site was prepped and draped in standard sterile fashion. A full surgical timeout was performed with agreement upon all of its components.     10 cc of 2% lidocaine jelly were instilled per urethra and allowed to dwell for a few minutes. A 20 Polish cystoscope was inserted into the bladder. The cystoscopy bridge was replaced with a UroLift delivery device. The first treatment site was the patient's left side approximately 1.5 cm distal to the bladder neck. The distal tip of the delivery device was then angled laterally approximately 20 degrees at this position to compress the lateral lobe.  The trigger was pulled, thereby deploying a needle containing the implant through the prostate. The needle was then retracted, allowing one end of the implant to be delivered to the capsular surface of the prostate.  The implant was then tensioned.  The device was then angled back toward midline and slowly advanced proximally until cystoscopic verification of the monofilament being centered in the delivery bay. The urethral end piece was then affixed to the monofilament thereby tailoring the size of the implant.  Excess filament was then severed. The delivery device was then readvanced into the bladder.  The delivery device was then replaced with a cystoscopic bridge and the implant location and opening effect was confirmed cystoscopically.  The same procedure was then repeated on the right side with the same technique. 5 total implants were deployed (2 on the patient's left and 3 on the patient's  right) to create a contiguous anterior prostatic urethral channel. The distalmost implants were delivered just proximal to the verumontanum. Cystoscopy then revealed a continuous anterior channel through the prostatic urethra. Hemostasis was Satisfactory. All instruments were removed. A Munoz catheter was not inserted.     This concluded our procedure.  Patient was repositioned supine, general anesthesia was reversed and he was successfully extubated and transported to the recovery room in a stable condition.  He tolerated the procedure well without any immediate complications.     Implants:   Implant Name Type Inv. Item Serial No.  Lot No. LRB No. Used Action   CARTRIDGE IMPL PRELD UROLIFT 2 SYS - SN/A  CARTRIDGE IMPL PRELD UROLIFT 2 SYS N/A [employer] WD [id number] N/A 4 Implanted   KIT CART HNDL FOR UROLIFT 2 - SN/A  KIT CART HNDL FOR UROLIFT 2 N/A [employer] WD [id number] N/A 1 Implanted        Drains: None.      Condition: Extubated and stable to PACU.     I was present, scrubbed, and performed the procedure in its entirety.  At patient's request, findings were discussed with his family following the procedure.      Oswaldo Perry MD

## 2024-06-20 ENCOUNTER — TELEPHONE (OUTPATIENT)
Dept: INTERNAL MEDICINE CLINIC | Facility: CLINIC | Age: 69
End: 2024-06-20

## 2024-06-20 NOTE — TELEPHONE ENCOUNTER
Please push referral [id number] through patient had his appointment cancel today due to referral not being ready. Message routed to manage care to expedite.

## 2024-06-24 ENCOUNTER — TELEPHONE (OUTPATIENT)
Dept: SURGERY | Facility: CLINIC | Age: 69
End: 2024-06-24

## 2024-06-24 DIAGNOSIS — R39.11 URINARY HESITANCY: ICD-10-CM

## 2024-06-24 NOTE — TELEPHONE ENCOUNTER
Patient is requesting a refill on the tamsulosin. Please send to [facility] in [place]. Please advise.       Current Outpatient Medications:       tamsulosin 0.4 MG Oral Cap, Take 2 capsules (0.8 mg total) by mouth daily., Disp: 180 capsule, Rfl: 1

## 2024-06-25 RX ORDER — TAMSULOSIN HYDROCHLORIDE 0.4 MG/1
0.8 CAPSULE ORAL DAILY
Qty: 180 CAPSULE | Refills: 3 | Status: SHIPPED | OUTPATIENT
Start: 2024-06-25

## 2024-06-25 NOTE — TELEPHONE ENCOUNTER
LOV 6/5/24  PLAN:  1.  Continue active surveillance of low risk prostate cancer.  Follow-up in 6 months with a PSA.     2.  Continue tadalafil as needed for management of erectile dysfunction.  Prescribed by PCP.     3.  Continue tamsulosin 0.4 mg daily for management of BPH with LUTS.     4.  Schedule for cystoscopy and UroLift.        Oswaldo Perry MD  6/5/2024      Benign Prostatic Hypertrophy Medications      Protocol Criteria:  Appointment scheduled in the past 12 months or in the next 2 months  If patients is between the ages of 50 and 70 then PSA done within the last 2 years and is either  Less than or equal to 4.0 ng/ml  Or if greater than 4.0 there is no significant increase (>0.5 ng/ml) in PSA over the last 2 years    Recent Outpatient Visits              1 week ago History of diffuse large B-cell lymphoma    [facility]    Nurse Only    1 week ago History of diffuse large B-cell lymphoma    [facility] Akin Curry MD    Office Visit    [facility] weeks [facility]    3 weeks ago [facility]    Office Visit    4 weeks ago Controlled type 2 diabetes mellitus with complication, without long-term current use of insulin (HCC)    [facility]          Future Appointments         Provider Department Appt Notes    In 11 months CMA RESOURCE [facility] cbc,cmp,ldh    In 11 months Akin Curry MD [facility] FOLLOW UP [email or web address]  1Y        PSA:    Lab Results   Component Value Date    PSA 2.93 05/03/2023    PSA 3.22 08/02/2021    PSA 3.89 09/11/2020    PSA 4.09 (H) 04/21/2020    PSA 4.50 (H) 11/10/2019    PSA 4.24 (H) 08/24/2019     PSA  Screen:  No results found for: \"PSAS\"    I sent a refill of the Tamsulosin 2 caps for a 1 yr supply.

## 2024-07-02 ENCOUNTER — MED REC SCAN ONLY (OUTPATIENT)
Dept: INTERNAL MEDICINE CLINIC | Facility: CLINIC | Age: 69
End: 2024-07-02

## 2024-10-07 ENCOUNTER — TELEPHONE (OUTPATIENT)
Dept: INTERNAL MEDICINE CLINIC | Facility: CLINIC | Age: 69
End: 2024-10-07

## 2024-10-07 NOTE — TELEPHONE ENCOUNTER
Unable to reach patient for medication adherence consult via  ID# [id number]. LVM for patient to call me back at [phone].

## 2025-04-30 NOTE — PROGRESS NOTES
FAMILY MEDICINE CLINIC NOTE - MEDICARE    HPI  Mickey Ramos is a 69 year old male presenting for a MA AHA (Medicare Advantage Annual Health Assessment) and Medicare Subsequent Annual Wellness visit (Pt already had Initial Annual Wellness).    #Health Maintenance  -Diet: Trying to eat a little healthier.   -Exercise: Exercising regularly - running and lifting weights. Was running in [place]  -Lung cancer screen: Indicated  -Colon cancer screen: Indicated - 4/2019 Dr Silvestre, repeat in 5 years   -Prostate cancer screen: - History of prostate cancer, last PSA 5/29/24  -Aortic aneurysm screen: Indicated  -Statin:  - 5/29/24  -ASA: Not indicated  -HIV screen: Not indicated  -Hep C screen: - 5/29/24  -Gonorrhea/chlamydia:  Not indicated  -Syphillis: Not indicated  -TB: Not indicated  -Tobacco/alcohol: Per below        #Immunizations  -Tdap:  Medicare  -Flu shot: Not indicated - not season  -PCV13: Not indicated   -PCV20:  5/28/24    -PPSV23:  8/28/19    -RZV (preferred) or VZL: Indicated   -RSV: Indicated   -COVID: Indicated     #DM  -Hba1c: 11.3 5/29/24 , 6.7 5/2025  -Microalbuminuria: 5/29/24  -B12: Consider future screen  -Pneumonia vaccine: Prevnar 23 8/2019, Prevnar 20 5/28/24  -HepB: Consider future screen  -Eye exam: No diabetic retinopathy bilaterally - Dr Ronal Vigil 6/27/24  -Diabetic foot exam: 5/1/25 Bilateral barefoot skin diabetic exam is normal, visualized feet and the appearance is normal. Bilateral monofilament/sensation of both feet is normal. Pulsation pedal pulse exam of both lower legs/feet is normal as well.  -Current medications: Metformin 1000 mg twice daily.  -Statin: Started on atorvastatin 10 mg nightly   -Blood sugars:  -AM:     -Noon:   -PM:      -hypoglycemia:        #History of large B-Cell lymphoma  #Testicular lymphoma   -hematology Dr Curry     #Cataracts  -ophthalmology Dr Vigil     #Prostate cancer  #BPH  #ED   -urology Dr Perry  -cystoscopy, urolift  6/2024  -reports improvement  with urination    #Insomnia  #Anxiety  -notes ongoing stress  -marriage stress - left him  -using clonazepam from [place] to help sleep  -denies depression  -no SI/HI  -open to starting medication    #Additional screenings  History/Other:   Fall Risk Assessment:   He has been screened for Falls and is low risk.      Cognitive Assessment:   He had a completely normal cognitive assessment - see flowsheet entries       Functional Ability/Status:   Mickey Ramos has a completely normal functional assessment. See flowsheet for details.      Depression Screening (PHQ-2/PHQ-9): PHQ-2 SCORE: 0  , done 5/1/2025        Advanced Directives:   He does NOT have a Living Will. [Do you have a living will?: No]  He does NOT have a Power of  for Health Care. [Do you have a healthcare power of ?: No]  Discussed Advance Care Planning with patient (and family/surrogate if present). Standard forms made available to patient in After Visit Summary.    #Patient Care Team  Patient Care Team:  Lavon Prieto MD as PCP - General (Family Medicine)  Seb Massey MD as Referring Physician (Family Practice)  Kalpesh Pittman MD (Radiation Oncology)  Bryant Tijerina MD (Radiation Oncology)  Johnny Montiel MD as Consulting Physician (Anesthesiology)  Won Linares MD as Consulting Physician (NEUROLOGY)  Akin Curry MD as Referring Physician (Hematology and Oncology)  Oswaldo Perry MD (UROLOGY)      ROS  GENERAL: No fever/chills, no recent weight loss   HEENT: No visual changes, no changes in hearing, no sore throats  NECK: No pain, no swelling  RESP: No cough, no SOB  CV: No chest pain, no palpitations  GI: No abd pain, no N/V/D  MSK: No edema, no pain  SKIN: No new rashes  NEURO: No numbness, no tingling, no HA    HEALTH MAINTENANCE CHECKLIST  Health Maintenance Topics with due status: Overdue       Topic Date Due    Zoster Vaccines Never done    Lung Cancer Screening Never done    Colorectal Cancer  Screening 2024    Diabetes Care A1C 2024    COVID-19 Vaccine 2024    Annual Well Visit 2025    Annual Depression Screening 2025    Fall Risk Screening (Annual) 2025    Diabetes Care: Foot Exam (Annual) 2025    Diabetes Care: Microalb/Creat Ratio (Annual) 2025     Health Maintenance Topics with due status: Due Soon       Topic Date Due    Diabetes Care Dilated Eye Exam 2025       ALLERGIES  Allergies[1]    MEDICATIONS  Current Medications[2]    ACTIVE PROBLEM  Problem List[3]    PAST MEDICAL HISTORY  Past Medical History[4]    PAST SOCIAL HISTORY  Social History     Socioeconomic History    Marital status:      Spouse name: Not on file    Number of children: 4    Years of education: Not on file    Highest education level: Not on file   Occupational History    Occupation: works for [employer]   Tobacco Use    Smoking status: Former     Current packs/day: 0.00     Average packs/day: 1 pack/day for 47.0 years (47.0 ttl pk-yrs)     Types: Cigarettes     Start date:      Quit date:      Years since quittin.3    Smokeless tobacco: Never   Vaping Use    Vaping status: Never Used   Substance and Sexual Activity    Alcohol use: Yes     Comment: occasionally 1-2 drinks on weekends    Drug use: No    Sexual activity: Not Currently     Partners: Female   Other Topics Concern     Service Not Asked    Blood Transfusions Not Asked    Caffeine Concern Yes     Comment: Coffee    Occupational Exposure Not Asked    Hobby Hazards Not Asked    Sleep Concern Not Asked    Stress Concern Not Asked    Weight Concern Not Asked    Special Diet Not Asked    Back Care Not Asked    Exercise No    Bike Helmet Not Asked    Seat Belt Not Asked    Self-Exams Not Asked   Social History Narrative    Relationships:  - Roz    Children: Marcia (with Roz). 3 other kids - Sugey Ibrahim Robert.     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 Now occasionally in [place] - working in [place].     Origin: From [place], came to [place] in [place]    Interests: Enjoys exercising     Spiritual: Not Alevism, not spiritual         Social Drivers of Health     Food Insecurity: Not on file   Transportation Needs: Not on file   Stress: Not on file   Housing Stability: Not on file       CAGE Alcohol Screen:   He has been screened for alcohol abuse and his score is not 0:  Cut: Have you ever felt you should Cut down on your drinking?: Yes  Annoyed: Have people Annoyed you by criticizing your drinking?: No  Guilty: Have you ever felt bad or Guilty about your drinking?: Yes  Eye Opener: Have you ever had a drink first thing in the morning to steady your nerves or to get rid of a hangover (Eye opener)?: No  Total Score: 2          PAST SURGICAL HISTORY  Past Surgical History[5]    PAST FAMILY HISTORY  Family History[6]    PHYSICAL EXAM  Vitals:    05/01/25 0957   BP: 132/86   Pulse: 67   Temp: 97.6 °F (36.4 °C)   SpO2: 98%   Weight: 175 lb (79.4 kg)   Height: 5' 11\" (1.803 m)      Body mass index is 24.41 kg/m².    Medicare Hearing Assessment:  Hearing Screening    Time taken: 5/1/2025 10:34 AM  Entry User: Lavon Prieto MD  Screening Method: Finger Rub  Finger Rub Result: Pass       Visual Acuity:   Visual Acuity:   Right Eye Visual Acuity: Uncorrected     Left Eye Visual Acuity: Uncorrected     Both Eyes Visual Acuity: Uncorrected Both Eyes Chart Acuity: 20/40   Able To Tolerate Visual Acuity: Yes        GENERAL: NAD  HEENT: Moist mucous membranes, no tonsillar swelling or erythema, PERRLA bilat, TM translucent and non-bulging  NECK: Supple, non-tender  RESP: CTAB, no wheezing, no rales, no rhonchi  CV: RRR, 2/6 systolic murmur  GI: Soft, non-distended, non-tender, no guarding, no rebound, no masses  MSK: No edema. Bilateral barefoot skin diabetic exam is normal, visualized feet and the appearance is normal. Bilateral  monofilament/sensation of both feet is normal. Pulsation pedal pulse exam of both lower legs/feet is normal as well.  SKIN: Warm and dry, no rashes  NEURO: Answering questions appropriately    LABS    Lab Results   Component Value Date    WBC 5.2 06/13/2024    HGB 12.7 (L) 06/13/2024    .0 06/13/2024       Lab Results   Component Value Date    AST 17 06/13/2024    ALT 21 06/13/2024    CA 9.3 06/13/2024    ALB 4.3 06/13/2024    CREATSERUM 0.64 (L) 06/13/2024     (H) 06/13/2024       Lab Results   Component Value Date    CHOLEST 162 05/28/2024    HDL 46 05/28/2024    LDL 85 05/28/2024    TRIG 211 (H) 05/28/2024         DIAGNOSTICS    ASSESSMENT/PLAN  Problem List Items Addressed This Visit          HCC Problems    Type 2 diabetes mellitus with hyperglycemia, without long-term current use of insulin (HCC) - Primary    Patient with diabetes.  Diabetes is now controlled.   Continue metformin 1000 mg twice daily  Continue atorvastatin 10 mg nightly.  See ophthalmology for routine eye exam this year  Monitor urine microalbumin, CMP, lipid panel  Follow-up in 6 months.         Relevant Medications    metFORMIN HCl 1000 MG Oral Tab    atorvastatin 10 MG Oral Tab    Blood Glucose Monitoring Suppl (ACCU-CHEK GUIDE ME) w/Device Does not apply Kit    Glucose Blood (ACCU-CHEK GUIDE TEST) In Vitro Strip    Accu-Chek Softclix Lancets Does not apply Misc    Other Relevant Orders    POC Glycohemoglobin [50804]    Comp Metabolic Panel (14)    Microalb/Creat Ratio, Random Urine    Vitamin B12    Hepatitis B Surface Antibody    OPHTHALMOLOGY - EXTERNAL       Cardiac and Vasculature    Heart murmur    Systolic murmur auscultated.  Check echo.         Relevant Orders    CARD ECHO 2D DOPPLER (CPT=93306)       Mental Health    Anxiety    Patient reports ongoing anxiety insomnia  He has clonazepam prescribed from [place] which he uses intermittently-advised to use this sparingly.  Can use melatonin as needed instead.  Sleep  hygiene discussed in the office.  Consider therapy-however this is difficult as he is frequently in other states and countries.  He is interested in starting medication.  Start escitalopram 5 mg daily.  Can follow-up with me in 1 month if desired         Relevant Medications    escitalopram 5 MG Oral Tab       Genitourinary and Reproductive    BPH with obstruction/lower urinary tract symptoms    Follows with urology  Reports improvement now         Relevant Orders    UROLOGY - INTERNAL       Hematology and Neoplasia    History of diffuse large B-cell lymphoma    Patient with a history of diffuse large B-cell lymphoma, lymphoma testis  Continue to follow with hematology         Relevant Orders    OP REFERRAL TO HEMATOLOGY/ONCOLOGY GROUP    History of prostate cancer    Patient with a history of prostate cancer  Follows with urology            Eye    Age-related cataract of both eyes    Continue to follow with ophthalmology             Tobacco    Former smoker    CT lung screen ordered  AAA ultrasound         Relevant Orders    CT LUNG LD SCREENING(CPT=71271)    US ABDOMINAL AORTIC ANEURYSM SCREENING (CPT=76706)       Health Encounters    Health maintenance examination    Exercise and diet advised.  CBC, CMP, lipid panel, Hba1c, TSH,  PSA  Shingles vaccine - advised to complete at pharmacy   COVID vaccine advised.  Advanced directive information provided.  Colonoscopy referral provided.  CT lung screen ordered.  AAA screen ordered.           Relevant Orders    CBC With Differential With Platelet    Comp Metabolic Panel (14)    Lipid Panel    CT LUNG LD SCREENING(CPT=71271)    US ABDOMINAL AORTIC ANEURYSM SCREENING (CPT=76706)     Other Visit Diagnoses         Colon cancer screening        Relevant Orders    GASTRO - INTERNAL            Return in about 6 months (around 11/1/2025) for follow up.    Lavon Prieto MD  Family Medicine    4/30/2025         Supplementary Documentation:   General Health:  In the past six  months, have you lost more than 10 pounds without trying?: 2 - No  Has your appetite been poor?: No  Type of Diet: Balanced, Low Carb  How does the patient maintain a good energy level?: Appropriate Exercise, Daily Walks  How would you describe your daily physical activity?: Moderate  How would you describe your current health state?: Good  How do you maintain positive mental well-being?: Puzzles, Visiting Family  On a scale of 0 to 10, with 0 being no pain and 10 being severe pain, what is your pain level?: 0 - (None)  In the past six months, have you experienced urine leakage?: 0-No  At any time do you feel concerned for the safety/well-being of yourself and/or your children, in your home or elsewhere?: No  Have you had any immunizations at another office such as Influenza, Hepatitis B, Tetanus, or Pneumococcal?: No        Mickey Ramos's SCREENING SCHEDULE   Tests on this list are recommended by your physician but may not be covered, or covered at this frequency, by your insurer.   Please check with your insurance carrier before scheduling to verify coverage.   PREVENTATIVE SERVICES FREQUENCY &  COVERAGE DETAILS LAST COMPLETION DATE   Diabetes Screening    Fasting Blood Sugar / Glucose    One screening every 12 months if never tested or if previously tested but not diagnosed with pre-diabetes   One screening every 6 months if diagnosed with pre-diabetes Lab Results   Component Value Date     (H) 06/13/2024        Cardiovascular Disease Screening    Lipid Panel  Cholesterol  Lipoprotein (HDL)  Triglycerides Covered every 5 years for all Medicare beneficiaries without apparent signs or symptoms of cardiovascular disease Lab Results   Component Value Date    CHOLEST 162 05/28/2024    HDL 46 05/28/2024    LDL 85 05/28/2024    TRIG 211 (H) 05/28/2024         Electrocardiogram (EKG)   Covered if needed at Welcome to Medicare, and non-screening if indicated for medical reasons 09/21/2016      Ultrasound  Screening for Abdominal Aortic Aneurysm (AAA) Covered once in a lifetime for one of the following risk factors    Men who are 65-75 years old and have ever smoked    Anyone with a family history -     Colorectal Cancer Screening  Covered for ages 50-85; only need ONE of the following:    Colonoscopy   Covered every 10 years    Covered every 2 years if patient is at high risk or previous colonoscopy was abnormal 04/20/2019    Health Maintenance   Topic Date Due    Colorectal Cancer Screening  04/20/2024       Flexible Sigmoidoscopy   Covered every 4 years -    Fecal Occult Blood Test Covered annually -   Prostate Cancer Screening    Prostate-Specific Antigen (PSA) Annually Lab Results   Component Value Date    PSA 2.93 05/03/2023     Health Maintenance   Topic Date Due    PSA  05/28/2026      Immunizations    Influenza Covered once per flu season  Please get every year -  No recommendations at this time    Pneumococcal Each vaccine ([id number] & [id number]) covered once after 65 Prevnar 13: -    [id number]: 08/28/2019     No recommendations at this time    Hepatitis B One screening covered for patients with certain risk factors   -  No recommendations at this time    Tetanus Toxoid Not covered by Medicare Part B unless medically necessary (cut with metal); may be covered with your pharmacy prescription benefits -    Tetanus, Diptheria and Pertusis TD and TDaP Not covered by Medicare Part B -  No recommendations at this time    Zoster Not covered by Medicare Part B; may be covered with your pharmacy  prescription benefits -  Zoster Vaccines(1 of 2) Never done     Diabetes      Hemoglobin A1C Annually; if last result is elevated, may be asked to retest more frequently.    Medicare covers every 3 months Lab Results   Component Value Date     (H) 05/03/2023    A1C 11.3 (H) 05/28/2024       Hemoglobin A1C Test due on 08/28/2024    Creat/alb ratio Annually Lab Results   Component Value Date    MICROALBCREA   09/21/2022      Comment:      Unable to calculate due to Urine Creatinine <13.00 mg/dL    Unable to calculate due to Urine Microalbumin <0.5 mg/dL         LDL Annually Lab Results   Component Value Date    LDL 85 05/28/2024       Dilated Eye Exam Annually Last Diabetic Eye Exam:  Last Dilated Eye Exam: 06/27/24  Eye Exam shows Diabetic Retinopathy?: No               [1] No Known Allergies  [2]   Current Outpatient Medications   Medication Sig Dispense Refill    escitalopram 5 MG Oral Tab Take 1 tablet (5 mg total) by mouth daily. 90 tablet 3    metFORMIN HCl 1000 MG Oral Tab Take 1 tablet (1,000 mg total) by mouth 2 (two) times daily with meals. 180 tablet 1    atorvastatin 10 MG Oral Tab Take 1 tablet (10 mg total) by mouth nightly. 90 tablet 3    Blood Glucose Monitoring Suppl (ACCU-CHEK GUIDE ME) w/Device Does not apply Kit 1 each daily. Check blood sugars once as prescribed. 1 kit 0    Glucose Blood (ACCU-CHEK GUIDE TEST) In Vitro Strip Please check blood sugars daily 100 each 3    Accu-Chek Softclix Lancets Does not apply Misc 1 Lancet by Finger stick route daily. Use as directed. 100 each 3   [3]   Patient Active Problem List  Diagnosis    History of diffuse large B-cell lymphoma    History of prostate cancer    Type 2 diabetes mellitus with hyperglycemia, without long-term current use of insulin (HCC)    Former smoker    Age-related cataract of both eyes    BPH with obstruction/lower urinary tract symptoms    Health maintenance examination    Heart murmur    Anxiety   [4]   Past Medical History:   Anesthesia complication    headache post lumbar puncture    BPH (benign prostatic hyperplasia)    Diabetes (HCC)    Diabetes mellitus (HCC)    Diffuse large B-cell lymphoma of solid organ excluding spleen    Diverticulosis of large intestine without hemorrhage    Erectile dysfunction due to arterial insufficiency    ESBL (extended spectrum beta-lactamase) producing bacteria infection    High cholesterol    Internal  hemorrhoids    Portacath in place    Prostate cancer (HCC)    low volume, Moss 3+3, Dx by Dr. Uriostegui. on observation by Urology    Visual impairment    readers    Vitreous floaters   [5]   Past Surgical History:  Procedure Laterality Date    Colonoscopy  2008    [place]    Colonoscopy N/A 04/20/2019    Procedure: COLONOSCOPY;  Surgeon: Harjinder Silvestre MD;  Location: [facility] ENDOSCOPY    Cysto insertion transprostatic implant single Bilateral 06/14/2024    UroLift; 5 implants.    Inguinal hernia repair      Port, indwelling, imp      Removal testis,simple Right    [6]   Family History  Problem Relation Age of Onset    Heart Disease Mother     Heart Disease Father     Breast Cancer Sister 40    Diabetes Sister     Cancer Sister         Kidney    No Known Problems Sister     No Known Problems Sister     No Known Problems Sister     No Known Problems Brother     No Known Problems Maternal Grandmother     No Known Problems Maternal Grandfather     No Known Problems Paternal Grandmother     No Known Problems Paternal Grandfather     Colon Cancer Neg     Prostate Cancer Neg

## 2025-05-01 ENCOUNTER — OFFICE VISIT (OUTPATIENT)
Dept: INTERNAL MEDICINE CLINIC | Facility: CLINIC | Age: 70
End: 2025-05-01
Payer: MEDICARE

## 2025-05-01 VITALS
HEIGHT: 71 IN | OXYGEN SATURATION: 98 % | HEART RATE: 67 BPM | BODY MASS INDEX: 24.5 KG/M2 | TEMPERATURE: 98 F | DIASTOLIC BLOOD PRESSURE: 86 MMHG | WEIGHT: 175 LBS | SYSTOLIC BLOOD PRESSURE: 132 MMHG

## 2025-05-01 DIAGNOSIS — Z00.00 HEALTH MAINTENANCE EXAMINATION: ICD-10-CM

## 2025-05-01 DIAGNOSIS — Z85.46 HISTORY OF PROSTATE CANCER: ICD-10-CM

## 2025-05-01 DIAGNOSIS — N13.8 BPH WITH OBSTRUCTION/LOWER URINARY TRACT SYMPTOMS: ICD-10-CM

## 2025-05-01 DIAGNOSIS — H25.9 AGE-RELATED CATARACT OF BOTH EYES, UNSPECIFIED AGE-RELATED CATARACT TYPE: ICD-10-CM

## 2025-05-01 DIAGNOSIS — R01.1 HEART MURMUR: ICD-10-CM

## 2025-05-01 DIAGNOSIS — Z12.11 COLON CANCER SCREENING: ICD-10-CM

## 2025-05-01 DIAGNOSIS — N40.1 BPH WITH OBSTRUCTION/LOWER URINARY TRACT SYMPTOMS: ICD-10-CM

## 2025-05-01 DIAGNOSIS — Z85.72 HISTORY OF DIFFUSE LARGE B-CELL LYMPHOMA: ICD-10-CM

## 2025-05-01 DIAGNOSIS — E11.65 TYPE 2 DIABETES MELLITUS WITH HYPERGLYCEMIA, WITHOUT LONG-TERM CURRENT USE OF INSULIN (HCC): Primary | ICD-10-CM

## 2025-05-01 DIAGNOSIS — C61 PROSTATE CANCER (HCC): ICD-10-CM

## 2025-05-01 DIAGNOSIS — Z87.891 FORMER SMOKER: ICD-10-CM

## 2025-05-01 DIAGNOSIS — F41.9 ANXIETY: ICD-10-CM

## 2025-05-01 PROBLEM — E66.3 OVERWEIGHT (BMI 25.0-29.9): Status: RESOLVED | Noted: 2017-07-18 | Resolved: 2025-05-01

## 2025-05-01 RX ORDER — ESCITALOPRAM OXALATE 5 MG/1
5 TABLET ORAL DAILY
Qty: 90 TABLET | Refills: 3 | Status: SHIPPED | OUTPATIENT
Start: 2025-05-01

## 2025-05-01 RX ORDER — ATORVASTATIN CALCIUM 10 MG/1
10 TABLET, FILM COATED ORAL NIGHTLY
Qty: 90 TABLET | Refills: 3 | Status: SHIPPED | OUTPATIENT
Start: 2025-05-01

## 2025-05-01 RX ORDER — LANCETS
1 EACH MISCELLANEOUS DAILY
Qty: 100 EACH | Refills: 3 | Status: SHIPPED | OUTPATIENT
Start: 2025-05-01 | End: 2026-05-01

## 2025-05-01 RX ORDER — BLOOD-GLUCOSE METER
1 EACH MISCELLANEOUS DAILY
Qty: 1 KIT | Refills: 0 | Status: SHIPPED | OUTPATIENT
Start: 2025-05-01

## 2025-05-01 RX ORDER — BLOOD SUGAR DIAGNOSTIC
STRIP MISCELLANEOUS
Qty: 100 EACH | Refills: 3 | Status: SHIPPED | OUTPATIENT
Start: 2025-05-01

## 2025-05-01 NOTE — PATIENT INSTRUCTIONS
PATIENT INSTRUCTIONS    Thank you for seeing me today, it was a pleasure taking care of you.  Please check out at the  and schedule a follow up appointment.  Return in about 6 months (around 11/1/2025) for follow up.  Please remember that the preferred charlene period for appointments is 5 minutes. This is to help maximize the amount of time that we can spend together at our visits.    Please get your labs drawn at your preferred lab.  The following imaging studies were ordered: Lung CT, AAA ultrasound, echo of heart  Please call [phone] to schedule your imaging appointment.   Please also follow up with the following specialists: Hematology, Urology, GI - colonoscopy, Ophthalmology   Provider Address Phone   Harjinder Silvestre MD [address]  [facility] [phone]     Provider Address Phone   Akin Curry  [address] [facility] [address] [phone]     Provider Address Phone   Ronal Vigil MD [address] [address] [phone]        Provider Address Phone   Oswaldo Perry MD [address]  [facility] [phone]       Please fill out the advance directive information (power of  documents) and bring a copy to our clinic.  Shingles vaccine - complete at pharmacy  Try not to use clonazepam too much  Melatonin as needed for sleep  Start escitalopram 5 mg daily; can see me in 1 month if desiring dosage adjustment      Best,   Dr. Prieto    [facility] LABS INFORMATION    Here are some lab locations available to you. Please call and make a lab appointment. Please note that some of the times and availabilities are subject to change. Please refer to the [facility] webpage for the most recent updates.    [place]  [address] [place] [address] [phone]  Monday-Friday: 7:30 AM-3:30 PM  Saturday: 7:30 AM-12 PM    [place]  [address], [place], [place] [id number] [phone]  Monday-Friday: 7:30 AM-3:30 PM    [place] [address], [place] [address]  [phone]  Monday-Friday: 8 AM-4 PM  Saturday: 8 AM-1 PM    [place]  [address], [place] [address]  [phone]  Monday-Friday: 7 AM-3 PM  Saturday: 7 AM-12 PM    [place]  [address], [place], [place] [id number] [phone]  Monday-Friday: 7 AM-3 PM  Saturday: 8 AM-1 PM    [place]  [address] [place], [place] [address] [place], [place] [address] [phone]  Monday-Friday: 6:30 AM-4 PM  Saturday: 6:30 AM-12 PM    [facility]  [address], [place], [place] [address] [phone]  Monday-Friday: 7 AM-3 PM  Saturday: 7:30 AM-12:30 PM    [place] ([facility]  [address], [place], [place] [id number] [phone]  Monday-Friday: 8 AM-4 PM  Saturday: 7 AM-12 PM

## 2025-05-01 NOTE — ASSESSMENT & PLAN NOTE
Patient reports ongoing anxiety insomnia  He has clonazepam prescribed from [place] which he uses intermittently-advised to use this sparingly.  Can use melatonin as needed instead.  Sleep hygiene discussed in the office.  Consider therapy-however this is difficult as he is frequently in other states and [place].  He is interested in starting medication.  Start escitalopram 5 mg daily.  Can follow-up with me in 1 month if desired

## 2025-05-01 NOTE — ASSESSMENT & PLAN NOTE
Exercise and diet advised.  CBC, CMP, lipid panel, Hba1c, TSH,  PSA  Shingles vaccine - advised to complete at pharmacy   COVID vaccine advised.  Advanced directive information provided.  Colonoscopy referral provided.  CT lung screen ordered.  AAA screen ordered.

## 2025-05-01 NOTE — ASSESSMENT & PLAN NOTE
Patient with diabetes.  Diabetes is now controlled.   Continue metformin 1000 mg twice daily  Continue atorvastatin 10 mg nightly.  See ophthalmology for routine eye exam this year  Monitor urine microalbumin, CMP, lipid panel  Follow-up in 6 months.

## 2025-05-09 ENCOUNTER — OFFICE VISIT (OUTPATIENT)
Facility: CLINIC | Age: 70
End: 2025-05-09

## 2025-05-09 ENCOUNTER — TELEPHONE (OUTPATIENT)
Facility: CLINIC | Age: 70
End: 2025-05-09

## 2025-05-09 VITALS
SYSTOLIC BLOOD PRESSURE: 131 MMHG | DIASTOLIC BLOOD PRESSURE: 68 MMHG | HEIGHT: 71 IN | HEART RATE: 74 BPM | WEIGHT: 170 LBS | BODY MASS INDEX: 23.8 KG/M2

## 2025-05-09 DIAGNOSIS — Z86.0100 HX OF COLONIC POLYPS: ICD-10-CM

## 2025-05-09 DIAGNOSIS — R63.0 APPETITE LOSS: ICD-10-CM

## 2025-05-09 DIAGNOSIS — R63.4 UNINTENTIONAL WEIGHT LOSS: ICD-10-CM

## 2025-05-09 DIAGNOSIS — Z86.0100 PERSONAL HISTORY OF COLON POLYPS, UNSPECIFIED: ICD-10-CM

## 2025-05-09 DIAGNOSIS — Z12.11 COLON CANCER SCREENING: Primary | ICD-10-CM

## 2025-05-09 DIAGNOSIS — Z12.11 SCREENING FOR COLON CANCER: Primary | ICD-10-CM

## 2025-05-09 LAB
ABSOLUTE BASOPHILS: 43 CELLS/UL (ref 0–200)
ABSOLUTE EOSINOPHILS: 49 CELLS/UL (ref 15–500)
ABSOLUTE LYMPHOCYTES: 1885 CELLS/UL (ref 850–3900)
ABSOLUTE MONOCYTES: 299 CELLS/UL (ref 200–950)
ABSOLUTE NEUTROPHILS: 3825 CELLS/UL (ref 1500–7800)
ALBUMIN/GLOBULIN RATIO: 1.7 (CALC) (ref 1–2.5)
ALBUMIN: 4.8 G/DL (ref 3.6–5.1)
ALKALINE PHOSPHATASE: 85 U/L (ref 35–144)
ALT: 22 U/L (ref 9–46)
AST: 18 U/L (ref 10–35)
BASOPHILS: 0.7 %
BILIRUBIN, TOTAL: 0.6 MG/DL (ref 0.2–1.2)
BUN/CREATININE RATIO: 26 (CALC) (ref 6–22)
BUN: 17 MG/DL (ref 7–25)
CALCIUM: 9.9 MG/DL (ref 8.6–10.3)
CARBON DIOXIDE: 27 MMOL/L (ref 20–32)
CHLORIDE: 100 MMOL/L (ref 98–110)
CHOL/HDLC RATIO: 2.3 (CALC)
CHOLESTEROL, TOTAL: 114 MG/DL
CREATININE, RANDOM URINE: 148 MG/DL (ref 20–320)
CREATININE: 0.66 MG/DL (ref 0.7–1.35)
EGFR: 103 ML/MIN/1.73M2
EOSINOPHILS: 0.8 %
GLOBULIN: 2.9 G/DL (CALC) (ref 1.9–3.7)
GLUCOSE: 126 MG/DL (ref 65–99)
HDL CHOLESTEROL: 49 MG/DL
HEMATOCRIT: 42.9 % (ref 38.5–50)
HEMOGLOBIN: 14.1 G/DL (ref 13.2–17.1)
LDL-CHOLESTEROL: 46 MG/DL (CALC)
LYMPHOCYTES: 30.9 %
MCH: 32.3 PG (ref 27–33)
MCHC: 32.9 G/DL (ref 32–36)
MCV: 98.4 FL (ref 80–100)
MICROALBUMIN/CREATININE RATIO, RANDOM URINE: 24 MG/G CREAT
MICROALBUMIN: 3.5 MG/DL
MONOCYTES: 4.9 %
MPV: 12 FL (ref 7.5–12.5)
NEUTROPHILS: 62.7 %
NON-HDL CHOLESTEROL: 65 MG/DL (CALC)
PLATELET COUNT: 188 THOUSAND/UL (ref 140–400)
POTASSIUM: 4.7 MMOL/L (ref 3.5–5.3)
PROTEIN, TOTAL: 7.7 G/DL (ref 6.1–8.1)
PSA, TOTAL: 1.23 NG/ML
RDW: 13.1 % (ref 11–15)
RED BLOOD CELL COUNT: 4.36 MILLION/UL (ref 4.2–5.8)
SODIUM: 138 MMOL/L (ref 135–146)
TRIGLYCERIDES: 106 MG/DL
VITAMIN B12: 738 PG/ML (ref 200–1100)
WHITE BLOOD CELL COUNT: 6.1 THOUSAND/UL (ref 3.8–10.8)

## 2025-05-09 RX ORDER — MAGNESIUM 30 MG
30 TABLET ORAL 2 TIMES DAILY
COMMUNITY

## 2025-05-09 NOTE — PATIENT INSTRUCTIONS
-follow up in 1 month if appetite or weight loss does not improve    -------------------------------------------------------------------------------------------------    1. Schedule colonoscopy with Dr. Silvestre  Diagnosis: CRC screen, hx of colon polyps  Sedation: MAC or IV  Prep: split dose golytely    2. MEDICATION CHANGES PRIOR TO PROCEDURE    *STOP metformin day before & day of procedure    Please read the Bowel Preparation Instruction Sheet carefully, as it contains important information regarding your medications before the procedure. Many medications will need to be temporarily stopped to ensure your safety and the success of the procedure. Commonly stopped medications include:    Diabetic medications (such as: metformin, glimepiride, sitagliptin (januvia), empagliflozin (jardiance), GLP-1 (semaglutide/ozempic/wegovy), insulin)    Weight loss medications (such as: phentermine, semaglutide/wegovy/ozempic, or tirzepatide/zepbound/mounjaro)    Anticoagulants or antiplatelet medications (such as apixaban (Eliquis), warfarin (Coumadin), clopidogrel (Plavix), rivaroxaban (Xarelto))    Certain hypertension medications (such as lisinopril, enalapril, losartan, valsartan, olmesartan)    Additionally, vitamins and supplements should be held for 2 weeks prior to the procedure, as they may interfere with the process.    Failure to correctly stop these medications as instructed can result in cancellation of the procedure for your safety. It is crucial to follow these instructions closely. If you have any questions or concerns regarding your medications, please contact your healthcare provider before the procedure date.    If you are under the care of a cardiologist, a cardiology visit within 90 days of the procedure date is required to obtain cardiac clearance before proceeding.    3.  bowel prep from pharmacy   You can pick the bowel prep up now and store in a cool, dry place in your home until your scheduled bowel  prep start date.    4. Read all bowel prep instructions carefully. Bowel prep instructions can also be found online at:  [email or web address].org/giprep     5. AVOID seeds, nuts, popcorn, raw fruits and vegetables for 5 days before procedure    6. If you start any NEW medication after your visit today, please notify us. Certain medications (like iron or weight loss medications) will need to be held before the procedure, or the procedure cannot be performed safely.

## 2025-05-09 NOTE — TELEPHONE ENCOUNTER
Scheduled for:  Colonoscopy [id number]  Provider Name:   Nirmala  Date:  10/31/2025  Location:  [facility]  Sedation:  MAC  Time:  9:35 am. (pt is aware that ENDO will call the day before the procedure to confirm arrival time)  Prep:  Golytely  Meds/Allergies Reconciled?:  DAMIAN Lay reviewed.  Diagnosis with codes:    CRC Screening Z12.11  Hx of Colon Polyps Z86.010  Was patient informed to call insurance with codes (Y/N):  Yes  Referral sent?:  Referral was sent at the time of electronic surgical scheduling.  [facility] or [facility] notified?:  I sent an electronic request to Endo Scheduling and received a confirmation today.  Medication Orders:  Patient is aware to NOT take iron pills, herbal meds and diet supplements for 2 weeks before exam.   Hold Metformin the day prior and the day of the procedure.  Also to NOT take any form of alcohol, recreational drugs and any forms of ED meds 24-72 hours before exam.   Misc Orders:  N/A   Further instructions given by staff:  I discussed the prep instructions with the patient which she verbally understood. I provided patient with prep instruction's sheet in office.      Patient was informed about the new cancellation policy for his/her procedure. Patient was also given a copy of the cancellation policy at the time of the appointment and verbalized understanding.

## 2025-05-09 NOTE — TELEPHONE ENCOUNTER
SELVIN Prieto -   I saw Mickey today in GI clinic for CRC screening. Colonoscopy will be scheduled.     He informed me since starting escitalopram  on 5/1 he is having insomnia, only sleeping 2 hours a night.  He is feeling fatigued, and low appetite.  He reports he is lost 5 pounds since May 1.  He does report intermittent sweating the past week but denies any pain, fever, body aches, cough, emesis or diarrhea.  Denies suicidal thoughts.  -I advised him to follow up with you in 1 week if symptoms not improving.     DAMIAN Dey

## 2025-05-09 NOTE — H&P
[facility] - Gastroenterology                                                                                                  Clinic History and Physical     Chief Complaint   Patient presents with    Consult     Colonoscopy   Last CLN 4/2019  Hx of Polyps    Weight Loss     Poor appetite.       Requesting physician or provider: Lavon Prieto MD    HPI:   Mickey Ramos is a 69 year old year-old male with active diagnoses including diabetes, high cholesterol, benign prostatic hyperplasia. Prior medical/surgical hx B cell lymphoma, prostate cancer, and other history in note table. The patient presents for colon cancer screening evaluation.    #weight loss  #low appetite  -reports he returned from [place] 4/28, was there for a few months.  While there he had been taking clonazepam from [place] for sleep.  Patient seen by PCP Dr. Prieto on 5/1 and prescribed escitalopram.  Since starting this medication the patient is having insomnia, only sleeping 2 hours a night.  He is feeling fatigued, and low appetite.  He reports he is lost 5 pounds since May 1.  He does report intermittent sweating the past week but denies any pain, fever, body aches, cough, emesis or diarrhea.    -hx of lymphoma & prostate cancer, follows hem/onc yearly Dr. Curry, next appt scheduled in June. Also follows w/ urology  -reports he got Dr. Prieto's labs drawn yesterday at [facility], not resulted yet    #CRC screen  #hx of colon polyps  -prior colonoscopy 2019 with 5 year recall for hyperplastic colon polyps    Patient is here today as a referral from his PCP for evaluation prior to undergoing colonoscopy for CRC screening. Patient denies any GI symptoms of nausea, vomiting, heartburn, dyspepsia, dysphagia, hematemesis, abdominal pain, change in bowel habits, constipation, diarrhea, hematochezia, or melena.     Pt is due for CRC screening. We discussed the available screening options for CRC such as FIT and cologuard. They are electing to pursue  colonoscopy at this time.     Last colonoscopy: 2019 - Dr. Silvestre - 5 year recall  Single 5 mm sessile polyp removed from the mid transverse colon by cold snare polypectomy.  Mild diverticulosis descending and sigmoid colon.  Small internal hemorrhoids.    Transverse colon polyp; biopsy:  Hyperplastic polyp      Last EGD: none     NSAIDS: none   Tobacco: former  Alcohol: occasional  Marijuana: none   Illicit drugs: none     FH GI malignancy: none     No history of adverse reaction to sedation  No ANTONIO  No anticoagulants/antiplatelet  No pacemaker/defibrillator  No pain medications and/or sleep aides    Wt Readings from Last 6 Encounters:   05/09/25 170 lb (77.1 kg)   05/01/25 175 lb (79.4 kg)   06/14/24 182 lb (82.6 kg)   06/13/24 183 lb 12.8 oz (83.4 kg)   06/11/24 186 lb (84.4 kg)   05/28/24 184 lb (83.5 kg)          History, Medications, Allergies, ROS:      Past Medical History[1]   Past Surgical History[2]   Family Hx: Family History[3]   Social History: Short Social Hx on File[4]     Medications (Active prior to today's visit):  Current Medications[5]    Allergies:  Allergies[6]    ROS:   CONSTITUTIONAL: negative for fevers, chills, +sweats & fatigue  EYES Negative for scleral icterus or redness, and diplopia  HEENT: Negative for hoarseness  RESPIRATORY: Negative for cough and severe shortness of breath  CARDIOVASCULAR: Negative for crushing sub-sternal chest pain  GASTROINTESTINAL: See HPI  GENITOURINARY: Negative for dysuria  MUSCULOSKELETAL: Negative for arthralgias and myalgias  SKIN: Negative for jaundice, rash or pruritus  NEUROLOGICAL: Negative for dizziness and headaches  BEHAVIOR/PSYCH: Negative for psychotic behavior. +insomnia      PHYSICAL EXAM:   Blood pressure 131/68, pulse 74, height 5' 11\" (1.803 m), weight 170 lb (77.1 kg).    GEN: Alert, no acute distress, well-nourished   HEENT: anicteric sclera, neck supple, trachea midline, MMM, no palpable or tender neck or supraclavicular lymph nodes  CV:  RRR, the extremities are warm and well perfused   LUNGS: No increased work of breathing, CTAB  ABDOMEN: Soft, symmetrical, non-tender without distention or guarding. No scars or lesions. Aorta is without bruit or visible pulsation. Umbilicus is midline without herniation. Normoactive bowel sounds are present, No masses, hepatomegaly or splenomegaly noted.  MSK: No erythema, no warmth, no swelling of joints  SKIN: No jaundice, no erythema, no rashes, no lesions  HEMATOLOGIC: No bleeding, no bruising  NEURO: Alert and interactive, HUNTER  PSYCH: appropriate mood & affect    Labs/Imaging:     Patient's labs and imaging were reviewed and discussed with patient today.    .  ASSESSMENT/PLAN:   Mickey Ramos is a 69 year old year-old male with active diagnoses including diabetes, high cholesterol, benign prostatic hyperplasia. Prior medical/surgical hx B cell lymphoma, prostate cancer, and other history in note table. The patient presents for colon cancer screening evaluation.    #weight loss  #low appetite  -reports he returned from [place] 4/28, was there for a few months.  While there he had been taking clonazepam from [place] for sleep.  Patient seen by PCP Dr. Prieto on 5/1 and prescribed escitalopram.  Since starting this medication the patient is having insomnia, only sleeping 2 hours a night.  He is feeling fatigued, and low appetite.  He reports he is lost 5 pounds since May 1.  He does report intermittent sweating the past week but denies any pain, fever, body aches, cough, emesis or diarrhea.    -hx of lymphoma & prostate cancer, follows hem/onc yearly Dr. Curry, next appt scheduled in June. Also follows w/ urology  -reports he got Dr. Prieto's labs drawn yesterday at [facility], not resulted yet  -Possibly medication side effect.  Advised patient to follow-up with Dr. Prieto in 1 week if he is not feeling better.  If appetite loss and weight loss continue in 1 month I would like him to follow-up with me    #CRC  screen  #hx of colon polyps  -prior colonoscopy 2019 w/ Dr. Silvestre with 5 year recall for hyperplastic colon polyps    Recommend:    -follow up in 1 month if appetite or weight loss does not improve  -------------------------------------------------------------------------------------------------    1. Schedule colonoscopy with Dr. Silvestre  Diagnosis: CRC screen, hx of colon polyps  Sedation: MAC or IV  Prep: split dose golytely    2. MEDICATION CHANGES PRIOR TO PROCEDURE    *STOP metformin day before & day of procedure    -Anti-platelets and anti-coagulants: N/A     Endoscopy risk/benefit discussion: I have thoroughly discussed the risks, benefits, and alternatives of endoscopic evaluation with the patient, who demonstrated understanding. This includes the potential risks of bleeding, infection, pain, anesthesia complications, and perforation, which may result in prolonged hospitalization or surgical intervention. All of the patient’s questions were addressed to their satisfaction. The patient has chosen to proceed with the endoscopic procedure, including any necessary interventions such as polypectomy, biopsy, control of bleeding.      Orders This Visit:  No orders of the defined types were placed in this encounter.      Meds This Visit:  Requested Prescriptions     Signed Prescriptions Disp Refills    polyethylene glycol, PEG 3350-KCl-NaBcb-NaCl-NaSulf, 236 g Oral Recon Soln 1 each 0     Sig: Take 4,000 mL by mouth As Directed. Take 2,000 mL the night before your procedure and 2,000 mL the morning of your procedure. Take as directed by GI clinic. Okay to substitute for generic.       Imaging & Referrals:  None       DAMIAN Dey    [facility]  5/9/2025      The dictation was partially prepared using Dragon Medical voice recognition software. As a result, errors may occur. When identified, these errors have been corrected. While every attempt is made to correct errors during dictation,  discrepancies may still exist.              [1]   Past Medical History:   Anesthesia complication    headache post lumbar puncture    BPH (benign prostatic hyperplasia)    Diabetes (HCC)    Diabetes mellitus (HCC)    Diffuse large B-cell lymphoma of solid organ excluding spleen    Diverticulosis of large intestine without hemorrhage    Erectile dysfunction due to arterial insufficiency    ESBL (extended spectrum beta-lactamase) producing bacteria infection    High cholesterol    Internal hemorrhoids    Portacath in place    Prostate cancer (HCC)    low volume, Crescencio 3+3, Dx by Dr. Uriostegui. on observation by Urology    Visual impairment    readers    Vitreous floaters   [2]   Past Surgical History:  Procedure Laterality Date    Colonoscopy  2008    [place]    Colonoscopy N/A 04/20/2019    Procedure: COLONOSCOPY;  Surgeon: Harjinder Silvestre MD;  Location: [facility] ENDOSCOPY    Cysto insertion transprostatic implant single Bilateral 06/14/2024    UroLift; 5 implants.    Inguinal hernia repair      Port, indwelling, imp      Removal testis,simple Right    [3]   Family History  Problem Relation Age of Onset    Heart Disease Mother     Heart Disease Father     Breast Cancer Sister 40    Diabetes Sister     Cancer Sister         Kidney    No Known Problems Sister     No Known Problems Sister     No Known Problems Sister     No Known Problems Brother     No Known Problems Maternal Grandmother     No Known Problems Maternal Grandfather     No Known Problems Paternal Grandmother     No Known Problems Paternal Grandfather     Colon Cancer Neg     Prostate Cancer Neg    [4]   Social History  Socioeconomic History    Marital status:     Number of children: 4   Occupational History    Occupation: works for shipping   Tobacco Use    Smoking status: Former     Current packs/day: 0.00     Average packs/day: 1 pack/day for 47.0 years (47.0 ttl pk-yrs)     Types: Cigarettes     Start date: 1971     Quit date: 2018     Years  since quittin.3    Smokeless tobacco: Never   Vaping Use    Vaping status: Never Used   Substance and Sexual Activity    Alcohol use: Yes     Comment: occasionally 1-2 drinks on weekends    Drug use: No    Sexual activity: Not Currently     Partners: Female   Other Topics Concern    Caffeine Concern Yes     Comment: Coffee    Exercise No   Social History Narrative    Relationships:  - Roz    Children: Marcia (with Roz). 3 other kids - Alexandria, Sugey, Memo.     Pets: None    School: N/A    Work: Retired - used to do  for metal stamping. Now occasionally in [place] - working in [place].     Origin: From [place], came to [place] in     Interests: Enjoys exercising     Spiritual: Not Orthodoxy, not spiritual       [5]   Current Outpatient Medications   Medication Sig Dispense Refill    magnesium 30 MG Oral Tab Take 1 tablet (30 mg total) by mouth 2 (two) times daily.      polyethylene glycol, PEG 3350-KCl-NaBcb-NaCl-NaSulf, 236 g Oral Recon Soln Take 4,000 mL by mouth As Directed. Take 2,000 mL the night before your procedure and 2,000 mL the morning of your procedure. Take as directed by GI clinic. Okay to substitute for generic. 1 each 0    escitalopram 5 MG Oral Tab Take 1 tablet (5 mg total) by mouth daily. 90 tablet 3    metFORMIN HCl 1000 MG Oral Tab Take 1 tablet (1,000 mg total) by mouth 2 (two) times daily with meals. 180 tablet 1    atorvastatin 10 MG Oral Tab Take 1 tablet (10 mg total) by mouth nightly. 90 tablet 3    Blood Glucose Monitoring Suppl (ACCU-CHEK GUIDE ME) w/Device Does not apply Kit 1 each daily. Check blood sugars once as prescribed. 1 kit 0    Glucose Blood (ACCU-CHEK GUIDE TEST) In Vitro Strip Please check blood sugars daily 100 each 3    Accu-Chek Softclix Lancets Does not apply Misc 1 Lancet by Finger stick route daily. Use as directed. 100 each 3   [6] No Known Allergies

## 2025-05-10 NOTE — TELEPHONE ENCOUNTER
Kat Huertas, thanks for the update. He's had ongoing insomnia prior to the escitalopram and many of his symptoms seem to be partly related to anxiety. Thanks for reinforcing close follow up.    Lavon

## 2025-05-15 ENCOUNTER — TELEPHONE (OUTPATIENT)
Age: 70
End: 2025-05-15

## 2025-05-15 NOTE — TELEPHONE ENCOUNTER
El equipo de [facility] ha intentado comunicarse con ekaterina para chaitanya seguimiento a un pedido realizado en el consultorio del Dr. Prieto. Por favor, llámenos al [phone] para hablar sobre la coordinación de la atención y los recursos.    Irina mensaje ha sido traducido con [employer].

## 2025-05-28 ENCOUNTER — OFFICE VISIT (OUTPATIENT)
Dept: SURGERY | Facility: CLINIC | Age: 70
End: 2025-05-28

## 2025-05-28 DIAGNOSIS — N13.8 BPH WITH OBSTRUCTION/LOWER URINARY TRACT SYMPTOMS: ICD-10-CM

## 2025-05-28 DIAGNOSIS — N52.9 ERECTILE DYSFUNCTION, UNSPECIFIED ERECTILE DYSFUNCTION TYPE: ICD-10-CM

## 2025-05-28 DIAGNOSIS — N40.1 BPH WITH OBSTRUCTION/LOWER URINARY TRACT SYMPTOMS: ICD-10-CM

## 2025-05-28 DIAGNOSIS — C61 PROSTATE CANCER (HCC): Primary | ICD-10-CM

## 2025-05-28 PROCEDURE — 99214 OFFICE O/P EST MOD 30 MIN: CPT | Performed by: UROLOGY

## 2025-05-28 PROCEDURE — 1159F MED LIST DOCD IN RCRD: CPT | Performed by: UROLOGY

## 2025-05-28 NOTE — PROGRESS NOTES
[facility] Urology  Follow-Up Visit    HPI: Mickey Ramos is a 69 year old male presents for a follow up visit. Patient was last seen on 6/5/2024.    INTERVAL HISTORY: Following up regarding low risk prostate cancer on active surveillance, BPH with LUTS, ED.    Former [facility] patient.    PSA level 5/2025 stable at 1.23 ng/mL.    Prostate MRI 8/2023 revealing a 37 cc gland, PI-RADS 2 study without any suspicious dominant lesions.    Previously on tamsulosin 0.4 mg daily for BPH with LUTS.  He underwent UroLift on 6/14/2024 (5 implants deployed).     Since then, he stopped medical therapy for BPH.  His IPSS is 13 today (down from 25 preop) with a QOL score of 2.    He reports good stream, less frequency and urgency.  No sensation regular bladder emptying.    Bladder scan shows a PVR of 0 mL.    Erectile dysfunction, on tadalafil as needed with acceptable results.  No significant side effects.      1. Low risk prostate cancer  2. BPH with LUTS  3. ED  Initially diagnosed in 2013 with low volume Dahlgren 6 prostate cancer by [facility].    Has been on active surveillance.    Follow-up surveillance prostate biopsy 1/2019 by Dr. PAREKH revealing grade group 1 prostate cancer in 2/14 cores up to 15% involvement.    Prostate MRI 8/2021 assigned PI-RADS 2 without any definite suspicious lesions of the prostate.  Prostate measured 42 cc.    His PSA levels have been stable.    PSA 5/3/2023 was 2.93 ng/mL.    Also with erectile dysfunction, usually responsive to tadalafil as needed.    History of right inguinal orchiectomy, pathology revealing lymphoma.    BPH with LUTS, on tamsulosin 0.4 mg daily.  His IPSS is 12 (1/1/2/1/2/2/3) with a quality-of-life score of 3.  He awakens about 3 times at night to urinate.    Bladder scan PVR 2 mL.    - 5/2024 F/U:  - PSA stable.  MRI 8/2023 PI-RADS 2.  DOLLY without nodules.  Continue active surveillance.    - On Flomax 0.4 mg daily for BPH.  Worsening LUTS.  IPSS 25 (4/4/3/4/3/3/4) with a QoL score  of 5.  PVR 6 mL.  Interested in MIST's.    - On tadalafil as needed for ED with acceptable results.    - 6/5/24 F/U:   - PSA stable.  MRI 8/2023 PI-RADS 2.  Continue active surveillance.    - Uroflow + PVR: Voided 360 mL, Qmax 15.3 mL/s, Qave 7.3 mL/s, flow time 49.5 sec, voiding time 57.1 sec, voiding curve shape prolonged and flattened with low peak,  mL.      - Prostate volume on TRUS: 37.5 cc.    - Cystoscopy revealing mild to moderate prostate enlargement with kissing lateral lobes and evidence of bladder outlet obstruction.  No significant median lobe enlargement.      Reviewed past medical, surgical, family, and social history.  Reviewed med list and allergies.      REVIEW OF SYSTEMS:  Pertinent positives and negatives per HPI. A 10-point ROS was performed and is otherwise negative.       EXAM:  There were no vitals taken for this visit.    Physical Exam  Constitutional:       General: He is not in acute distress.     Appearance: He is well-developed.   HENT:      Head: Normocephalic.   Eyes:      General: No scleral icterus.  Cardiovascular:      Rate and Rhythm: Normal rate.   Pulmonary:      Effort: Pulmonary effort is normal.   Abdominal:      General: There is no distension.      Palpations: Abdomen is soft.      Tenderness: There is no abdominal tenderness.   Genitourinary:     Comments: DOLLY revealing a 2+ enlarged prostate, smooth, nontender, nonnodular and symmetrical.  Skin:     General: Skin is warm and dry.   Neurological:      Mental Status: He is alert and oriented to person, place, and time.   Psychiatric:         Mood and Affect: Mood normal.         Behavior: Behavior normal.       PATHOLOGY:  See HPI for details.      LABS:     PSA   Latest Ref Rng <=4.00 ng/mL   8/16/2016 5.6 (H)    6/24/2017 3.0    7/28/2018 4.2 (H)    10/27/2018 3.9    8/24/2019 4.24 (H)    11/10/2019 4.50 (H)    4/21/2020 4.09 (H)    9/11/2020 3.89    8/2/2021 3.22    5/3/2023 2.93    5/28/2024 1.40   5/8/2025 1.23         IMAGING:    MP MRI prostate (8/2023):   1. Multiple BPH nodules within the transitional zones bilaterally.   2. No peripheral zone lesions identified.   3. PI-RADS category 2 study.       UROLOGY PROCEDURE:  Not performed today.      IMPRESSION:  69 year old  male with low risk prostate cancer diagnosed in 2015, on active surveillance.  Erectile dysfunction and BPH with LUTS.    Regarding his prostate cancer, PSA levels remain stable.  DOLLY unremarkable.  Given his very low risk prostate cancer, active surveillance remains an option.  Components of active surveillance were discussed.  Definitive local treatment options were discussed including radiation therapy and surgical treatment options.    Patient agrees to continued active surveillance.    Regarding his ED: Responsive to 5 PDE inhibitors as needed.  He may continue current management.    Regarding his BPH with LUTS: Status post UroLift June 2024.  Improved LUTS.  Medical therapy discontinued.  Continues to do well.  Discussed continuing current management and monitoring his voiding symptoms.  He is agreeable.    All questions answered.      PLAN:  1.  Continue active surveillance of low risk prostate cancer.  Follow-up in 6 months with a PSA.    2.  Continue tadalafil as needed for management of erectile dysfunction.  Prescribed by PCP.    3.  Monitor voiding symptoms after UroLift 6/2024.  Now off medical therapy.    Follow-up in 6 months with a PSA.      Oswaldo Perry MD  5/28/2025

## 2025-05-30 ENCOUNTER — HOSPITAL ENCOUNTER (OUTPATIENT)
Dept: CV DIAGNOSTICS | Facility: HOSPITAL | Age: 70
Discharge: HOME | End: 2025-05-30
Attending: FAMILY MEDICINE
Payer: MEDICARE

## 2025-05-30 DIAGNOSIS — R01.1 HEART MURMUR: ICD-10-CM

## 2025-05-30 PROCEDURE — 76376 3D RENDER W/INTRP POSTPROCES: CPT | Performed by: FAMILY MEDICINE

## 2025-05-30 PROCEDURE — 93306 TTE W/DOPPLER COMPLETE: CPT | Performed by: FAMILY MEDICINE

## 2025-06-04 ENCOUNTER — PATIENT MESSAGE (OUTPATIENT)
Dept: INTERNAL MEDICINE CLINIC | Facility: CLINIC | Age: 70
End: 2025-06-04

## 2025-06-04 NOTE — TELEPHONE ENCOUNTER
Patient walked in requesting a call back from Dr. Prieto to discuss ECHO test results.    Patient scheduled  appointment on 6/18/25 for sleep medication follow up.     Please call to advise.

## 2025-06-08 DIAGNOSIS — F41.9 ANXIETY: ICD-10-CM

## 2025-06-09 RX ORDER — CLONAZEPAM 0.5 MG/1
0.5 TABLET ORAL NIGHTLY PRN
Qty: 15 TABLET | Refills: 0 | Status: SHIPPED | OUTPATIENT
Start: 2025-06-09

## 2025-06-09 NOTE — TELEPHONE ENCOUNTER
Please review. Protocol Failed; No Protocol      Recent fills: 5/12/2025  Last Rx written: 5/12/2025  Last office visit: 5/1/2025    Future Appointments  Date Type Provider Dept   06/11/25 Appointment Lavon Prieto MD [facility]   Showing future appointments within next 150 days with a meds authorizing provider and meeting all other requirements

## 2025-06-10 ENCOUNTER — TELEPHONE (OUTPATIENT)
Age: 70
End: 2025-06-10

## 2025-06-10 NOTE — TELEPHONE ENCOUNTER
Domenico,    El equipo de [facility] ha intentado comunicarse con usted en relación con un pedido del consultorio del Dr. Prieto. Nos comunicamos con usted para ayudarlo a coordinar la atención y los recursos que puedan satisfacer ruba necesidades. Llame a nuestro consultorio al [phone]. Para recibir asistencia más inmediata, puede comunicarse con nuestra línea de ayuda disponible las 24 horas al [phone]. Esperamos tener noticias suyas pronto.

## 2025-06-11 ENCOUNTER — OFFICE VISIT (OUTPATIENT)
Dept: INTERNAL MEDICINE CLINIC | Facility: CLINIC | Age: 70
End: 2025-06-11
Payer: MEDICARE

## 2025-06-11 VITALS
SYSTOLIC BLOOD PRESSURE: 128 MMHG | OXYGEN SATURATION: 99 % | BODY MASS INDEX: 24.08 KG/M2 | TEMPERATURE: 98 F | WEIGHT: 172 LBS | DIASTOLIC BLOOD PRESSURE: 60 MMHG | HEIGHT: 71 IN | HEART RATE: 60 BPM

## 2025-06-11 DIAGNOSIS — E11.65 TYPE 2 DIABETES MELLITUS WITH HYPERGLYCEMIA, WITHOUT LONG-TERM CURRENT USE OF INSULIN (HCC): ICD-10-CM

## 2025-06-11 DIAGNOSIS — F41.9 ANXIETY: ICD-10-CM

## 2025-06-11 DIAGNOSIS — G47.00 INSOMNIA, UNSPECIFIED TYPE: Primary | ICD-10-CM

## 2025-06-11 DIAGNOSIS — Z23 ENCOUNTER FOR IMMUNIZATION: ICD-10-CM

## 2025-06-11 DIAGNOSIS — H69.93 DYSFUNCTION OF BOTH EUSTACHIAN TUBES: ICD-10-CM

## 2025-06-11 PROCEDURE — 90746 HEPB VACCINE 3 DOSE ADULT IM: CPT | Performed by: FAMILY MEDICINE

## 2025-06-11 PROCEDURE — 3074F SYST BP LT 130 MM HG: CPT | Performed by: FAMILY MEDICINE

## 2025-06-11 PROCEDURE — 3008F BODY MASS INDEX DOCD: CPT | Performed by: FAMILY MEDICINE

## 2025-06-11 PROCEDURE — 3061F NEG MICROALBUMINURIA REV: CPT | Performed by: FAMILY MEDICINE

## 2025-06-11 PROCEDURE — G2211 COMPLEX E/M VISIT ADD ON: HCPCS | Performed by: FAMILY MEDICINE

## 2025-06-11 PROCEDURE — G0010 ADMIN HEPATITIS B VACCINE: HCPCS | Performed by: FAMILY MEDICINE

## 2025-06-11 PROCEDURE — 3044F HG A1C LEVEL LT 7.0%: CPT | Performed by: FAMILY MEDICINE

## 2025-06-11 PROCEDURE — 1159F MED LIST DOCD IN RCRD: CPT | Performed by: FAMILY MEDICINE

## 2025-06-11 PROCEDURE — 99214 OFFICE O/P EST MOD 30 MIN: CPT | Performed by: FAMILY MEDICINE

## 2025-06-11 PROCEDURE — 1160F RVW MEDS BY RX/DR IN RCRD: CPT | Performed by: FAMILY MEDICINE

## 2025-06-11 PROCEDURE — 3078F DIAST BP <80 MM HG: CPT | Performed by: FAMILY MEDICINE

## 2025-06-11 RX ORDER — TRAZODONE HYDROCHLORIDE 50 MG/1
50 TABLET ORAL NIGHTLY
Qty: 90 TABLET | Refills: 3 | Status: SHIPPED | OUTPATIENT
Start: 2025-06-11

## 2025-06-11 NOTE — ASSESSMENT & PLAN NOTE
Patient with diabetes.  Diabetes is now controlled.   Continue metformin 1000 mg twice daily  Continue atorvastatin 10 mg nightly.  Ophthalmology - need to acquire records  Follow-up in 5 months.

## 2025-06-11 NOTE — PROGRESS NOTES
FAMILY MEDICINE CLINIC NOTE    HPI  Mickey Ramos is a 69 year old male presenting for        #Insomnia  #Anxiety  -marriage stress - left him  -denies depression  -no SI/HI  -does not want to see a therapist   -denies other manic symptoms   -melatonin - not helpful  -started on escitalopram 5 mg daily - does not feel like it's helping at all, prefers to stop  -reports stress and anxiety is not the most bothersome thing for him   -just wants medication for sleep  -clonazepam as needed - he is using this sparingly which helps      #DM  -Hba1c: 11.3 5/29/24 , 6.7 5/2025  -Microalbuminuria:5/2025  -B12: 5/2025 normal  -Pneumonia vaccine: Prevnar 23 8/2019, Prevnar 20 5/28/24  -HepB: Indicated vaccine  -Eye exam: No diabetic retinopathy bilaterally - Dr Ronal Vigil 6/27/24. He states he went recently.  -Diabetic foot exam: 5/1/25 Bilateral barefoot skin diabetic exam is normal, visualized feet and the appearance is normal. Bilateral monofilament/sensation of both feet is normal. Pulsation pedal pulse exam of both lower legs/feet is normal as well.  -Current medications: Metformin 1000 mg twice daily.  -Statin: Started on atorvastatin 10 mg nightly   -Blood sugars: in the 110-20s  -AM:     -Noon:   -PM:      -hypoglycemia: None    #Ear  -bilateral ears feel full, like he has to pop them  -ongoing for several months    ---other        #History of large B-Cell lymphoma  #Testicular lymphoma   -hematology Dr Curry     #Cataracts  -ophthalmology Dr Vigil     #Prostate cancer  #BPH  #ED   -urology Dr Perry  -cystoscopy, urolift  6/2024  -reports improvement with urination           ROS  GENERAL: No fever/chills, no recent weight loss  HEENT: No visual changes, no changes in hearing, no sore throats  NECK: No pain, no swelling  RESP: No cough, no SOB  CV: No chest pain, no palpitations  GI: No abd pain, no N/V/D  MSK: No edema  SKIN: No new rashes  NEURO: No numbness, no tingling, no headaches    HEALTH  MAINTENANCE  Health Maintenance Topics with due status: Overdue       Topic Date Due    Zoster Vaccines Never done    Lung Cancer Screening Never done    Colorectal Cancer Screening 2024    COVID-19 Vaccine 2024     Health Maintenance Topics with due status: Due Soon       Topic Date Due    Diabetes Care Dilated Eye Exam 2025       ALLERGIES  Allergies[1]    MEDICATIONS  Current Medications[2]    ACTIVE PROBLEMS  Problem List[3]    PAST MEDICAL HISTORY  Past Medical History[4]    PAST SOCIAL HISTORY  Social History     Socioeconomic History    Marital status:      Spouse name: Not on file    Number of children: 4    Years of education: Not on file    Highest education level: Not on file   Occupational History    Occupation: works for [employer]   Tobacco Use    Smoking status: Former     Current packs/day: 0.00     Average packs/day: 1 pack/day for 47.0 years (47.0 ttl pk-yrs)     Types: Cigarettes     Start date:      Quit date:      Years since quittin.4    Smokeless tobacco: Never   Vaping Use    Vaping status: Never Used   Substance and Sexual Activity    Alcohol use: Yes     Comment: occasionally 1-2 drinks on weekends    Drug use: No    Sexual activity: Not Currently     Partners: Female   Other Topics Concern     Service Not Asked    Blood Transfusions Not Asked    Caffeine Concern Yes     Comment: Coffee    Occupational Exposure Not Asked    Hobby Hazards Not Asked    Sleep Concern Not Asked    Stress Concern Not Asked    Weight Concern Not Asked    Special Diet Not Asked    Back Care Not Asked    Exercise No    Bike Helmet Not Asked    Seat Belt Not Asked    Self-Exams Not Asked   Social History Narrative    Relationships:  - Roz    Children: Marcia (with Roz). 3 other kids - Sugey Ibrahim Robert.     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     Origin: From [place], came to [place] in [place]    Interests: Enjoys exercising     Spiritual: Not Anabaptist, not spiritual         Social Drivers of Health     Food Insecurity: Not on file   Transportation Needs: Not on file   Stress: Not on file   Housing Stability: Not on file       PAST SURGICAL HISTORY  Past Surgical History[5]    PAST FAMILY HISTORY  Family History[6]      PHYSICAL EXAM  Vitals:    06/11/25 1340   BP: 128/60   Pulse: 60   Temp: 97.9 °F (36.6 °C)   SpO2: 99%   Weight: 172 lb (78 kg)   Height: 5' 11\" (1.803 m)      Body mass index is 23.99 kg/m².    GENERAL: NAD  HEENT: TM translucent and non-bulging  NECK: Supple, non-tender  RESP: Non-labored respirations, CTAB, no wheezing, no rales, no rhonchi  CV: RRR, 2/6 systolic murmur  SKIN: Warm and dry, no rashes  NEURO: Answering questions appropriately    LABS  Lab Results   Component Value Date    WBC 6.1 05/08/2025    HGB 14.1 05/08/2025    HCT 42.9 05/08/2025     05/08/2025    NEPERCENT 62.7 05/08/2025    LYPERCENT 30.9 05/08/2025    MOPERCENT 4.9 05/08/2025    EOPERCENT 0.8 05/08/2025    BAPERCENT 0.7 05/08/2025    NE 3,825 05/08/2025    LYMABS 1,885 05/08/2025    MOABSO 299 05/08/2025    EOABSO 49 05/08/2025    BAABSO 43 05/08/2025       Lab Results   Component Value Date     05/08/2025    K 4.7 05/08/2025     05/08/2025    CO2 27 05/08/2025    ANIONGAP 5 06/13/2024    BUN 17 05/08/2025    CREATSERUM 0.66 (L) 05/08/2025    BUNCREA 26 (H) 05/08/2025     (H) 05/08/2025    CA 9.9 05/08/2025    OSMOCALC 290 06/13/2024    GFRNAA 103 03/12/2022    GFRAA 119 03/12/2022    ALT 22 05/08/2025    AST 18 05/08/2025    ALKPHO 85 05/08/2025    BILT 0.6 05/08/2025    TP 7.7 05/08/2025    ALB 4.8 05/08/2025    GLOBULIN 2.9 05/08/2025    ELECTAG 1.7 06/13/2024    FASTING Patient not present 06/13/2024         Lab Results   Component Value Date    CHOLEST 114 05/08/2025    TRIG 106 05/08/2025    HDL 49 05/08/2025    LDL 46  05/08/2025    VLDL 15 03/12/2022    TCHDLRATIO 2.3 05/08/2025    NONHDLC 65 05/08/2025        DIAGNOSTICS      ASSESSMENT/PLAN  Problem List Items Addressed This Visit          HCC Problems    Type 2 diabetes mellitus with hyperglycemia, without long-term current use of insulin (HCC)    Patient with diabetes.  Diabetes is now controlled.   Continue metformin 1000 mg twice daily  Continue atorvastatin 10 mg nightly.  Ophthalmology - need to acquire records  Follow-up in 5 months.            Mental Health    Anxiety    Patient reports ongoing anxiety and insomnia  Sleep hygiene discussed in the office previously - advised to continue  Melatonin was not helpful  He reports no improvement with escitalopram and prefers not taking the medication - can discontinue   Start trazodone 50 mg nightly.  Clonazepam - to be used sparingly for significant anxiety.   Consider therapy-however this is difficult as he is frequently in other states and countries and he is not interested  Follow up as needed         Relevant Medications    traZODone 50 MG Oral Tab       EarNoseThroat    Dysfunction of both eustachian tubes    Patient reports that he feels like he has to pop his ears intermittently  Symptoms seem most consistent with eustachian tube dysfunction  Advised trial of fluticasone nasal spray.  If without improvement he can notify me and I will refer him to ENT            Sleep    Insomnia - Primary    Patient reports ongoing anxiety and insomnia  Sleep hygiene discussed in the office previously - advised to continue  Melatonin was not helpful  He reports no improvement with escitalopram and prefers not taking the medication - can discontinue   Start trazodone 50 mg nightly.  Clonazepam - to be used sparingly for significant anxiety.   Consider therapy-however this is difficult as he is frequently in other states and countries and he is not interested  Follow up as needed         Relevant Medications    traZODone 50 MG Oral Tab      Other Visit Diagnoses         Encounter for immunization        Relevant Orders    Hep B, Adult [[id number]] (Completed)            Return in about 5 months (around 2025) for follow up.    No topic due editable text     Lavon Prieto MD  Family Medicine           Pre-chartin minutes  Reviewing/obtaining: 10 minutes  Medical Exam:1 minutes  Counseling/education: 5 minutes  Notes: 5 minutes  Referring/communicatin minutes  Care coordination: 0 minutes    My total time spent caring for the patient on the day of the encounter: 23 minutes         [1] No Known Allergies  [2]   Current Outpatient Medications   Medication Sig Dispense Refill    traZODone 50 MG Oral Tab Take 1 tablet (50 mg total) by mouth nightly. 90 tablet 3    clonazePAM 0.5 MG Oral Tab Take 1 tablet (0.5 mg total) by mouth nightly as needed for Anxiety. 15 tablet 0    magnesium 30 MG Oral Tab Take 1 tablet (30 mg total) by mouth 2 (two) times daily.      polyethylene glycol, PEG 3350-KCl-NaBcb-NaCl-NaSulf, 236 g Oral Recon Soln Take 4,000 mL by mouth As Directed. Take 2,000 mL the night before your procedure and 2,000 mL the morning of your procedure. Take as directed by GI clinic. Okay to substitute for generic. 1 each 0    metFORMIN HCl 1000 MG Oral Tab Take 1 tablet (1,000 mg total) by mouth 2 (two) times daily with meals. 180 tablet 1    atorvastatin 10 MG Oral Tab Take 1 tablet (10 mg total) by mouth nightly. 90 tablet 3    Blood Glucose Monitoring Suppl (ACCU-CHEK GUIDE ME) w/Device Does not apply Kit 1 each daily. Check blood sugars once as prescribed. 1 kit 0    Glucose Blood (ACCU-CHEK GUIDE TEST) In Vitro Strip Please check blood sugars daily 100 each 3    Accu-Chek Softclix Lancets Does not apply Misc 1 Lancet by Finger stick route daily. Use as directed. 100 each 3   [3]   Patient Active Problem List  Diagnosis    History of diffuse large B-cell lymphoma    History of prostate cancer    Type 2 diabetes mellitus with  hyperglycemia, without long-term current use of insulin (HCC)    Former smoker    Age-related cataract of both eyes    BPH with obstruction/lower urinary tract symptoms    Health maintenance examination    Heart murmur    Anxiety    Insomnia    Dysfunction of both eustachian tubes   [4]   Past Medical History:   Anesthesia complication    headache post lumbar puncture    BPH (benign prostatic hyperplasia)    Diabetes (HCC)    Diabetes mellitus (HCC)    Diffuse large B-cell lymphoma of solid organ excluding spleen    Diverticulosis of large intestine without hemorrhage    Erectile dysfunction due to arterial insufficiency    ESBL (extended spectrum beta-lactamase) producing bacteria infection    Internal hemorrhoids    Portacath in place    Prostate cancer (HCC)    low volume, [place] 3+3, Dx by Dr. Uriostegui. on observation by Urology    Visual impairment    readers    Vitreous floaters   [5]   Past Surgical History:  Procedure Laterality Date    Colonoscopy  2008    [place]    Colonoscopy N/A 04/20/2019    Procedure: COLONOSCOPY;  Surgeon: Harjinder Silvestre MD;  Location: [facility] ENDOSCOPY    Cysto insertion transprostatic implant single Bilateral 06/14/2024    UroLift; 5 implants.    Inguinal hernia repair      Port, indwelling, imp      Removal testis,simple Right    [6]   Family History  Problem Relation Age of Onset    Heart Disease Mother     Heart Disease Father     Breast Cancer Sister 40    Diabetes Sister     Cancer Sister         Kidney    No Known Problems Sister     No Known Problems Sister     No Known Problems Sister     No Known Problems Brother     No Known Problems Maternal Grandmother     No Known Problems Maternal Grandfather     No Known Problems Paternal Grandmother     No Known Problems Paternal Grandfather     Colon Cancer Neg     Prostate Cancer Neg

## 2025-06-11 NOTE — ASSESSMENT & PLAN NOTE
Patient reports that he feels like he has to pop his ears intermittently  Symptoms seem most consistent with eustachian tube dysfunction  Advised trial of fluticasone nasal spray.  If without improvement he can notify me and I will refer him to ENT

## 2025-06-11 NOTE — ASSESSMENT & PLAN NOTE
Patient reports ongoing anxiety and insomnia  Sleep hygiene discussed in the office previously - advised to continue  Melatonin was not helpful  He reports no improvement with escitalopram and prefers not taking the medication - can discontinue   Start trazodone 50 mg nightly.  Clonazepam - to be used sparingly for significant anxiety.   Consider therapy-however this is difficult as he is frequently in other states and countries and he is not interested  Follow up as needed

## 2025-06-11 NOTE — PATIENT INSTRUCTIONS
PATIENT INSTRUCTIONS    Thank you for seeing me today, it was a pleasure taking care of you.  Please check out at the  and schedule a follow up appointment.  Return in about 5 months (around 11/11/2025) for follow up.  Please remember that the preferred charlene period for appointments is 5 minutes. This is to help maximize the amount of time that we can spend together at our visits.    Continue current medications  Use clonazepam sparingly  Start trazodone 50 mg nightly   Try fluticasone (flonase) nasal spray   If without improvement I can send you to a ENT specialist    Tk,  Dr. Prieto

## 2025-06-13 DIAGNOSIS — Z85.79 ENCOUNTER FOR FOLLOW-UP SURVEILLANCE OF DIFFUSE LARGE B-CELL LYMPHOMA: ICD-10-CM

## 2025-06-13 DIAGNOSIS — Z08 ENCOUNTER FOR FOLLOW-UP SURVEILLANCE OF DIFFUSE LARGE B-CELL LYMPHOMA: ICD-10-CM

## 2025-06-13 DIAGNOSIS — Z85.72 HISTORY OF DIFFUSE LARGE B-CELL LYMPHOMA: ICD-10-CM

## 2025-06-14 ENCOUNTER — HOSPITAL ENCOUNTER (OUTPATIENT)
Dept: ULTRASOUND IMAGING | Facility: HOSPITAL | Age: 70
Discharge: HOME | End: 2025-06-14
Attending: FAMILY MEDICINE
Payer: MEDICARE

## 2025-06-14 DIAGNOSIS — Z00.00 HEALTH MAINTENANCE EXAMINATION: ICD-10-CM

## 2025-06-14 DIAGNOSIS — Z87.891 FORMER SMOKER: ICD-10-CM

## 2025-06-14 PROCEDURE — 76706 US ABDL AORTA SCREEN AAA: CPT | Performed by: FAMILY MEDICINE

## 2025-06-16 ENCOUNTER — NURSE ONLY (OUTPATIENT)
Age: 70
End: 2025-06-16
Attending: INTERNAL MEDICINE
Payer: MEDICARE

## 2025-06-16 ENCOUNTER — OFFICE VISIT (OUTPATIENT)
Age: 70
End: 2025-06-16
Attending: INTERNAL MEDICINE
Payer: MEDICARE

## 2025-06-16 VITALS
DIASTOLIC BLOOD PRESSURE: 82 MMHG | TEMPERATURE: 96 F | BODY MASS INDEX: 24.91 KG/M2 | HEIGHT: 70 IN | WEIGHT: 174 LBS | SYSTOLIC BLOOD PRESSURE: 158 MMHG | RESPIRATION RATE: 16 BRPM | HEART RATE: 58 BPM | OXYGEN SATURATION: 98 %

## 2025-06-16 DIAGNOSIS — Z85.79 ENCOUNTER FOR FOLLOW-UP SURVEILLANCE OF DIFFUSE LARGE B-CELL LYMPHOMA: ICD-10-CM

## 2025-06-16 DIAGNOSIS — Z85.72 HISTORY OF DIFFUSE LARGE B-CELL LYMPHOMA: Primary | ICD-10-CM

## 2025-06-16 DIAGNOSIS — Z08 ENCOUNTER FOR FOLLOW-UP SURVEILLANCE OF DIFFUSE LARGE B-CELL LYMPHOMA: ICD-10-CM

## 2025-06-16 DIAGNOSIS — Z85.72 HISTORY OF DIFFUSE LARGE B-CELL LYMPHOMA: ICD-10-CM

## 2025-06-16 LAB
ALBUMIN SERPL-MCNC: 4.7 G/DL (ref 3.2–4.8)
ALBUMIN/GLOB SERPL: 2.1 {RATIO} (ref 1–2)
ALP LIVER SERPL-CCNC: 57 U/L (ref 45–117)
ALT SERPL-CCNC: 16 U/L (ref 10–49)
ANION GAP SERPL CALC-SCNC: 6 MMOL/L (ref 0–18)
AST SERPL-CCNC: 13 U/L (ref ?–34)
BASOPHILS # BLD AUTO: 0.06 X10(3) UL (ref 0–0.2)
BASOPHILS NFR BLD AUTO: 1 %
BILIRUB SERPL-MCNC: 0.7 MG/DL (ref 0.2–1.1)
BUN BLD-MCNC: 13 MG/DL (ref 9–23)
BUN/CREAT SERPL: 18.8 (ref 10–20)
CALCIUM BLD-MCNC: 9.5 MG/DL (ref 8.7–10.4)
CHLORIDE SERPL-SCNC: 104 MMOL/L (ref 98–112)
CO2 SERPL-SCNC: 30 MMOL/L (ref 21–32)
CREAT BLD-MCNC: 0.69 MG/DL (ref 0.7–1.3)
DEPRECATED RDW RBC AUTO: 46.5 FL (ref 35.1–46.3)
EGFRCR SERPLBLD CKD-EPI 2021: 101 ML/MIN/1.73M2 (ref 60–?)
EOSINOPHIL # BLD AUTO: 0.16 X10(3) UL (ref 0–0.7)
EOSINOPHIL NFR BLD AUTO: 2.8 %
ERYTHROCYTE [DISTWIDTH] IN BLOOD BY AUTOMATED COUNT: 13 % (ref 11–15)
GLOBULIN PLAS-MCNC: 2.2 G/DL (ref 2–3.5)
GLUCOSE BLD-MCNC: 132 MG/DL (ref 70–99)
HCT VFR BLD AUTO: 38.7 % (ref 39–53)
HGB BLD-MCNC: 12.9 G/DL (ref 13–17.5)
IMM GRANULOCYTES # BLD AUTO: 0.01 X10(3) UL (ref 0–1)
IMM GRANULOCYTES NFR BLD: 0.2 %
LDH SERPL L TO P-CCNC: 140 U/L (ref 120–246)
LYMPHOCYTES # BLD AUTO: 2.72 X10(3) UL (ref 1–4)
LYMPHOCYTES NFR BLD AUTO: 47.6 %
MCH RBC QN AUTO: 32.7 PG (ref 26–34)
MCHC RBC AUTO-ENTMCNC: 33.3 G/DL (ref 31–37)
MCV RBC AUTO: 98 FL (ref 80–100)
MONOCYTES # BLD AUTO: 0.33 X10(3) UL (ref 0.1–1)
MONOCYTES NFR BLD AUTO: 5.8 %
NEUTROPHILS # BLD AUTO: 2.44 X10 (3) UL (ref 1.5–7.7)
NEUTROPHILS # BLD AUTO: 2.44 X10(3) UL (ref 1.5–7.7)
NEUTROPHILS NFR BLD AUTO: 42.6 %
OSMOLALITY SERPL CALC.SUM OF ELEC: 292 MOSM/KG (ref 275–295)
PLATELET # BLD AUTO: 187 10(3)UL (ref 150–450)
POTASSIUM SERPL-SCNC: 4.4 MMOL/L (ref 3.5–5.1)
PROT SERPL-MCNC: 6.9 G/DL (ref 5.7–8.2)
RBC # BLD AUTO: 3.95 X10(6)UL (ref 3.8–5.8)
SODIUM SERPL-SCNC: 140 MMOL/L (ref 136–145)
WBC # BLD AUTO: 5.7 X10(3) UL (ref 4–11)

## 2025-06-16 NOTE — PROGRESS NOTES
HPI     Mickey Ramos is a 69 year old male here for follow up of   Encounter Diagnoses   Name Primary?    History of diffuse large B-cell lymphoma Yes    Encounter for follow-up surveillance of diffuse large B-cell lymphoma         S/p Cycle 6 RCHOP/ RT completed:  The patient was treated to the contralateral testis and scrotum to a dose of 3000 cGy in 150 cGy daily fractions.  This was done utilizing a 16 MEV electron beam with custom shielding.  He began treatment on 04/19/2017 and completed on 05/17/2017 for a total of 28 elapsed days in which he received all 20 prescribed radiation treatments.  Completed 4 IT MTX prophylactic.      Pt here today for follow up.     States he must have been sick in [place] as lost weight as, was not hungry and not sleeping well.  States was having a lot of anxiety.  Was taking a medication in [place] for sleep.  He lost 11 lbs since last year per my scale.  He states he feels disoriented at times and forgets things.      States now feeling well.      No fevers. No NS.     No new palpable LNs.      States up to date on vaccines.    ECOG PS  0    Review of Systems:   Review of Systems   Constitutional:  Negative for appetite change, fatigue, fever and unexpected weight change.   Respiratory:  Negative for cough and shortness of breath.    Cardiovascular:  Negative for chest pain.   Gastrointestinal:  Negative for abdominal pain.   Musculoskeletal:  Positive for arthralgias (thumbs at times hurt). Negative for back pain and neck pain.        No bone pain   Neurological:  Positive for extremity weakness (states legs feel weak). Negative for dizziness, headaches and numbness.   Hematological:  Negative for adenopathy. Does not bruise/bleed easily.   Psychiatric/Behavioral:  Positive for sleep disturbance.          Meds:  Current Outpatient Medications   Medication Sig Dispense Refill    traZODone 50 MG Oral Tab Take 1 tablet (50 mg total) by mouth nightly. 90 tablet 3    clonazePAM 0.5  MG Oral Tab Take 1 tablet (0.5 mg total) by mouth nightly as needed for Anxiety. 15 tablet 0    magnesium 30 MG Oral Tab Take 1 tablet (30 mg total) by mouth 2 (two) times daily.      polyethylene glycol, PEG 3350-KCl-NaBcb-NaCl-NaSulf, 236 g Oral Recon Soln Take 4,000 mL by mouth As Directed. Take 2,000 mL the night before your procedure and 2,000 mL the morning of your procedure. Take as directed by GI clinic. Okay to substitute for generic. 1 each 0    metFORMIN HCl 1000 MG Oral Tab Take 1 tablet (1,000 mg total) by mouth 2 (two) times daily with meals. 180 tablet 1    atorvastatin 10 MG Oral Tab Take 1 tablet (10 mg total) by mouth nightly. 90 tablet 3    Blood Glucose Monitoring Suppl (ACCU-CHEK GUIDE ME) w/Device Does not apply Kit 1 each daily. Check blood sugars once as prescribed. 1 kit 0    Glucose Blood (ACCU-CHEK GUIDE TEST) In Vitro Strip Please check blood sugars daily 100 each 3    Accu-Chek Softclix Lancets Does not apply Misc 1 Lancet by Finger stick route daily. Use as directed. 100 each 3     Allergies:   No Known Allergies    Past Medical History:    Anesthesia complication    headache post lumbar puncture    BPH (benign prostatic hyperplasia)    Diabetes (HCC)    Diabetes mellitus (HCC)    Diffuse large B-cell lymphoma of solid organ excluding spleen    Diverticulosis of large intestine without hemorrhage    Erectile dysfunction due to arterial insufficiency    ESBL (extended spectrum beta-lactamase) producing bacteria infection    Internal hemorrhoids    Portacath in place    Prostate cancer (HCC)    low volume, [place] 3+3, Dx by Dr. Uriostegui. on observation by Urology    Visual impairment    readers    Vitreous floaters     Past Surgical History:   Procedure Laterality Date    Colonoscopy  2008    [place]    Colonoscopy N/A 04/20/2019    Procedure: COLONOSCOPY;  Surgeon: Harjinder Silvestre MD;  Location: [facility] ENDOSCOPY    Cysto insertion transprostatic implant single Bilateral 06/14/2024     UroLift; 5 implants.    Inguinal hernia repair      Port, indwelling, imp      Removal testis,simple Right      Social History     Socioeconomic History    Marital status:     Number of children: 4   Occupational History    Occupation: works for shipping   Tobacco Use    Smoking status: Former     Current packs/day: 0.00     Average packs/day: 1 pack/day for 47.0 years (47.0 ttl pk-yrs)     Types: Cigarettes     Start date:      Quit date: 2018     Years since quittin.4    Smokeless tobacco: Never   Vaping Use    Vaping status: Never Used   Substance and Sexual Activity    Alcohol use: Yes     Comment: occasionally 1-2 drinks on weekends    Drug use: No    Sexual activity: Not Currently     Partners: Female   Other Topics Concern    Caffeine Concern Yes     Comment: Coffee    Exercise No   Social History Narrative    Relationships:  - Roz    Children: Marcia (with Roz). 3 other kids - Sugey Ibrahim Robert.     Pets: None    School: N/A    Work: Retired - used to do  for metal stamping. Now occasionally in [place] - working in [place] fields.     Origin: From [place], came [place] in     Interests: Enjoys exercising     Spiritual: Not Yarsanism, not spiritual           Family History   Problem Relation Age of Onset    Heart Disease Mother     Heart Disease Father     Breast Cancer Sister 40    Diabetes Sister     Cancer Sister         Kidney    No Known Problems Sister     No Known Problems Sister     No Known Problems Sister     No Known Problems Brother     No Known Problems Maternal Grandmother     No Known Problems Maternal Grandfather     No Known Problems Paternal Grandmother     No Known Problems Paternal Grandfather     Colon Cancer Neg     Prostate Cancer Neg          PHYSICAL EXAM:    /82 (BP Location: Left arm, Patient Position: Sitting, Cuff Size: adult)   Pulse 58   Temp (!) 95.6 °F (35.3 °C) (Tympanic)   Resp 16   Ht 1.778 m (5'  10\")   Wt 78.9 kg (174 lb)   SpO2 98%   BMI 24.97 kg/m²   Wt Readings from Last 6 Encounters:   06/16/25 78.9 kg (174 lb)   06/11/25 78 kg (172 lb)   05/09/25 77.1 kg (170 lb)   05/01/25 79.4 kg (175 lb)   06/14/24 82.6 kg (182 lb)   06/13/24 83.4 kg (183 lb 12.8 oz)     Physical Exam   General: Patient is alert and oriented x 3, not in acute distress.  HEENT: EOMs intact. PERRL.  Neck: No JVD. No palpable lymphadenopathy. Neck is supple.  Chest: Clear to auscultation.  Heart: Regular rate and rhythm.   Abdomen: Soft, non tender with good bowel sounds.  Extremities: No edema.  Neurological: Grossly intact.   Lymphatics: There is no palpable lymphadenopathy throughout in the cervical, supraclavicular, axillary, or inguinal regions.  Psych/Depression: normal        ASSESSMENT/PLAN:     Encounter Diagnoses   Name Primary?    History of diffuse large B-cell lymphoma Yes    Encounter for follow-up surveillance of diffuse large B-cell lymphoma        Stage IV DLBCL with testicular involvement.     Discussed with the patient the aggressive nature of the disease, however, goal of treatment is still curative.      Complete standard treatment with RCHOP x 6 cycles followed by L testicular RT, followed by 4 doses of IT MTX for CNS prophylaxis as recommended for testicular lymphoma.    EF 63% and negative stress test.    Tolerated well. S/p RT L testicular RT.  Completed 4 doses of prophylactic IT MTX.      Had persistent HA after IT chemo.  Possible subdural hematoma.  HA resolved.      On surveillance as per NCCN guidelines with CBC, CMP and LDH every 3 months for 2 yrs and CT scan every 6 months for 2 yrs (from October 2017 to October 2019).      Last CT in October of 2019 with GERALDO.      Yearly follow up with labs.     Recommended flu shot and COVID vaccine yearly.    No orders of the defined types were placed in this encounter.  MDM low    Results From Past 48 Hours:  Recent Results (from the past 48 hours)   CBC With  Differential With Platelet    Collection Time: 06/16/25  8:00 AM   Result Value Ref Range    WBC 5.7 4.0 - 11.0 x10(3) uL    RBC 3.95 3.80 - 5.80 x10(6)uL    HGB 12.9 (L) 13.0 - 17.5 g/dL    HCT 38.7 (L) 39.0 - 53.0 %    MCV 98.0 80.0 - 100.0 fL    MCH 32.7 26.0 - 34.0 pg    MCHC 33.3 31.0 - 37.0 g/dL    RDW-SD 46.5 (H) 35.1 - 46.3 fL    RDW 13.0 11.0 - 15.0 %    .0 150.0 - 450.0 10(3)uL    Neutrophil Absolute Prelim 2.44 1.50 - 7.70 x10 (3) uL    Neutrophil Absolute 2.44 1.50 - 7.70 x10(3) uL    Lymphocyte Absolute 2.72 1.00 - 4.00 x10(3) uL    Monocyte Absolute 0.33 0.10 - 1.00 x10(3) uL    Eosinophil Absolute 0.16 0.00 - 0.70 x10(3) uL    Basophil Absolute 0.06 0.00 - 0.20 x10(3) uL    Immature Granulocyte Absolute 0.01 0.00 - 1.00 x10(3) uL    Neutrophil % 42.6 %    Lymphocyte % 47.6 %    Monocyte % 5.8 %    Eosinophil % 2.8 %    Basophil % 1.0 %    Immature Granulocyte % 0.2 %   Comp Metabolic Panel (14)    Collection Time: 06/16/25  8:00 AM   Result Value Ref Range    Glucose 132 (H) 70 - 99 mg/dL    Sodium 140 136 - 145 mmol/L    Potassium 4.4 3.5 - 5.1 mmol/L    Chloride 104 98 - 112 mmol/L    CO2 30.0 21.0 - 32.0 mmol/L    Anion Gap 6 0 - 18 mmol/L    BUN 13 9 - 23 mg/dL    Creatinine 0.69 (L) 0.70 - 1.30 mg/dL    BUN/CREA Ratio 18.8 10.0 - 20.0    Calcium, Total 9.5 8.7 - 10.4 mg/dL    Calculated Osmolality 292 275 - 295 mOsm/kg    eGFR-Cr 101 >=60 mL/min/1.73m2    ALT 16 10 - 49 U/L    AST 13 <34 U/L    Alkaline Phosphatase 57 45 - 117 U/L    Bilirubin, Total 0.7 0.2 - 1.1 mg/dL    Total Protein 6.9 5.7 - 8.2 g/dL    Albumin 4.7 3.2 - 4.8 g/dL    Globulin  2.2 2.0 - 3.5 g/dL    A/G Ratio 2.1 (H) 1.0 - 2.0    Patient Fasting for CMP? Patient not present    LDH    Collection Time: 06/16/25  8:00 AM   Result Value Ref Range     120 - 246 U/L          Imaging & Referrals:  None   No orders of the defined types were placed in this encounter.

## 2025-06-20 ENCOUNTER — HOSPITAL ENCOUNTER (OUTPATIENT)
Dept: CT IMAGING | Facility: HOSPITAL | Age: 70
Discharge: HOME | End: 2025-06-20
Attending: FAMILY MEDICINE
Payer: MEDICARE

## 2025-06-20 DIAGNOSIS — Z00.00 HEALTH MAINTENANCE EXAMINATION: ICD-10-CM

## 2025-06-20 DIAGNOSIS — Z87.891 FORMER SMOKER: ICD-10-CM

## 2025-06-20 PROBLEM — H11.002 PTERYGIUM OF LEFT EYE: Status: ACTIVE | Noted: 2025-06-20

## 2025-06-20 PROCEDURE — 71271 CT THORAX LUNG CANCER SCR C-: CPT | Performed by: FAMILY MEDICINE

## 2025-06-23 PROBLEM — R91.8 LUNG NODULES: Status: ACTIVE | Noted: 2025-06-23

## 2025-08-05 ENCOUNTER — TELEPHONE (OUTPATIENT)
Dept: INTERNAL MEDICINE CLINIC | Facility: CLINIC | Age: 70
End: 2025-08-05

## (undated) DIAGNOSIS — I10 ESSENTIAL HYPERTENSION: Primary | ICD-10-CM

## (undated) DIAGNOSIS — IMO0002 UNCONTROLLED TYPE 2 DIABETES MELLITUS, WITHOUT LONG-TERM CURRENT USE OF INSULIN: ICD-10-CM

## (undated) DIAGNOSIS — E78.1 PURE HYPERGLYCERIDEMIA: ICD-10-CM

## (undated) DEVICE — CAPTIVATOR COLD SNARE

## (undated) DEVICE — STERILE LATEX POWDER-FREE SURGICAL GLOVESWITH NITRILE COATING: Brand: PROTEXIS

## (undated) DEVICE — CYSTO PACK: Brand: MEDLINE INDUSTRIES, INC.

## (undated) DEVICE — Device: Brand: CUSTOM PROCEDURE KIT

## (undated) DEVICE — UROLOGY DRAIN BAG

## (undated) DEVICE — Device: Brand: DEFENDO AIR/WATER/SUCTION AND BIOPSY VALVE

## (undated) DEVICE — GLOVE SUR 7.5 SENSICARE PI PIP CRM PWD F

## (undated) DEVICE — MEDI-VAC NON-CONDUCTIVE SUCTION TUBING 6MM X 1.8M (6FT.) L: Brand: CARDINAL HEALTH

## (undated) DEVICE — LINE MNTR ADLT SET O2 INTMD

## (undated) DEVICE — TRAP 4 CPTR CHMBR N EZ INLN

## (undated) DEVICE — Device

## (undated) DEVICE — 35 ML SYRINGE REGULAR TIP: Brand: MONOJECT

## (undated) NOTE — MR AVS SNAPSHOT
Connor Acosta   1/3/2017 3:30 PM   Office Visit   MRN:  [id number]    Description:  Male : 1955   Provider:  Jam Lyons   Department:  [facility] Hematology Oncology              Visit Summary      Primary Visit Diagnosis Appointment with EM [facility] [address] at [address] [place] [address] ([phone])   [address]   [address] [place], [place] [id number]       Tuesday January 24, 2017 1:00 PM     Appointment with [facility] at [address] [place] [address] [phone]

## (undated) NOTE — IP AVS SNAPSHOT
[address]  602 [facility], [facility] ~ [phone]                Discharge Summary   [facility]21/2017    [facility]           Admission Information        Provider Department    6/21/2017 Nadir Evans MD [facility] In Prochlorperazine Maleate 10 mg tablet   Commonly known as:  COMPAZINE        Take 1 tablet (10 mg total) by mouth every 6 (six) hours as needed for Nausea.     Isak Sommer     [    ]    [    ]    [    ]    [    ]       Zolpidem Tartrate 10 MG Tabs   Com Metabolic Lab Results  (Last result in the past 90 days)    HgbA1C Glucose BUN Creatinine Calcium Alkaline Phosph AST    -- (06/06/17)  144 (H) (06/06/17)  14 (06/06/17)  0.73 (06/06/17)  9.6 (06/06/17)  78 (06/06/17)  30      Metabolic Lab Results  (Last If you have questions, you can call [phone] to talk to our [facility] Staff. Remember, MyChart is NOT to be used for urgent needs.   For medical emergencies, dial 911.             _________________________________________________________________ Most common side effects:  Depends on the specific medication but generally include: diarrhea, constipation, headache, allergic reaction (itching, rash, hives)   What to report to your healthcare team: Pain, nausea/vomiting, no bowel movement in 2+ days, d

## (undated) NOTE — MR AVS SNAPSHOT
Oz Notice   2017 2:00 PM   Office Visit   MRN:  [id number]    Description:  Male : 1955   Provider:  Sita Michael   Department:  [facility] AND [facility] Hematology Oncology              Visit Summary      Primary Visit Diagnosis Wednesday January 25, 2017 8:30 AM     Appointment with BERNARDO LOAIZA INFRN 3 at [address] [place] [address] ([phone])   [address]   [address] [place] [address]       Tuesday February 14, 2017 11:00 AM     Appointment with BERNARDO LOAIZA LAB2 at [place]

## (undated) NOTE — MR AVS SNAPSHOT
Aishwarya Garcia   2017 3:00 PM   Nurse Only   MRN:  [id number]    Description:  Male : 1955   Department:  [address] [place] [address] - [facility]              Visit Summary      Primary Visit Diagnosis     DLBCL (diffuse large B your Zip Code and Date of Birth to complete the sign-up process. If you do not sign up before the expiration date, you must request a new code.     Your unique [employer] Access Code: [email or web address]  Expires: 4/15/2017 12:18 PM    If you have questions, you can c

## (undated) NOTE — LETTER
[facility], [place]  Authorization for Invasive Procedures  1.  I hereby authorize Dr. Saray Choudhray** , my physician and whomever may be designated as the doctor's assistant, to perform the following operation and/or procedure:  *Jacklyn Malcolm 5. I consent to the photographing of the operations or procedures to be performed for the purposes of advancing medicine, science, and/or education, provided my identity is not revealed.  If the procedure has been videotaped, the physician/surgeon will obta __________ Time: ___________    Statement of Physician  My signature below affirms that prior to the time of the procedure, I have explained to the patient and/or his legal representative, the risks and benefits involved in the proposed treatment and any r

## (undated) NOTE — LETTER
2/20/2024    Mickey Ramos        [address]        [facility] [address]            Dear Mickey Ramos,      Our records indicate that you are due for an appointment for a Colonoscopy with Harjinder Silvestre MD. Our doctors are booking out about 3-6 months in advance for procedures.     Please call our office to schedule a phone screening appointment to plan for the procedure(s).   Your medical well-being is important to us.    If your insurance requires a referral, please call your primary care office to request one.      Thank you,      The Physicians and Staff at [facility]

## (undated) NOTE — LETTER
To Whom It May Concern:    Connor Acosta has been under our care regarding a medical issues. Because of this, he has been required to restrict his physical activities.     He may resume his usual activities, including work, on 10/23/2019 with the fol

## (undated) NOTE — MR AVS SNAPSHOT
Adry Spencer   2017 4:00 PM   Office Visit   MRN:  [id number]    Description:  Male : 1955   Provider:  Graham Branch Cera   Department:  [address] [place] [address]              Visit Summary      Prim visit,  view other health information, and more. To sign up or find more information, go to [email or web address] and click on the Sign Up Now link in the [employer] box.      Enter your Arter[employer] Activation Code exactly as it appears below along with yo

## (undated) NOTE — MR AVS SNAPSHOT
After Visit Summary   2/14/2017    Shi Median    MRN: [id number]           Visit Information        Provider Department Dept Phone    2/14/2017  3:00 PM [facility] Chemo Infusion [phone]      Allergies as of 2/14/20 February 15, 2017        . [address] [facility]       Dear Samuel Wilks : Thank you for enrolling in [address] Please follow the instructions below to securely access your online medical record.  [employer] allows y conditions such as allergies, colds, cough, fever, rash, sore throat, headache and pink eye. The cost for a Video Visit is currently $10.         If you receive a survey from [employer], please take a few minutes to complete it and provide feedback.  We s

## (undated) NOTE — LETTER
[address] [place] [address]  Authorization for Invasive Procedures  1.  I hereby authorize Dr. Mary Ellen Gamino** , my physician and whomever may be designated as the doctor's assistant, to perform the following operation and/or procedure:  *Jacobo Butler 5. I consent to the photographing of the operations or procedures to be performed for the purposes of advancing medicine, science, and/or education, provided my identity is not revealed.  If the procedure has been videotaped, the physician/surgeon will obta __________ Time: ___________    Statement of Physician  My signature below affirms that prior to the time of the procedure, I have explained to the patient and/or his legal representative, the risks and benefits involved in the proposed treatment and any r

## (undated) NOTE — MR AVS SNAPSHOT
Kelly High   2017 8:30 AM   Office Visit   MRN:  [id number]    Description:  Male : 1955   Department:  [facility]              Visit Summary      Primary Visit Diagnosis     DLBCL (diffuse large 3. Absorbable undergarments, or any other items contaminated with chemotherapy, should be placed in a sealed plastic bag for disposal, separate from other trash.   4. Toilets should be flushed twice with the lid closed while taking this medication and for 4 Appointment with BERNARDO CC LAB1 at [address] [place] [address] ([phone])   [address]   [address] [facility] [address]       Tuesday January 24, 2017 2:00 PM     Appointment with Nilda Valdez at [facility] AND [facility] Hematology Oncology ([phone]

## (undated) NOTE — MR AVS SNAPSHOT
Aishwarya Garcia   2017 10:30 AM   Office Visit   MRN:  [id number]    Description:  Male : 1955   Provider:  Jonathan Disla   Department:  [facility] AND [facility] Hematology Oncology              Visit Summary      Primary Visit Diagnosis Appointment with EM CC LAB2 at [address] [facility] ([phone])   [address] [facility] [address]            "[employer]"     Sign up for "[employer]", your secure online medical record.   "[employer]" will allow you to access patient in

## (undated) NOTE — MR AVS SNAPSHOT
Maia Flower   6/15/2017 7:30 AM   Office Visit   MRN:  [id number]    Description:  Male : 1955   Department:  [address] [place] [address]              Visit Summary      Primary Visit Diagnosis     DLBCL (diffuse large prepared for your procedure. You will need to park in the [facility] [employer], which is on the [address] [place] [address] side of the hospital. Enter through the [address] [place] [address] entrance, and take the elevator to the 2nd floor. Make a left, and go to the Cath Lab/IR/EP reception desk.  You w Sign up for [employer], your secure online medical record. [employer] will allow you to access patient instructions from your recent visit,  view other health information, and more. To sign up or find more information, go to [email or web address] and cl

## (undated) NOTE — MR AVS SNAPSHOT
Adry Spencer   1/3/2017 2:30 PM   Nurse Only   MRN:  [id number]    Description:  Male : 1955   Department:  [facility] - Infusion              Visit Summary      Primary Visit Diagnosis     DLBCL (diffuse large B Appointment with EM FADIA ALAN 3 at [address] [place] [address] ([phone])   [address]   [facility] [address]            Result Summary for CBC WITH DIFFERENTIAL WITH PLATELET      Narrative     The following orders were created f

## (undated) NOTE — MR AVS SNAPSHOT
Yvonne Patton   2017 3:30 PM   Office Visit   MRN:  [id number]    Description:  Male : 1955   Provider:  Mahesh Guillaume   Department:  [address] [facility]              Visit Summary      [facility] not sign up before the expiration date, you must request a new code. Your unique "[employer] Access Code: [email or web address]  Expires: 6/18/2017  2:04 PM    If you have questions, you can call [phone] to talk to our [facility] Staff.  Remember, [employer]

## (undated) NOTE — MR AVS SNAPSHOT
Reina Ramirez   2017 3:30 PM   Office Visit   MRN:  [id number]    Description:  Male : 1955   Provider:  Ricardo Patel   Department:  [facility] AND [facility] Hematology Oncology              Visit Summary      Primary Visit Diagnosis LAB:  COMP METABOLIC PANEL (14)        Tuesday February 14, 2017 3:00 PM     Appointment with BERNARDO LOAIZA LAB2 at [address] [place] [address] ([phone])   [address]   [facility] [address]       Tuesday February 14, 2017 4:00 PM     Eduardo

## (undated) NOTE — LETTER
[address] [place] [address]  Authorization for Invasive Procedures  1.  I hereby authorize Dr. Sirena Romero / Allison Wolff , my physician and whomever may be designated as the doctor's assistant, to perform the following operation and/or procedure: 5. I consent to the photographing of the operations or procedures to be performed for the purposes of advancing medicine, science, and/or education, provided my identity is not revealed.  If the procedure has been videotaped, the physician/surgeon will obta Witness Signature: ____________________________________________ Date: __________ Time: ___________    Statement of Physician  My signature below affirms that prior to the time of the procedure, I have explained to the patient and/or his legal representativ

## (undated) NOTE — MR AVS SNAPSHOT
Omar Chase   2017 8:30 AM   Office Visit   MRN:  [id number]    Description:  Male : 1955   Department:  [address] [place] [address]              Visit Summary      Primary Visit Diagnosis     DLBCL (diffuse large

## (undated) NOTE — LETTER
[facility], [place]  Authorization for Invasive Procedures  1.  I hereby authorize Dr. Cosmo Lennox** , my physician and whomever may be designated as the doctor's assistant, to perform the following operation and/or procedure:  **L 5. I consent to the photographing of the operations or procedures to be performed for the purposes of advancing medicine, science, and/or education, provided my identity is not revealed.  If the procedure has been videotaped, the physician/surgeon will obta __________ Time: ___________    Statement of Physician  My signature below affirms that prior to the time of the procedure, I have explained to the patient and/or his legal representative, the risks and benefits involved in the proposed treatment and any r

## (undated) NOTE — LETTER
10/15/18        [address] [facility]      [address] [place],    Nuestros registros indican que tiene análisis clínicos pendientes y/o pruebas que se ordenaron en muro nombre que no se marie completado.     C

## (undated) NOTE — LETTER
[facility]  [address]  [facility]  Dept: [phone]    1/22/2019        [facility], [facility] 310 [facility]             Dear Akash Hernandes,    Our rec

## (undated) NOTE — MR AVS SNAPSHOT
Oz Notice   2017 3:30 PM   Nurse Only   MRN:  [id number]    Description:  Male : 1955   Department:  [facility] - Infusion              Visit Summary      Primary Visit Diagnosis     DLBCL (diffuse large B Appointment with Gay Yarbrough; [address] [facility] IVS RM4 IR at [place] [address]. ([phone])   A nurse from the Cath Lab, Interventional Radiology department will be calling you in 1-20 days prior to your procedure to perform a health hi the Cath Lab/IR/EP reception desk. You will then be registered for your procedure. Please bring your insurance cards and photo ID. You will need a responsible adult to drive you home after your procedure.   If you have any questions, please call [phone] calling you in 1-20 days prior to your procedure to perform a health history screening and giving you instructions.  You will receive a call between 3:00 to 6:00pm the afternoon before your procedure with your time of arrival. The time we assign you to arri

## (undated) NOTE — MR AVS SNAPSHOT
Evi Lucas   2017 3:45 PM   Office Visit   MRN:  [id number]    Description:  Male : 1955   Provider:  Artem Azul   Department:  [address] [facility]              Visit Summary      Prim your Zip Code and Date of Birth to complete the sign-up process. If you do not sign up before the expiration date, you must request a new code.     Your unique [employer] Access Code: [email or web address]  Expires: 6/18/2017  2:04 PM    If you have questions, you can c

## (undated) NOTE — IP AVS SNAPSHOT
[address]  602 [facility] [phone]                Discharge Summary   6/14/2017    Millie Barbour           Admission Information        Provider Department    6/14/2017 Pedro Wang MD [facility] In Prochlorperazine Maleate 10 mg tablet   Commonly known as:  COMPAZINE        Take 1 tablet (10 mg total) by mouth every 6 (six) hours as needed for Nausea.     Arun Gramajo     [    ]    [    ]    [    ]    [    ]       Zolpidem Tartrate 10 MG Tabs   Com [facility] Hematology Oncology Follow Up Visit with Radha Rojas MD, Charanjit Donato, RN   [address] [facility])    [address]   [facility] [address]   [phone]            Jun 21, 2017 12:00 PM   INTE Metabolic Lab Results  (Last result in the past 90 days)    ALT Bilirubin,Total Total Protein Albumin Sodium Potassium Chloride    (06/06/17)  39 (06/06/17)  0.6 (06/06/17)  7.1 (06/06/17)  4.4 (06/06/17)  139 (06/06/17)  3.5 (06/06/17)  100      Radiology _____________________________________________________________________________    Medication Side Effects - Medications to be taken at home  As your caregivers, we want you to be aware of the medications you are prescribed to take and their potential SIDE E movement in 2+ days, diarrhea           Mood and Thought Medications     Prochlorperazine Maleate (COMPAZINE) 10 mg tablet       Use: Treat conditions such as depression and thought disorders   Most common side effects: Dizziness, drowsiness, problems with

## (undated) NOTE — MR AVS SNAPSHOT
Oz Notice   2017 4:30 PM   Nurse Only   MRN:  [id number]    Description:  Male : 1955   Department:  [address] [place] [address]              Visit Summary      Primary Visit Diagnosis     DLBCL (diffuse large B Appointment with EM CC LAB2 at [facility] [facility] ([phone])   [address] [facility] [address]            [employer]     Sign up for [employer], your secure online medical record.   [employer] will allow you to access patient in

## (undated) NOTE — MR AVS SNAPSHOT
Ade Justin   3/15/2017 3:00 PM   Office Visit   MRN:  [id number]    Description:  Male : 1955   Provider:  Angela Dick; Pavan Dukes   Department:  [facility]              Visit Summary      Prim Your unique [employer] Access Code: [email or web address]  Expires: 4/15/2017  1:18 PM    If you have questions, you can call [phone] to talk to our [facility] Staff. Remember, [employer] is NOT to be used for urgent needs. For medical emergencies, dial 911.

## (undated) NOTE — LETTER
Uvaldo Chavez, [address]  [address]       10/01/18        Patient: Pretty James   YOB: 1955   Date of Visit: 10/1/2018       Dear  Dr. Bina Jesus MD,      Thank you for referring Janice Nelson (N40.2) Prostate nodule  On DOLLY, prostate 2+ enlarged, right lobe larger than left; soft; slight 1/4+ induration on the right lateral edge.  See prostate cancer above.      (N40.1,  R39.11) Benign prostatic hyperplasia with urinary hesitancy  On DOLLY, prosta 5.  Please keep your appointment to see Dr. Geronimo Coon      6. Camila Bae visit with me in 4-6 weeks and all of the above should be completed before the visit with me      Document electronically generated by:  Nicole Rouse

## (undated) NOTE — MR AVS SNAPSHOT
Connor Acosta   2/15/2017 9:30 AM   Office Visit   MRN:  [id number]    Description:  Male : 1955   Department:  [address] [place] [address]              Visit Summary      Primary Visit Diagnosis     DLBCL (diffuse large information, go to [email or web address] [email or web address]. [email or web address] and click on the Sign Up Now link in the [employer] box. Enter your Amazing [employer] Letters Activation Code exactly as it appears below along with your Zip Code and Date of Birth to complete the sign-up process.  If you do

## (undated) NOTE — MR AVS SNAPSHOT
Geovanna Vargas   2017 3:30 PM   Office Visit   MRN:  [id number]    Description:  Male : 1955   Provider:  Kel Doyle [facility]   Department:  [address] [employer]              Visit Summary      Prim [address] [facility] [place] [address]       Wednesday May 31, 2017 3:30 PM     Appointment with EM FADIA LAB2 at [address] [place] [address] ([phone])   [address] [place] [address]            [facility]     Sign up for MyCCrossTxt,

## (undated) NOTE — Clinical Note
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 Additional written information will be given to me prior to start of therapy.     Additionally, I will receive a copy of this consent fo

## (undated) NOTE — MR AVS SNAPSHOT
Jair Overcast   2017 1:00 PM   Nurse Only   MRN:  [id number]    Description:  Male : 1955   Department:  [facility]              Visit Summary      Primary Visit Diagnosis     DLBCL (diffuse large B Appointment with EM CC INFRN 1 at [address] [place] [address] ([phone])   [address]   [address]

## (undated) NOTE — ED AVS SNAPSHOT
Quique Enriquez   MRN: [id number]    Department:  [facility] Emergency Department   Date of Visit:  1/21/2019           Disclosure     Insurance plans vary and the physician(s) referred by the ER may not be covered by your plan.  Please conta CARE PHYSICIAN AT ONCE OR RETURN IMMEDIATELY TO THE EMERGENCY DEPARTMENT. If you have been prescribed any medication(s), please fill your prescription right away and begin taking the medication(s) as directed.   If you believe that any of the medications

## (undated) NOTE — LETTER
4/28/2019              [address], [address]        [place]         Dear MrBlaise Tien Rodriguez,    Your recent colonoscopy exam at [facility] on 4/20/2019 showed diverticulosis of the colon and a single sma

## (undated) NOTE — MR AVS SNAPSHOT
Maia Flower   2017 4:00 PM   Office Visit   MRN:  [id number]    Description:  Male : 1955   Provider:  Clary Berg   Department:  [facility] AND [facility] Hematology Oncology              Visit Summary      Primary Visit Diagnosis Return in about 3 weeks (around 3/7/2017), or Dr Juanita Sprague, for chemo follow up, with labs, imaging follow up.       To Do List     Wednesday February 15, 2017 9:30 AM     Appointment with BERNARDO ALAN 1 at [address] [place] [address] ([phone])

## (undated) NOTE — MR AVS SNAPSHOT
Lawyer Cordova   3/7/2017 4:00 PM   Office Visit   MRN:  [id number]    Description:  Male : 1955   Provider:  Charissa Gottlieb   Department:  [facility] AND [facility] Hematology Oncology              Visit Summary      Primary Visit Diagnosis Appointment with EM FADIA MOSERN 1 at [address] [place] [address] ([phone])   [address] [address]            [employer]     Sign up for Social[employer]es App, your secure online medical record.   [employer] App will allow you to access patient

## (undated) NOTE — MR AVS SNAPSHOT
Quique Enriquez   2017 5:00 PM   Office Visit   MRN:  [id number]    Description:  Male : 1955   Provider:  Lisa Martin   Department:  [facility] AND [facility] Hematology Oncology              Visit Summary      Primary Visit Diagnosis every 2 hours until you have had no diarrhea for at least 12 hours; during the night take 2 tabs (4mg) every 4 hours        When to call the doctor:  ? Fever -100.5   Stiff neck   ?  Nausea/vomiting not controlled with anti-nausea medications: Unable to dri Fluid intake:  o Drink 8-10 cups of liquid a day and take a water bottle wherever you go. Any fluid is acceptable, but caffeinated products do not count towards your intake and should be limited to 1-2 drinks/day.     Physical Activity  o If your doctor ap exposure to the medication. Handling Body Waste:   1. Caregivers must wear gloves if exposed to the patient’s blood, urine, stool or vomit. Dispose of the used gloves after each use and wash hands with soap and water.   2. Any sheets or clothes soiled Please keep your updated medication list with you to share with other healthcare providers. At [facility] we continue to have an Open Medical Record policy. We can provide you with your current medication list and lab results today.   I Appointment with Quynh Allen; [address] [facility] IVS RM4 IR at [place] [address]. ([phone])   A nurse from the Cath Lab, Interventional Radiology department will be calling you in 1-20 days prior to your procedure to perform a health hi prepared for your procedure. You will need to park in the [facility], which is on the [address] [place] [address] side of the hospital. Enter through the [address] [place] [address] entrance, and take the elevator to the 2nd floor. Make a left, and go to the Cath Lab/IR/EP reception desk.  You w Result Summary for CBC WITH DIFFERENTIAL WITH PLATELET      Narrative     The following orders were created for panel order CBC WITH DIFFERENTIAL WITH PLATELET.   Procedure                               Abnormality         Status                     ------- information, go to [email or web address] and click on the Sign Up Now link in the [employer] box. Enter your [employer] Activation Code exactly as it appears below along with your Zip Code and Date of Birth to complete the sign-up process.  If you do

## (undated) NOTE — IP AVS SNAPSHOT
[address]  [address] [facility] ~ [phone]                Discharge Summary   6/7/2017    Ade Anderson           Admission Information        Provider Department    6/7/2017 Saroj Smiley MD [facility] Inte Prochlorperazine Maleate 10 mg tablet   Commonly known as:  COMPAZINE        Take 1 tablet (10 mg total) by mouth every 6 (six) hours as needed for Nausea.     Elsie Ramon     [    ]    [    ]    [    ]    [    ]       Zolpidem Tartrate 10 MG Tabs   Com Jun 21, 2017 12:00 PM   INTERVENTIONAL SUITES - INTERVENTIONAL RADIOLOGY with Brooklynn Man MD, [address] [facility] IR   [facility] AND [facility] Interventional Suites ([address])    [place] [address] Penni Sandhoff [address] [facility] Pending Labs     Order Current Status    CYTOLOGY FLUIDS In process      Radiology Exams     None         Additional Information       We are concerned for your overall well being:    - If you are a smoker or have smoked in the last 12 months, we encour prescribed to take and their potential SIDE EFFECTS. Your nurse will review your medications with you before you are discharged, and can provide you with additional printed information.  Not all patients will experience these side effects or respond to [facility] Most common side effects: Dizziness, drowsiness, problems with movement   What to report to your healthcare team: Changes in thinking, talking or movement, drowsiness, shaking           Calming and Sleep Medications     Zolpidem Tartrate 10 MG Oral Tab

## (undated) NOTE — LETTER
[address] [place] [address]  Authorization for Invasive Procedures  1.  I hereby authorize Dr. Candi Randhawa** , my physician and whomever may be designated as the doctor's assistant, to perform the following operation and/or procedure:  *Fl 5. I consent to the photographing of the operations or procedures to be performed for the purposes of advancing medicine, science, and/or education, provided my identity is not revealed.  If the procedure has been videotaped, the physician/surgeon will obta __________ Time: ___________    Statement of Physician  My signature below affirms that prior to the time of the procedure, I have explained to the patient and/or his legal representative, the risks and benefits involved in the proposed treatment and any r